# Patient Record
Sex: FEMALE | Race: WHITE | NOT HISPANIC OR LATINO | Employment: OTHER | URBAN - METROPOLITAN AREA
[De-identification: names, ages, dates, MRNs, and addresses within clinical notes are randomized per-mention and may not be internally consistent; named-entity substitution may affect disease eponyms.]

---

## 2023-03-20 LAB
6-ACETYLMORPHINE: <25 NG/ML
7-AMINOCLONAZEPAM: <25 NG/ML
ALPHA-HYDROXYALPRAZOLAM: <25 NG/ML
ALPHA-HYDROXYMIDAZOLAM: <25 NG/ML
ALPRAZOLAM: <25 NG/ML
AMPHETAMINE (PRESENCE) IN URINE BY SCREEN METHOD: ABNORMAL
AMPHETAMINES,URINE: <50 NG/ML
BARBITURATES PRESENCE IN URINE BY SCREEN METHOD: ABNORMAL
CANNABINOIDS IN URINE BY SCREEN METHOD: ABNORMAL
CHLORDIAZEPOXIDE: <25 NG/ML
CLONAZEPAM: <25 NG/ML
COCAINE (PRESENCE) IN URINE BY SCREEN METHOD: ABNORMAL
CODEINE: <50 NG/ML
CREATINE, URINE FOR DRUG: 175.3 MG/DL
DIAZEPAM: <25 NG/ML
DRUG SCREEN COMMENT URINE: ABNORMAL
EDDP: <25 NG/ML
FENTANYL CONFIRMATION, URINE: <2.5 NG/ML
HYDROCODONE: <25 NG/ML
HYDROMORPHONE: <25 NG/ML
LORAZEPAM: <25 NG/ML
MDA,URINE: <200 NG/ML
MDEA,URINE: <200 NG/ML
MDMA,UR: <200 NG/ML
METHADONE CONFIRMATION,URINE: <25 NG/ML
METHAMPHETAMINE QUANTITATIVE URINE: <200 NG/ML
MIDAZOLAM: <25 NG/ML
MORPHINE URINE: <50 NG/ML
NORDIAZEPAM: 50 NG/ML
NORFENTANYL: <2.5 NG/ML
NORHYDROCODONE: <25 NG/ML
NOROXYCODONE: <25 NG/ML
O-DESMETHYLTRAMADOL: <50 NG/ML
OXAZEPAM: 315 NG/ML
OXYCODONE: <25 NG/ML
OXYMORPHONE: <25 NG/ML
PHENCYCLIDINE (PRESENCE) IN URINE BY SCREEN METHOD: ABNORMAL
PHENTERMINE,UR: <200 NG/ML
TEMAZEPAM: 263 NG/ML
TRAMADOL: <50 NG/ML
ZOLPIDEM METABOLITE (ZCA): <25 NG/ML
ZOLPIDEM: <25 NG/ML

## 2023-05-04 LAB
ALANINE AMINOTRANSFERASE (SGPT) (U/L) IN SER/PLAS: 29 U/L (ref 7–45)
ALBUMIN (G/DL) IN SER/PLAS: 4 G/DL (ref 3.4–5)
ALBUMIN (G/DL) IN SER/PLAS: 4.1 G/DL (ref 3.4–5)
ALBUMIN (MG/L) IN URINE: <7 MG/L
ALBUMIN/CREATININE (UG/MG) IN URINE: NORMAL UG/MG CRT (ref 0–30)
ALKALINE PHOSPHATASE (U/L) IN SER/PLAS: 97 U/L (ref 33–136)
ANION GAP IN SER/PLAS: 10 MMOL/L (ref 10–20)
ANION GAP IN SER/PLAS: 10 MMOL/L (ref 10–20)
ASPARTATE AMINOTRANSFERASE (SGOT) (U/L) IN SER/PLAS: 22 U/L (ref 9–39)
BASOPHILS (10*3/UL) IN BLOOD BY AUTOMATED COUNT: 0.05 X10E9/L (ref 0–0.1)
BASOPHILS/100 LEUKOCYTES IN BLOOD BY AUTOMATED COUNT: 0.7 % (ref 0–2)
BILIRUBIN TOTAL (MG/DL) IN SER/PLAS: 0.4 MG/DL (ref 0–1.2)
CALCIUM (MG/DL) IN SER/PLAS: 9.3 MG/DL (ref 8.6–10.3)
CALCIUM (MG/DL) IN SER/PLAS: 9.4 MG/DL (ref 8.6–10.3)
CARBON DIOXIDE, TOTAL (MMOL/L) IN SER/PLAS: 29 MMOL/L (ref 21–32)
CARBON DIOXIDE, TOTAL (MMOL/L) IN SER/PLAS: 30 MMOL/L (ref 21–32)
CHLORIDE (MMOL/L) IN SER/PLAS: 106 MMOL/L (ref 98–107)
CHLORIDE (MMOL/L) IN SER/PLAS: 107 MMOL/L (ref 98–107)
CREATININE (MG/DL) IN SER/PLAS: 1.07 MG/DL (ref 0.5–1.05)
CREATININE (MG/DL) IN SER/PLAS: 1.08 MG/DL (ref 0.5–1.05)
CREATININE (MG/DL) IN URINE: 133 MG/DL (ref 20–320)
EOSINOPHILS (10*3/UL) IN BLOOD BY AUTOMATED COUNT: 0.21 X10E9/L (ref 0–0.7)
EOSINOPHILS/100 LEUKOCYTES IN BLOOD BY AUTOMATED COUNT: 2.7 % (ref 0–6)
ERYTHROCYTE DISTRIBUTION WIDTH (RATIO) BY AUTOMATED COUNT: 13.1 % (ref 11.5–14.5)
ERYTHROCYTE MEAN CORPUSCULAR HEMOGLOBIN CONCENTRATION (G/DL) BY AUTOMATED: 31.9 G/DL (ref 32–36)
ERYTHROCYTE MEAN CORPUSCULAR VOLUME (FL) BY AUTOMATED COUNT: 94 FL (ref 80–100)
ERYTHROCYTES (10*6/UL) IN BLOOD BY AUTOMATED COUNT: 4.15 X10E12/L (ref 4–5.2)
GFR FEMALE: 57 ML/MIN/1.73M2
GFR FEMALE: 57 ML/MIN/1.73M2
GLUCOSE (MG/DL) IN SER/PLAS: 87 MG/DL (ref 74–99)
GLUCOSE (MG/DL) IN SER/PLAS: 88 MG/DL (ref 74–99)
HEMATOCRIT (%) IN BLOOD BY AUTOMATED COUNT: 39.2 % (ref 36–46)
HEMOGLOBIN (G/DL) IN BLOOD: 12.5 G/DL (ref 12–16)
IMMATURE GRANULOCYTES/100 LEUKOCYTES IN BLOOD BY AUTOMATED COUNT: 0.3 % (ref 0–0.9)
LEUKOCYTES (10*3/UL) IN BLOOD BY AUTOMATED COUNT: 7.7 X10E9/L (ref 4.4–11.3)
LYMPHOCYTES (10*3/UL) IN BLOOD BY AUTOMATED COUNT: 1.66 X10E9/L (ref 1.2–4.8)
LYMPHOCYTES/100 LEUKOCYTES IN BLOOD BY AUTOMATED COUNT: 21.6 % (ref 13–44)
MONOCYTES (10*3/UL) IN BLOOD BY AUTOMATED COUNT: 0.59 X10E9/L (ref 0.1–1)
MONOCYTES/100 LEUKOCYTES IN BLOOD BY AUTOMATED COUNT: 7.7 % (ref 2–10)
NEUTROPHILS (10*3/UL) IN BLOOD BY AUTOMATED COUNT: 5.15 X10E9/L (ref 1.2–7.7)
NEUTROPHILS/100 LEUKOCYTES IN BLOOD BY AUTOMATED COUNT: 67 % (ref 40–80)
PHOSPHATE (MG/DL) IN SER/PLAS: 3.6 MG/DL (ref 2.5–4.9)
PLATELETS (10*3/UL) IN BLOOD AUTOMATED COUNT: 298 X10E9/L (ref 150–450)
POTASSIUM (MMOL/L) IN SER/PLAS: 3.9 MMOL/L (ref 3.5–5.3)
POTASSIUM (MMOL/L) IN SER/PLAS: 4 MMOL/L (ref 3.5–5.3)
PROTEIN TOTAL: 6.6 G/DL (ref 6.4–8.2)
SODIUM (MMOL/L) IN SER/PLAS: 142 MMOL/L (ref 136–145)
SODIUM (MMOL/L) IN SER/PLAS: 142 MMOL/L (ref 136–145)
UREA NITROGEN (MG/DL) IN SER/PLAS: 27 MG/DL (ref 6–23)
UREA NITROGEN (MG/DL) IN SER/PLAS: 27 MG/DL (ref 6–23)

## 2023-05-30 ENCOUNTER — OFFICE VISIT (OUTPATIENT)
Dept: PRIMARY CARE | Facility: CLINIC | Age: 68
End: 2023-05-30
Payer: COMMERCIAL

## 2023-05-30 VITALS
OXYGEN SATURATION: 94 % | DIASTOLIC BLOOD PRESSURE: 70 MMHG | TEMPERATURE: 96.5 F | BODY MASS INDEX: 36.72 KG/M2 | WEIGHT: 188 LBS | SYSTOLIC BLOOD PRESSURE: 122 MMHG | HEART RATE: 74 BPM

## 2023-05-30 DIAGNOSIS — M17.0 PRIMARY OSTEOARTHRITIS OF BOTH KNEES: Primary | ICD-10-CM

## 2023-05-30 DIAGNOSIS — M47.812 CERVICAL SPONDYLOSIS WITHOUT MYELOPATHY: ICD-10-CM

## 2023-05-30 DIAGNOSIS — M25.519 CHRONIC SHOULDER PAIN, UNSPECIFIED LATERALITY: ICD-10-CM

## 2023-05-30 DIAGNOSIS — G89.29 CHRONIC SHOULDER PAIN, UNSPECIFIED LATERALITY: ICD-10-CM

## 2023-05-30 PROCEDURE — 1159F MED LIST DOCD IN RCRD: CPT | Performed by: STUDENT IN AN ORGANIZED HEALTH CARE EDUCATION/TRAINING PROGRAM

## 2023-05-30 PROCEDURE — 1160F RVW MEDS BY RX/DR IN RCRD: CPT | Performed by: STUDENT IN AN ORGANIZED HEALTH CARE EDUCATION/TRAINING PROGRAM

## 2023-05-30 PROCEDURE — 99213 OFFICE O/P EST LOW 20 MIN: CPT | Performed by: STUDENT IN AN ORGANIZED HEALTH CARE EDUCATION/TRAINING PROGRAM

## 2023-05-30 RX ORDER — LOSARTAN POTASSIUM 100 MG/1
100 TABLET ORAL
COMMUNITY
Start: 2022-08-20 | End: 2024-03-18 | Stop reason: SDUPTHER

## 2023-05-30 RX ORDER — MODAFINIL 200 MG/1
200 TABLET ORAL 2 TIMES DAILY
Qty: 60 TABLET | Refills: 2 | COMMUNITY
Start: 2023-05-22 | End: 2023-12-21

## 2023-05-30 RX ORDER — BUPROPION HYDROCHLORIDE 300 MG/1
300 TABLET ORAL
COMMUNITY
Start: 2022-08-13

## 2023-05-30 RX ORDER — PREGABALIN 25 MG/1
25 CAPSULE ORAL 2 TIMES DAILY
COMMUNITY
Start: 2015-06-26 | End: 2023-11-30 | Stop reason: ALTCHOICE

## 2023-05-30 RX ORDER — OMEPRAZOLE 40 MG/1
40 CAPSULE, DELAYED RELEASE ORAL
COMMUNITY
Start: 2022-07-27 | End: 2024-02-29 | Stop reason: SDUPTHER

## 2023-05-30 RX ORDER — PREGABALIN 75 MG/1
75 CAPSULE ORAL DAILY
COMMUNITY
Start: 2017-07-12 | End: 2023-11-30 | Stop reason: ALTCHOICE

## 2023-05-30 RX ORDER — DULOXETIN HYDROCHLORIDE 60 MG/1
60 CAPSULE, DELAYED RELEASE ORAL
COMMUNITY
Start: 2022-08-20

## 2023-05-30 RX ORDER — DAPAGLIFLOZIN 10 MG/1
1 TABLET, FILM COATED ORAL DAILY
COMMUNITY
Start: 2020-07-21 | End: 2023-12-21 | Stop reason: SDUPTHER

## 2023-05-30 RX ORDER — TITANIUM DIOXIDE, OCTINOXATE, ZINC OXIDE 4.61; 1.6; .78 G/40ML; G/40ML; G/40ML
1 CREAM TOPICAL
COMMUNITY
End: 2023-10-19 | Stop reason: WASHOUT

## 2023-05-30 RX ORDER — PNV NO.95/FERROUS FUM/FOLIC AC 28MG-0.8MG
100 TABLET ORAL DAILY
COMMUNITY

## 2023-05-30 RX ORDER — FLUTICASONE PROPIONATE 50 MCG
2 SPRAY, SUSPENSION (ML) NASAL
COMMUNITY
Start: 2022-08-28

## 2023-05-30 RX ORDER — CHOLECALCIFEROL (VITAMIN D3) 50 MCG
2000 TABLET ORAL
COMMUNITY

## 2023-05-30 RX ORDER — DIAZEPAM 10 MG/1
10 TABLET ORAL EVERY 12 HOURS PRN
COMMUNITY
Start: 2019-08-23 | End: 2024-02-12 | Stop reason: ALTCHOICE

## 2023-05-30 RX ORDER — HYOSCYAMINE SULFATE 0.38 MG/1
1 TABLET, EXTENDED RELEASE ORAL DAILY
COMMUNITY
Start: 2022-07-14

## 2023-05-30 RX ORDER — HYDROXYCHLOROQUINE SULFATE 200 MG/1
400 TABLET, FILM COATED ORAL
COMMUNITY
Start: 2017-08-10 | End: 2024-02-19 | Stop reason: SDUPTHER

## 2023-05-30 RX ORDER — SPIRONOLACTONE 25 MG/1
1 TABLET ORAL DAILY
COMMUNITY
Start: 2018-08-14 | End: 2024-02-20 | Stop reason: SDUPTHER

## 2023-05-30 RX ORDER — CLOTRIMAZOLE AND BETAMETHASONE DIPROPIONATE 10; .64 MG/G; MG/G
1 CREAM TOPICAL 2 TIMES DAILY
COMMUNITY
Start: 2022-11-07 | End: 2023-10-19 | Stop reason: WASHOUT

## 2023-05-30 RX ORDER — TORSEMIDE 20 MG/1
20 TABLET ORAL DAILY
COMMUNITY
Start: 2022-02-11 | End: 2024-04-08 | Stop reason: SDUPTHER

## 2023-05-30 RX ORDER — ASPIRIN 81 MG/1
81 TABLET ORAL EVERY 12 HOURS
COMMUNITY
Start: 2021-10-28

## 2023-05-30 RX ORDER — LEVOTHYROXINE SODIUM 50 UG/1
50 TABLET ORAL DAILY
COMMUNITY
Start: 2021-04-05 | End: 2024-02-19

## 2023-05-30 RX ORDER — METOPROLOL SUCCINATE 200 MG/1
200 TABLET, EXTENDED RELEASE ORAL DAILY
COMMUNITY
Start: 2021-09-23

## 2023-05-30 RX ORDER — POTASSIUM CHLORIDE 20 MEQ/1
20 TABLET, EXTENDED RELEASE ORAL DAILY
COMMUNITY
Start: 2022-04-23 | End: 2023-10-19 | Stop reason: WASHOUT

## 2023-05-30 ASSESSMENT — ENCOUNTER SYMPTOMS
CONSTIPATION: 0
DYSPHORIC MOOD: 0
NERVOUS/ANXIOUS: 0
HEADACHES: 0
FEVER: 0
HEMATURIA: 0
COUGH: 0
NUMBNESS: 0
DIZZINESS: 0
FATIGUE: 0
SHORTNESS OF BREATH: 0
FREQUENCY: 0
NAUSEA: 0
PALPITATIONS: 0
ABDOMINAL PAIN: 0
CHILLS: 0
WHEEZING: 0
DYSURIA: 0
DIARRHEA: 0

## 2023-05-30 ASSESSMENT — PATIENT HEALTH QUESTIONNAIRE - PHQ9
2. FEELING DOWN, DEPRESSED OR HOPELESS: NOT AT ALL
1. LITTLE INTEREST OR PLEASURE IN DOING THINGS: NOT AT ALL
SUM OF ALL RESPONSES TO PHQ9 QUESTIONS 1 AND 2: 0

## 2023-05-30 NOTE — PROGRESS NOTES
Subjective   Patient ID: Jes Roper is a 68 y.o. female who presents for Knee Pain and Shoulder Pain (Right ).    HPI   Patient here to discuss bilateral knee pain L>R. Dr Smith previously gave her steroid injections and they lasted for a year.   Her R shoulder is also bothering her. She had reverse shoulder replacement about a year and a half ago. She is having a lot of pain and worsening pain with movement. She did great right after surgery and now it feels worse than before the surgery. She does have MS and fibromyalgia. She saw him a few months ago and he thought it was fibro related. It is lateral and superior.   She is not doing her HEP anymore.     Knee pain is medial. Has tricompartmental OA worse in medial compartments. She has done PT for her knees. Dr Smith gave her injections previously.     Review of Systems   Constitutional:  Negative for chills, fatigue and fever.   Respiratory:  Negative for cough, shortness of breath and wheezing.    Cardiovascular:  Negative for chest pain, palpitations and leg swelling.   Gastrointestinal:  Negative for abdominal pain, constipation, diarrhea and nausea.   Genitourinary:  Negative for dysuria, frequency, hematuria and urgency.   Neurological:  Negative for dizziness, numbness and headaches.   Psychiatric/Behavioral:  Negative for dysphoric mood. The patient is not nervous/anxious.        Objective   /70   Pulse 74   Temp 35.8 °C (96.5 °F)   Wt 85.3 kg (188 lb)   SpO2 94%   BMI 36.72 kg/m²     Physical Exam  Constitutional:       Appearance: Normal appearance.   HENT:      Head: Normocephalic and atraumatic.   Eyes:      Extraocular Movements: Extraocular movements intact.      Pupils: Pupils are equal, round, and reactive to light.   Cardiovascular:      Rate and Rhythm: Normal rate and regular rhythm.      Heart sounds: Normal heart sounds. No murmur heard.  Pulmonary:      Effort: Pulmonary effort is normal.      Breath sounds: Normal breath  sounds. No wheezing.   Abdominal:      General: Bowel sounds are normal.      Palpations: Abdomen is soft.      Tenderness: There is no abdominal tenderness. There is no guarding.   Musculoskeletal:         General: Normal range of motion.   Skin:     General: Skin is warm and dry.   Neurological:      General: No focal deficit present.      Mental Status: She is alert and oriented to person, place, and time.   Psychiatric:         Mood and Affect: Mood normal.         Behavior: Behavior normal.         Assessment/Plan   Problem List Items Addressed This Visit       Cervical spondylosis without myelopathy    Primary osteoarthritis of both knees - Primary    Shoulder pain       Will have her get back in with Dr Smith for further evaluation       Patient understands and agrees with treatment plan    Dori Umanzor DO   06/03/23

## 2023-10-17 ENCOUNTER — TELEPHONE (OUTPATIENT)
Dept: CARDIOLOGY | Facility: HOSPITAL | Age: 68
End: 2023-10-17
Payer: COMMERCIAL

## 2023-10-19 ENCOUNTER — HOSPITAL ENCOUNTER (OUTPATIENT)
Dept: RADIOLOGY | Facility: HOSPITAL | Age: 68
Discharge: HOME | End: 2023-10-19
Payer: COMMERCIAL

## 2023-10-19 ENCOUNTER — CONSULT (OUTPATIENT)
Dept: PHYSICAL MEDICINE AND REHAB | Facility: CLINIC | Age: 68
End: 2023-10-19
Payer: COMMERCIAL

## 2023-10-19 VITALS
HEIGHT: 59 IN | BODY MASS INDEX: 37.29 KG/M2 | HEART RATE: 67 BPM | RESPIRATION RATE: 18 BRPM | SYSTOLIC BLOOD PRESSURE: 116 MMHG | WEIGHT: 185 LBS | TEMPERATURE: 97.2 F | DIASTOLIC BLOOD PRESSURE: 74 MMHG

## 2023-10-19 DIAGNOSIS — G89.29 CHRONIC PAIN OF BOTH SHOULDERS: ICD-10-CM

## 2023-10-19 DIAGNOSIS — M25.512 CHRONIC PAIN OF BOTH SHOULDERS: ICD-10-CM

## 2023-10-19 DIAGNOSIS — M54.2 NECK PAIN, CHRONIC: ICD-10-CM

## 2023-10-19 DIAGNOSIS — M17.10 ARTHRITIS OF KNEE: ICD-10-CM

## 2023-10-19 DIAGNOSIS — M25.511 CHRONIC PAIN OF BOTH SHOULDERS: ICD-10-CM

## 2023-10-19 DIAGNOSIS — G35 MULTIPLE SCLEROSIS (MULTI): ICD-10-CM

## 2023-10-19 DIAGNOSIS — M17.10 ARTHRITIS OF KNEE: Primary | ICD-10-CM

## 2023-10-19 DIAGNOSIS — G89.29 NECK PAIN, CHRONIC: ICD-10-CM

## 2023-10-19 PROCEDURE — 73030 X-RAY EXAM OF SHOULDER: CPT | Mod: RT

## 2023-10-19 PROCEDURE — 73564 X-RAY EXAM KNEE 4 OR MORE: CPT | Mod: RT

## 2023-10-19 PROCEDURE — 73590 X-RAY EXAM OF LOWER LEG: CPT | Mod: RIGHT SIDE | Performed by: RADIOLOGY

## 2023-10-19 PROCEDURE — 72040 X-RAY EXAM NECK SPINE 2-3 VW: CPT | Mod: FY

## 2023-10-19 PROCEDURE — 99204 OFFICE O/P NEW MOD 45 MIN: CPT | Performed by: PHYSICAL MEDICINE & REHABILITATION

## 2023-10-19 PROCEDURE — 73030 X-RAY EXAM OF SHOULDER: CPT | Mod: LEFT SIDE | Performed by: RADIOLOGY

## 2023-10-19 PROCEDURE — 73564 X-RAY EXAM KNEE 4 OR MORE: CPT | Mod: RIGHT SIDE | Performed by: RADIOLOGY

## 2023-10-19 PROCEDURE — 72050 X-RAY EXAM NECK SPINE 4/5VWS: CPT | Performed by: RADIOLOGY

## 2023-10-19 PROCEDURE — 73590 X-RAY EXAM OF LOWER LEG: CPT | Mod: RT,FY

## 2023-10-19 PROCEDURE — 73030 X-RAY EXAM OF SHOULDER: CPT | Mod: LT,FY

## 2023-10-19 PROCEDURE — 73030 X-RAY EXAM OF SHOULDER: CPT | Mod: RIGHT SIDE | Performed by: RADIOLOGY

## 2023-10-19 RX ORDER — DIROXIMEL FUMARATE 231 MG/1
231 CAPSULE ORAL 2 TIMES DAILY
COMMUNITY

## 2023-10-19 ASSESSMENT — PAIN SCALES - GENERAL: PAINLEVEL: 5

## 2023-10-19 NOTE — PROGRESS NOTES
Ref By Dr Gurrola for knee pain and has MS and fibromyalgia.  Has shoulder pain.  Fell on Tuesday has injured her right knee and bruised right shoulder.  Not sure if she hit her head.

## 2023-10-19 NOTE — PROGRESS NOTES
Physical Medicine and Rehabilitation [facility] Consultation   10/19/23    Chief Complaint:  Neck Pain     HPI:  Jes Roper is a 68 y.o. old w pmh of Ms follows w neurologist, fibromyalgia ( on plaquenil w some improvement, had elevated crp), renal insufficiency,  female who presents to the clinic today at the request of Dr. Smith for evaluation of neck pain and upper shoulder pain.    She also has bl knee oa, needs replacement, but injections stopped working. Last was in July 2023 wo relief. She has had series of injections in the past that were helpful.  She uses tens, ice and heat. Tried otc braces wo relief. Was on mobic,  but now has renal insufficiency. Voltaren gel w mild relief. Since recent has been taking 1500 mg tid.  Lidocaine patches short term.    She is on lyrica for general pain,.  She has a ho of right reverse total shoulder arthroplasty and biceps tenodesis 10/2021 w improvement in ROM of shoulder and pain.      Of note she had has a history of MS. For mobility she does not use any assistive devices, states now a little off balance. Two days ago tripped over a step next to bed.  At store uses a scooter due to knees,ms and sob.    Re neck pain  Onset:   Location:over 20 years, getting worse  Radiation: upper shoulders, occasionally down to fingers  Quality: sharp, dull achy and burning  Duration: comes and goes  Aggravating factors:  looking down stress  Alleviating factors: meds lyrica cymbalta bengay, tens  Severity: 4  Numbness/Tingling: No rarely  Bowel or bladder incontinence: Yes bladder due to MS urge  Current medication regimen:as above     Treatment to date:  Physical therapy: No   Medications taken to date for this complaint include the following:   As above  Prior injections: No          Imaging  Reviewed 10/19/23    Xr knees 7/2023     FINDINGS:  Bilateral knees, four views of each     There is severe medial and moderate to severe lateral and  patellofemoral compartment joint space  narrowing osteophytosis and  sclerosis. There is genu varum deformity bilaterally. There is a  moderate-sized effusion bilaterally as well. Several intra-articular  bodies present     IMPRESSION:  Severe bilateral knee osteoarthritis worse in the medial compartment.  Intra-articular bodies present. There is bilateral genu varum.     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    Abnormal serum cholesterol    Disorder of pigmentation, unspecified 03/22/2022    Discoloration of skin of toe    Dizziness and giddiness 07/01/2022    Dizziness    Dorsalgia, unspecified 12/30/2022    Back pain    Edema, unspecified 07/17/2019    Edema    Encounter for fitting and adjustment of other specified devices 01/19/2021    Fitting and adjustment of pessary    Encounter for general adult medical examination without abnormal findings 03/22/2022    Encounter for Medicare annual wellness exam    Encounter for gynecological examination (general) (routine) without abnormal findings 12/05/2018    Visit for gynecologic examination    Encounter for gynecological examination (general) (routine) without abnormal findings 03/17/2016    Encounter for gynecological examination without abnormal finding    Encounter for other screening for malignant neoplasm of breast 11/01/2019    Encounter for screening for malignant neoplasm of breast    Encounter for other screening for malignant neoplasm of breast 03/22/2022    Breast screening    Fibromyalgia 09/29/2022    Fibromyalgia    Hypothyroidism, unspecified 03/22/2022     Hypothyroidism    Local infection of the skin and subcutaneous tissue, unspecified 2019    Toe infection    Multiple sclerosis (CMS/[facility]) 2022    Multiple sclerosis    Obesity, unspecified 2016    Mild obesity    Other age-related incipient cataract, bilateral 2022    Other age-related incipient cataract of both eyes    Other chronic pain 2022    Chronic pain    Other specified disorders of urethra 2020    Prolapse of urethra    Pain in right knee 2022    Right knee pain    Peripheral vascular disease, unspecified (CMS/[facility]) 2022    Arterial insufficiency of lower extremity    Personal history of other diseases of urinary system 2020    History of prolapse of bladder    Personal history of other endocrine, nutritional and metabolic disease 2014    History of hypothyroidism    Personal history of other specified conditions 2022    History of chronic cough    Sciatica, right side 2019    Right sciatic nerve pain    Sleep apnea, unspecified 04/10/2017    Sleep apnea    Torticollis 2022    Torticollis    Tremor, unspecified 2022    Tremor of both hands    Unspecified cataract     Cataracts, both eyes    Unspecified skin changes 2022    Skin change    Unspecified symptoms and signs involving the genitourinary system 2021    Urinary symptom or sign        Past Surgical History:   Procedure Laterality Date     SECTION, CLASSIC  10/03/2018     Section    GALLBLADDER SURGERY  2020    Gallbladder Surgery    OTHER SURGICAL HISTORY  2021    Shoulder replacement    OTHER SURGICAL HISTORY  2020    Exploratory laparotomy    OTHER SURGICAL HISTORY  2020    Urethroplasty        Patient Active Problem List    Diagnosis Date Noted    Primary osteoarthritis of both knees 2023    Shoulder pain 2023    Cervical spondylosis without myelopathy 2018        No family history on file.      Current Outpatient Medications   Medication Sig Dispense Refill    aspirin 81 mg EC tablet Take 1 tablet (81 mg) by mouth every 12 hours.      buPROPion XL (Wellbutrin XL) 300 mg 24 hr tablet Take 1 tablet (300 mg) by mouth once daily.      cholecalciferol (Vitamin D-3) 50 MCG (2000 UT) tablet Take 1 tablet (2,000 Units) by mouth once daily.      clotrimazole-betamethasone (Lotrisone) cream Apply 1 Application topically 2 times a day.      cranberry 400 mg capsule Take 1 capsule by mouth once daily.      cyanocobalamin (Vitamin B-12) 100 mcg tablet Take 1 tablet (100 mcg) by mouth once daily.      dapagliflozin (Farxiga) 10 mg Take 1 tablet (10 mg) by mouth once daily.      diazePAM (Valium) 10 mg tablet Take 1 tablet (10 mg) by mouth twice a day.      DIROXIMEL FUMARATE ORAL Take 231 mg by mouth twice a day.      DULoxetine (Cymbalta) 60 mg DR capsule Take 1 capsule (60 mg) by mouth once daily.      fluticasone (Flonase) 50 mcg/actuation nasal spray Administer 2 sprays into each nostril once daily.      hydroxychloroquine (Plaquenil) 200 mg tablet Take 2 tablets (400 mg) by mouth once daily.      hyoscyamine ER (Levbid) 0.375 mg 12 hr tablet Take 1 tablet (0.375 mg) by mouth once daily.      levothyroxine (Synthroid, Levoxyl) 50 mcg tablet Take 1 tablet (50 mcg) by mouth once daily.      losartan (Cozaar) 100 mg tablet Take 1 tablet (100 mg) by mouth once daily.      metoprolol succinate XL (Toprol-XL) 200 mg 24 hr tablet Take 1 tablet (200 mg) by mouth once daily.      modafinil (Provigil) 200 mg tablet Take 1 tablet (200 mg) by mouth twice a day. 60 tablet 2    multivit-mineral-iron-lutein tablet Take 1 tablet by mouth once daily.      omeprazole (PriLOSEC) 40 mg DR capsule Take 1 capsule (40 mg) by mouth once daily in the morning. Take before meals.      potassium chloride CR (Klor-Con M20) 20 mEq ER tablet Take 1 tablet (20 mEq) by mouth once daily.      pregabalin (Lyrica) 25 mg capsule Take 1 capsule (25  "mg) by mouth twice a day.      pregabalin (Lyrica) 75 mg capsule Take 1 capsule (75 mg) by mouth once daily.      spironolactone (Aldactone) 25 mg tablet Take 1 tablet (25 mg) by mouth once daily.      torsemide (Demadex) 20 mg tablet Take 1 tablet (20 mg) by mouth once daily.       No current facility-administered medications for this visit.        Allergies   Allergen Reactions    Narcan [Naloxone] Unknown    Penicillin Unknown        Social History     Socioeconomic History    Marital status:      Spouse name: None    Number of children: None    Years of education: None    Highest education level: None   Occupational History    None   Tobacco Use    Smoking status: Never    Smokeless tobacco: Never   Substance and Sexual Activity    Alcohol use: Never    Drug use: Never    Sexual activity: None   Other Topics Concern    None   Social History Narrative    None     Social Determinants of Health     Financial Resource Strain: Not on file   Food Insecurity: Not on file   Transportation Needs: Not on file   Physical Activity: Not on file   Stress: Not on file   Social Connections: Not on file   Intimate Partner Violence: Not on file   Housing Stability: Not on file        Review of Systems  Constitutional:  Denies fever or chills   Eyes:  Denies change in visual acuity   HENT:  Denies nasal congestion or sore throat   Respiratory:  Denies cough or shortness of breath   Cardiovascular:  Denies chest pain or edema   GI:  Denies abdominal pain, nausea, vomiting, bloody stools or diarrhea   :  Denies dysuria   Integument:  Denies rash   Neurologic: as per HPI  MSK: Per above HPI  Endocrine:  Denies polyuria or polydipsia   Lymphatic:  Denies swollen glands   Psychiatric:  Denies depression or anxiety         Physical Exam:  /74 (BP Location: Right arm, Patient Position: Sitting)   Pulse 67   Temp 36.2 °C (97.2 °F)   Resp 18   Ht 1.499 m (4' 11\")   Wt 83.9 kg (185 lb)   BMI 37.37 kg/m²     General:  " NAD, F    Psychiatric: appropriate mood & affect.   Cardiovascular:  Normal radial pulses; no UE  edema.  Respiratory:  Normal rate; unlabored breathing.  Skin:  Intact; no erythema; no ecchymosis or rash.  Lymphatic:  No lymphadenopathy or lymphedema.  NEURO:  Alert and appropriate.  Speech fluent, conversing appropriately.   Motor:    Rt: SA 5/5, SER 5/5, EE 5/5, EF 5/5, WE 5/5, FDIM 5/5, ADM 5/5    Lt: SA 5/5, SER 5/5, EE 5/5, EF 5/5, WE 5/5, FDIM 5/5, ADM 5/5    Rt: HF 4/5    Lt: HF 4/5  Sensation:     Light touch intact in the b/l C5-T2 dermatomes.     PP: intact in the b/l C5-T2 dermatomes.  Reflexes:     Right: Biceps 2+, brachioradialis 2+, triceps 2+, patellar 2+, achilles 2+    Left: Biceps 2+, brachioradialis 2+, triceps 2+, patellar 2+, achilles 2+     Babinski's downgoing; no clonus     Gait: not able to walk due to R leg pain, can't bear weight   MSK:  Inspection of the neck reveals no soft tissue swelling or ecchymoses.   Pain w cervical rom  Shoulder rom passive intact but pain  Bruising left chest  There is tenderness over the C spine paraspinals  R knee patella, throughout R lower leg, no redness/swelling RLE          Impression:  Jes Roper is a 68 y.o. f w pmh of MS, renal insufficiency, fibromyalgia, HTN, CHF presenting with severe knee oa, last injections w no relief as well as neck pain likely due to spondylosis. She had a recent fall and also has worsening R knee pain, lower leg pain as well as b/l shoulder pain, left more than right.       Plan:  Orders Placed This Encounter   Procedures    XR knee right 4+ views    XR tibia fibula right 2 views    XR cervical spine 2-3 views    XR shoulder right 2+ views    XR shoulder left 2+ views    Referral to Pain Medicine    Referral to Physical Therapy     She had a recent fall and did not go to Ed. Fell on her R knee and shoulders.   XR bl shoulders, R knee., R tib/fib. Ho of R shoulder replacement. Could not bear weight RLE. Toe touch.  Xr  C spine  PT for rom, gait, strengthening, water therapy  Referral to pain medicine to eval for bl genicular nerve blocks if steroid injections are only short term relief again and if not surgical candidate  Already on meds as per rheum; lyrica, Cymbalta. Has renal insufficieny should not take nsaids.  No more than 3-4,000 mg of tylenol/day. Currently taking 4500  7. Bl don nancy knee reaction braces     Fu 4-6 weeks  Thank you for the consultation.     Lisa Marie MD      Physical Medicine and Rehabilitation

## 2023-11-06 PROBLEM — G47.33 OBSTRUCTIVE SLEEP APNEA SYNDROME: Status: ACTIVE | Noted: 2019-12-12

## 2023-11-06 PROBLEM — M50.20 DISPLACEMENT OF CERVICAL INTERVERTEBRAL DISC WITHOUT MYELOPATHY: Status: ACTIVE | Noted: 2018-01-30

## 2023-11-06 PROBLEM — E78.2 COMBINED HYPERLIPIDEMIA ASSOCIATED WITH TYPE 2 DIABETES MELLITUS (MULTI): Status: ACTIVE | Noted: 2023-11-06

## 2023-11-06 PROBLEM — R60.9 EDEMA: Status: ACTIVE | Noted: 2023-11-06

## 2023-11-06 PROBLEM — R39.9 ABNORMAL FINDING OF KIDNEY: Status: ACTIVE | Noted: 2023-11-06

## 2023-11-06 PROBLEM — N28.9 ABNORMAL RENAL FUNCTION: Status: ACTIVE | Noted: 2023-11-06

## 2023-11-06 PROBLEM — M15.9 GENERALIZED OSTEOARTHRITIS OF MULTIPLE SITES: Status: ACTIVE | Noted: 2023-11-06

## 2023-11-06 PROBLEM — N18.31 STAGE 3A CHRONIC KIDNEY DISEASE (MULTI): Status: ACTIVE | Noted: 2023-11-06

## 2023-11-06 PROBLEM — R31.21 ASYMPTOMATIC MICROSCOPIC HEMATURIA: Status: ACTIVE | Noted: 2023-11-06

## 2023-11-06 PROBLEM — F43.20 ADULT SITUATIONAL STRESS DISORDER: Status: ACTIVE | Noted: 2023-11-06

## 2023-11-06 PROBLEM — R91.1 PULMONARY NODULE: Status: ACTIVE | Noted: 2023-11-06

## 2023-11-06 PROBLEM — G89.29 CHRONIC PAIN: Status: ACTIVE | Noted: 2018-02-26

## 2023-11-06 PROBLEM — I15.2 HYPERTENSION ASSOCIATED WITH DIABETES (MULTI): Status: ACTIVE | Noted: 2023-11-06

## 2023-11-06 PROBLEM — G47.19 EXCESSIVE DAYTIME SLEEPINESS: Status: ACTIVE | Noted: 2023-11-06

## 2023-11-06 PROBLEM — M99.01 CERVICAL SEGMENT DYSFUNCTION: Status: ACTIVE | Noted: 2018-02-27

## 2023-11-06 PROBLEM — E11.69 COMBINED HYPERLIPIDEMIA ASSOCIATED WITH TYPE 2 DIABETES MELLITUS (MULTI): Status: ACTIVE | Noted: 2023-11-06

## 2023-11-06 PROBLEM — N39.3 FEMALE STRESS INCONTINENCE: Status: ACTIVE | Noted: 2023-11-06

## 2023-11-06 PROBLEM — R31.0 GROSS HEMATURIA: Status: ACTIVE | Noted: 2023-11-06

## 2023-11-06 PROBLEM — M43.16 SPONDYLOLISTHESIS OF LUMBAR REGION: Status: ACTIVE | Noted: 2018-02-26

## 2023-11-06 PROBLEM — D64.9 ANEMIA: Status: ACTIVE | Noted: 2023-11-06

## 2023-11-06 PROBLEM — E78.9 ABNORMAL SERUM CHOLESTEROL: Status: ACTIVE | Noted: 2023-11-06

## 2023-11-06 PROBLEM — M75.100 ROTATOR CUFF TEAR: Status: ACTIVE | Noted: 2023-11-06

## 2023-11-06 PROBLEM — M65.4 DE QUERVAIN'S TENOSYNOVITIS: Status: ACTIVE | Noted: 2023-11-06

## 2023-11-06 PROBLEM — F33.41 RECURRENT MAJOR DEPRESSION IN PARTIAL REMISSION (CMS-HCC): Status: ACTIVE | Noted: 2019-03-06

## 2023-11-06 PROBLEM — R26.9 GAIT ABNORMALITY: Status: ACTIVE | Noted: 2023-11-06

## 2023-11-06 PROBLEM — I50.22 CHRONIC SYSTOLIC HEART FAILURE (MULTI): Status: ACTIVE | Noted: 2023-11-06

## 2023-11-06 PROBLEM — E03.9 HYPOTHYROIDISM: Status: ACTIVE | Noted: 2023-11-06

## 2023-11-06 PROBLEM — F98.8 ATTENTION DEFICIT DISORDER WITHOUT HYPERACTIVITY: Status: ACTIVE | Noted: 2023-11-06

## 2023-11-06 PROBLEM — M99.02 THORACIC SEGMENT DYSFUNCTION: Status: ACTIVE | Noted: 2018-02-27

## 2023-11-06 PROBLEM — H52.209 ASTIGMATISM: Status: ACTIVE | Noted: 2023-11-06

## 2023-11-06 PROBLEM — R27.8 ABNORMAL COORDINATION: Status: ACTIVE | Noted: 2023-11-06

## 2023-11-06 PROBLEM — R53.1 DECREASED STRENGTH: Status: ACTIVE | Noted: 2023-11-06

## 2023-11-06 PROBLEM — K21.9 GERD (GASTROESOPHAGEAL REFLUX DISEASE): Status: ACTIVE | Noted: 2023-11-06

## 2023-11-06 PROBLEM — H25.812 COMBINED FORM OF AGE-RELATED CATARACT, LEFT EYE: Status: ACTIVE | Noted: 2023-11-06

## 2023-11-06 PROBLEM — R93.89 ABNORMAL CHEST CT: Status: ACTIVE | Noted: 2023-11-06

## 2023-11-06 PROBLEM — E11.59 HYPERTENSION ASSOCIATED WITH DIABETES (MULTI): Status: ACTIVE | Noted: 2023-11-06

## 2023-11-06 PROBLEM — I73.9 ARTERIAL INSUFFICIENCY OF LOWER EXTREMITY (CMS-HCC): Status: ACTIVE | Noted: 2023-11-06

## 2023-11-06 PROBLEM — R06.09 DYSPNEA ON EXERTION: Status: ACTIVE | Noted: 2023-11-06

## 2023-11-06 RX ORDER — FUROSEMIDE 20 MG/1
20 TABLET ORAL
COMMUNITY
End: 2023-11-30 | Stop reason: WASHOUT

## 2023-11-06 RX ORDER — THYROID 30 MG/1
30 TABLET ORAL
COMMUNITY
End: 2023-11-30 | Stop reason: WASHOUT

## 2023-11-06 RX ORDER — TIZANIDINE 2 MG/1
2 TABLET ORAL
COMMUNITY
Start: 2023-09-03 | End: 2024-01-15

## 2023-11-06 RX ORDER — MULTIVIT,IRON,MINERALS/LUTEIN
1 TABLET ORAL DAILY
COMMUNITY
Start: 2017-09-08

## 2023-11-06 RX ORDER — POTASSIUM CHLORIDE 1.5 G/1.58G
20 POWDER, FOR SOLUTION ORAL
COMMUNITY
End: 2024-06-04 | Stop reason: SDUPTHER

## 2023-11-07 ENCOUNTER — EVALUATION (OUTPATIENT)
Dept: PHYSICAL THERAPY | Facility: HOSPITAL | Age: 68
End: 2023-11-07
Payer: COMMERCIAL

## 2023-11-07 DIAGNOSIS — G89.29 CHRONIC PAIN OF BOTH SHOULDERS: ICD-10-CM

## 2023-11-07 DIAGNOSIS — M25.511 CHRONIC PAIN OF BOTH SHOULDERS: ICD-10-CM

## 2023-11-07 DIAGNOSIS — M17.10 ARTHRITIS OF KNEE: ICD-10-CM

## 2023-11-07 DIAGNOSIS — M25.512 CHRONIC PAIN OF BOTH SHOULDERS: ICD-10-CM

## 2023-11-07 DIAGNOSIS — G35 MULTIPLE SCLEROSIS (MULTI): ICD-10-CM

## 2023-11-07 DIAGNOSIS — M17.0 PRIMARY OSTEOARTHRITIS OF BOTH KNEES: Primary | ICD-10-CM

## 2023-11-07 PROCEDURE — 97161 PT EVAL LOW COMPLEX 20 MIN: CPT | Mod: GP | Performed by: PHYSICAL THERAPIST

## 2023-11-07 PROCEDURE — 97110 THERAPEUTIC EXERCISES: CPT | Mod: GP | Performed by: PHYSICAL THERAPIST

## 2023-11-07 ASSESSMENT — PATIENT HEALTH QUESTIONNAIRE - PHQ9
SUM OF ALL RESPONSES TO PHQ9 QUESTIONS 1 AND 2: 0
2. FEELING DOWN, DEPRESSED OR HOPELESS: NOT AT ALL
1. LITTLE INTEREST OR PLEASURE IN DOING THINGS: NOT AT ALL

## 2023-11-07 ASSESSMENT — ENCOUNTER SYMPTOMS
OCCASIONAL FEELINGS OF UNSTEADINESS: 1
DEPRESSION: 0
LOSS OF SENSATION IN FEET: 0

## 2023-11-07 ASSESSMENT — PAIN - FUNCTIONAL ASSESSMENT: PAIN_FUNCTIONAL_ASSESSMENT: 0-10

## 2023-11-07 ASSESSMENT — PAIN SCALES - GENERAL: PAINLEVEL_OUTOF10: 6

## 2023-11-07 NOTE — PROGRESS NOTES
Physical Therapy    Physical Therapy Evaluation    Patient Name: Jes Roper  MRN: [id number]  Today's Date: 11/10/2023  Time Calculation  Start Time: 1530  Stop Time: 1630  Time Calculation (min): 60 min  Visit # 1  Assessment Pt presents with pain, decreased strength, decreased endurance and would benefit from skilled PT services.        Plan Request 2x per week x 4-6 weeks  Treatment/Interventions: Cryotherapy, Education/ Instruction, Electrical stimulation, Gait training, Hot pack, Manual therapy, Therapeutic activities, Therapeutic exercises, Aquatic therapy    Current Problem  1. Primary osteoarthritis of both knees        2. Arthritis of knee  Referral to Physical Therapy    Follow Up In Physical Therapy      3. Chronic pain of both shoulders  Referral to Physical Therapy      4. Multiple sclerosis (CMS/[facility])  Referral to Physical Therapy          Subjective  Pt was diagnosed with MS in February of 2002.  She has recently changed her MS meds, and feels like her shaking and other MS symptoms are worse. She wonders if new meds caused her fall, or did fall exacerbate her symptoms. October 17th pt fell , struck left shoulder on dresser and fell onto right side/hip.   was able to help her up. But soon realized weight bearing on the right side was painful. She tried ice, heat and TENS. She did see pain management 2 days later ( a previously scheduled) She then had a c spine x ray (she had been seeing pain mangament for spinal stenosis anyway) she had x ray of left shoulder, right leg, knees. These were all negative. She started using 4 wheel walker after her fall. Prior to fall was not using any assistive device, but was using furniture to walk with.  She has history of right total shoulder replacement in October of 2021. She has been given knee braces, and she feels they help but she does not where unless she has a far distance to walk. She has admitted that she has a hoarding sitution, and she would  like to get well enough to deal with cleaning out her house. She does need help occasionally with lower body dressing, and hard to step in and out of shower,  a shower  tub bench but doesn't use.   Precautions:  Precautions  STEADI Fall Risk Score (The score of 4 or more indicates an increased risk of falling): 10  Pain: Feels like sciatic pain on right buttock, 3/10, but can go up to 10/10. Knee pain in sitting is 1/10. When walking, pain increases and down into shins, pain on right side lower leg.   Pain Assessment: 0-10  Pain Score: 6  Pain Location: Knee  Home Living: Lives with . First floor level set up, does have a basement. 2 steps to enter home.  She is a .       Prior Function Per Pt/Caregiver Report: She is on disability       Objective      Range of Motion: LE WFL     Strength: Quads and Hip abd 4/5, hams 4+/5, DF 4+/5      Palpation: tender medial and lateral joint line bilateral knees      Gait: Front wheeled walker.       Stairs: one at a time,      Bed Mobility: she has a bed rail that she uses for mobility.      Transfers: sit to stand is indepenedent      Outcome Measures:  Other Measures  Lower Extremity Funtional Score (LEFS): 11     OP EDUCATION: Advised pt to be up moving/walking/ standing 10 mins of every waking hour. Reviewed LAQ, HR/TR, benjamin alberto     Goals:  Active       PT Problem       PT Goals       Start:  11/07/23    Expected End:  12/26/23       In 4-6 weeks pt will demonstrate:    1) Decreased pain in bilateral knees to 1-2/10 max to allow greater ease with ADL, household chores  2) increased standing and walking tolerance to 45 mins to allow increased mobility in the community and greater ease with household chores.  3) Increased strength to 5/5 bilateral LE to allow greater ease with household tasks, ambulation, and ADL's

## 2023-11-07 NOTE — Clinical Note
November 9, 2023     Patient: Jes Roper   YOB: 1955   Date of Visit: 11/7/2023       To Whom it May Concern:    Jes Roper was seen in my clinic on 11/7/2023. She {Return to school/sport:88842}.    If you have any questions or concerns, please don't hesitate to call.         Sincerely,          Osiris Quick, PT        CC: No Recipients

## 2023-11-14 ENCOUNTER — TREATMENT (OUTPATIENT)
Dept: PHYSICAL THERAPY | Facility: HOSPITAL | Age: 68
End: 2023-11-14
Payer: COMMERCIAL

## 2023-11-14 DIAGNOSIS — M17.10 ARTHRITIS OF KNEE: ICD-10-CM

## 2023-11-14 PROCEDURE — 97110 THERAPEUTIC EXERCISES: CPT | Mod: GP | Performed by: PHYSICAL THERAPIST

## 2023-11-14 ASSESSMENT — PAIN - FUNCTIONAL ASSESSMENT: PAIN_FUNCTIONAL_ASSESSMENT: 0-10

## 2023-11-14 ASSESSMENT — PAIN SCALES - GENERAL: PAINLEVEL_OUTOF10: 7

## 2023-11-14 NOTE — PROGRESS NOTES
Physical Therapy    Physical Therapy Treatment    Patient Name: Jes Roper  MRN: [id number]  Today's Date: 11/14/2023     Visit #2    Assessment: pt needed occasional seated rest breaks, reported decreased pain in knees after therapy but increased fatigue       Plan: continue with gait, balance and LE strength, Try 6 min walk test next session       Current Problem  1. Arthritis of knee  Follow Up In Physical Therapy          Subjective  reports pain levels in both knees at 7/10, anterior knees, and posterior right knee.        Precautions  Precautions  STEADI Fall Risk Score (The score of 4 or more indicates an increased risk of falling): 10       Pain  Pain Assessment: 0-10  Pain Score: 7  Pain Location: Knee    Objective   Needs contact guard  in parallel bars during balance exericises          Treatments:  EXERCISES Date 11/14/2023   Date  Date Date   VISIT# # 2 # # #    REPS REPS REPS REPS   Nu step 10 L 1      Parallel Bars      Rockerboard gastroc stretch                            Foam balance, marches, weight shift A/P and med/lat,                            Standing Side kicks                            Slow High knee marches   20 Sec H x 5     3 mins    Next   Next       Seated Marches              LAQ              Adduction Ball squeeze 20x  20x leah  5 sec hold x 15                    6 min walk test NEXT                                                                                                                                                                              HEP          OP EDUCATION: Reviewed HEP     Goals:  Active       PT Problem       PT Goals       Start:  11/07/23    Expected End:  12/26/23       In 4-6 weeks pt will demonstrate:    1) Decreased pain in bilateral knees to 1-2/10 max to allow greater ease with ADL, household chores  2) increased standing and walking tolerance to 45 mins to allow increased mobility in the community and greater ease with household chores.  3)  Increased strength to 5/5 bilateral LE to allow greater ease with household tasks, ambulation, and ADL's

## 2023-11-16 ENCOUNTER — TREATMENT (OUTPATIENT)
Dept: PHYSICAL THERAPY | Facility: HOSPITAL | Age: 68
End: 2023-11-16
Payer: COMMERCIAL

## 2023-11-16 DIAGNOSIS — G35 MULTIPLE SCLEROSIS (MULTI): Primary | ICD-10-CM

## 2023-11-16 DIAGNOSIS — M17.0 PRIMARY OSTEOARTHRITIS OF BOTH KNEES: ICD-10-CM

## 2023-11-16 DIAGNOSIS — M25.512 CHRONIC PAIN OF BOTH SHOULDERS: ICD-10-CM

## 2023-11-16 DIAGNOSIS — G89.29 CHRONIC PAIN OF BOTH SHOULDERS: ICD-10-CM

## 2023-11-16 DIAGNOSIS — M25.511 CHRONIC PAIN OF BOTH SHOULDERS: ICD-10-CM

## 2023-11-16 DIAGNOSIS — M17.10 ARTHRITIS OF KNEE: ICD-10-CM

## 2023-11-16 PROCEDURE — 97110 THERAPEUTIC EXERCISES: CPT | Mod: GP,CQ

## 2023-11-16 ASSESSMENT — PAIN SCALES - GENERAL: PAINLEVEL_OUTOF10: 7

## 2023-11-16 ASSESSMENT — PAIN - FUNCTIONAL ASSESSMENT: PAIN_FUNCTIONAL_ASSESSMENT: 0-10

## 2023-11-16 NOTE — PROGRESS NOTES
"Physical Therapy    Physical Therapy Treatment    Patient Name: Jes Roper  MRN: [id number]  Today's Date: 11/16/2023  Time Calculation  Start Time: 0200  Stop Time: 0245  Time Calculation (min): 45 min  Visit #2    Assessment: Reduced time on NuStep to complete 6 min walk. Patient reported leah. knee pain reduced to 4/10 at end of visit.       Plan: continue with gait, balance and LE strength       Current Problem  1. Multiple sclerosis (CMS/HCC)        2. Arthritis of knee  Follow Up In Physical Therapy      3. Primary osteoarthritis of both knees        4. Chronic pain of both shoulders            Subjective  reports pain levels in both knees at 7/10, anterior knees, and posterior right knee.        Precautions  Precautions  STEADI Fall Risk Score (The score of 4 or more indicates an increased risk of falling): 10       Pain  Pain Assessment: 0-10  Pain Score: 7  Pain Location: Knee    Objective  SBA with // bar balance  655 ft 6 min wal          Treatments:  EXERCISES Date 11/14/23   Date 11/16/23 Date Date   VISIT# # 2 #3 # #    REPS REPS REPS REPS   Nu step 10 L 1 4' L1     Parallel Bars      Rockerboard gastroc stretch                            Foam balance, marches, weight shift A/P and med/lat,                            Standing Side kicks                            Slow High knee marches   20 Sec H x 5     3 mins    Next   Next  20\" H x5   Foam NBOS 30\"x2     10x ea  10x ea     Seated Marches              LAQ              Adduction Ball squeeze 20x  20x leah  5 sec hold x 15 20x                     6 min walk test NEXT  655 ft.                                                                                                                                                                             HEP            Goals:  Active       PT Problem       PT Goals       Start:  11/07/23    Expected End:  12/26/23       In 4-6 weeks pt will demonstrate:    1) Decreased pain in bilateral knees to 1-2/10 max " to allow greater ease with ADL, household chores  2) increased standing and walking tolerance to 45 mins to allow increased mobility in the community and greater ease with household chores.  3) Increased strength to 5/5 bilateral LE to allow greater ease with household tasks, ambulation, and ADL's

## 2023-11-21 ENCOUNTER — TREATMENT (OUTPATIENT)
Dept: PHYSICAL THERAPY | Facility: HOSPITAL | Age: 68
End: 2023-11-21
Payer: COMMERCIAL

## 2023-11-21 DIAGNOSIS — G35 MULTIPLE SCLEROSIS (MULTI): ICD-10-CM

## 2023-11-21 DIAGNOSIS — G89.29 CHRONIC PAIN OF BOTH SHOULDERS: ICD-10-CM

## 2023-11-21 DIAGNOSIS — M17.0 PRIMARY OSTEOARTHRITIS OF BOTH KNEES: Primary | ICD-10-CM

## 2023-11-21 DIAGNOSIS — M25.512 CHRONIC PAIN OF BOTH SHOULDERS: ICD-10-CM

## 2023-11-21 DIAGNOSIS — M25.511 CHRONIC PAIN OF BOTH SHOULDERS: ICD-10-CM

## 2023-11-21 DIAGNOSIS — M17.10 ARTHRITIS OF KNEE: ICD-10-CM

## 2023-11-21 PROCEDURE — 97113 AQUATIC THERAPY/EXERCISES: CPT | Mod: GP,CQ

## 2023-11-21 ASSESSMENT — PAIN - FUNCTIONAL ASSESSMENT: PAIN_FUNCTIONAL_ASSESSMENT: 0-10

## 2023-11-21 ASSESSMENT — PAIN SCALES - GENERAL: PAINLEVEL_OUTOF10: 4

## 2023-11-21 NOTE — PROGRESS NOTES
"Physical Therapy    Physical Therapy Treatment    Patient Name: Jes Roper  MRN: [id number]  Today's Date: 11/21/2023  Time Calculation  Start Time: 0150  Stop Time: 0242  Time Calculation (min): 52 min      Assessment:   Patient needed moderate vc's for posture with ambulation exercises. Patient able to complete exercises without increasing sx.     Plan:   Add step ups next visit.    Current Problem  1. Primary osteoarthritis of both knees        2. Arthritis of knee        3. Multiple sclerosis (CMS/HCC)        4. Chronic pain of both shoulders          Subjective    Patient noted she felt some soreness after last land visit but felt better the next day.     Precautions  Precautions  STEADI Fall Risk Score (The score of 4 or more indicates an increased risk of falling): 10  Vital Signs     Pain  Pain Assessment  Pain Assessment: 0-10  Pain Score: 4  Pain Location: Knee  Pain Orientation: Left, Right    Objective   Initiated aqua therapy     Treatments:  Aquatic Exercise  Aquatic Exercise Performed: Yes  Aquatic Exercise Activity 1: ambulation forward, retro, lateral x2 laps  Aquatic Exercise Activity 2: Marches x1 lap  Aquatic Exercise Activity 3: HR AND TR X15  Aquatic Exercise Activity 4: squats x10  Aquatic Exercise Activity 5: SLR retro, lateral, forward X 10 EA  Aquatic Exercise Activity 6: distraction flexion x5 min  Aquatic Exercise Activity 7: hs and gastroc stretches 3x15\" each      Goals:  Active       PT Problem       PT Goals       Start:  11/07/23    Expected End:  12/26/23       In 4-6 weeks pt will demonstrate:    1) Decreased pain in bilateral knees to 1-2/10 max to allow greater ease with ADL, household chores  2) increased standing and walking tolerance to 45 mins to allow increased mobility in the community and greater ease with household chores.  3) Increased strength to 5/5 bilateral LE to allow greater ease with household tasks, ambulation, and ADL's           "

## 2023-11-27 ENCOUNTER — DOCUMENTATION (OUTPATIENT)
Dept: PHYSICAL THERAPY | Facility: HOSPITAL | Age: 68
End: 2023-11-27
Payer: COMMERCIAL

## 2023-11-27 ENCOUNTER — APPOINTMENT (OUTPATIENT)
Dept: PHYSICAL THERAPY | Facility: HOSPITAL | Age: 68
End: 2023-11-27
Payer: COMMERCIAL

## 2023-11-27 NOTE — PROGRESS NOTES
Physical Therapy                 Therapy Communication Note    Patient Name: Jes Roper  MRN: [id number]  Today's Date: 11/27/2023     Discipline: Physical Therapy    Missed Visit Reason:      Missed Time: Cancel    Comment:

## 2023-11-29 ENCOUNTER — APPOINTMENT (OUTPATIENT)
Dept: PHYSICAL THERAPY | Facility: HOSPITAL | Age: 68
End: 2023-11-29
Payer: COMMERCIAL

## 2023-11-29 ENCOUNTER — APPOINTMENT (OUTPATIENT)
Dept: PRIMARY CARE | Facility: CLINIC | Age: 68
End: 2023-11-29
Payer: COMMERCIAL

## 2023-11-30 ENCOUNTER — OFFICE VISIT (OUTPATIENT)
Dept: PHYSICAL MEDICINE AND REHAB | Facility: CLINIC | Age: 68
End: 2023-11-30
Payer: COMMERCIAL

## 2023-11-30 ENCOUNTER — OFFICE VISIT (OUTPATIENT)
Dept: PRIMARY CARE | Facility: CLINIC | Age: 68
End: 2023-11-30
Payer: COMMERCIAL

## 2023-11-30 ENCOUNTER — LAB (OUTPATIENT)
Dept: LAB | Facility: LAB | Age: 68
End: 2023-11-30
Payer: COMMERCIAL

## 2023-11-30 VITALS
TEMPERATURE: 98.8 F | WEIGHT: 175 LBS | BODY MASS INDEX: 35.35 KG/M2 | DIASTOLIC BLOOD PRESSURE: 68 MMHG | SYSTOLIC BLOOD PRESSURE: 122 MMHG

## 2023-11-30 VITALS — RESPIRATION RATE: 24 BRPM | HEART RATE: 85 BPM | SYSTOLIC BLOOD PRESSURE: 123 MMHG | DIASTOLIC BLOOD PRESSURE: 59 MMHG

## 2023-11-30 DIAGNOSIS — I42.9 CARDIOMYOPATHY, UNSPECIFIED TYPE (MULTI): ICD-10-CM

## 2023-11-30 DIAGNOSIS — M79.7 FIBROMYALGIA: Primary | ICD-10-CM

## 2023-11-30 DIAGNOSIS — M06.9 RHEUMATOID ARTHRITIS, INVOLVING UNSPECIFIED SITE, UNSPECIFIED WHETHER RHEUMATOID FACTOR PRESENT (MULTI): ICD-10-CM

## 2023-11-30 DIAGNOSIS — M17.10 ARTHRITIS OF KNEE: Primary | ICD-10-CM

## 2023-11-30 DIAGNOSIS — J42 CHRONIC BRONCHITIS, UNSPECIFIED CHRONIC BRONCHITIS TYPE (MULTI): ICD-10-CM

## 2023-11-30 DIAGNOSIS — E11.69 COMBINED HYPERLIPIDEMIA ASSOCIATED WITH TYPE 2 DIABETES MELLITUS (MULTI): ICD-10-CM

## 2023-11-30 DIAGNOSIS — R26.9 GAIT ABNORMALITY: ICD-10-CM

## 2023-11-30 DIAGNOSIS — G35 MULTIPLE SCLEROSIS (MULTI): ICD-10-CM

## 2023-11-30 DIAGNOSIS — E78.2 COMBINED HYPERLIPIDEMIA ASSOCIATED WITH TYPE 2 DIABETES MELLITUS (MULTI): ICD-10-CM

## 2023-11-30 DIAGNOSIS — R39.9 URINARY SYMPTOM OR SIGN: ICD-10-CM

## 2023-11-30 DIAGNOSIS — N18.31 STAGE 3A CHRONIC KIDNEY DISEASE (MULTI): ICD-10-CM

## 2023-11-30 DIAGNOSIS — R39.9 URINARY SYMPTOM OR SIGN: Primary | ICD-10-CM

## 2023-11-30 DIAGNOSIS — E66.01 OBESITY, MORBID (MULTI): ICD-10-CM

## 2023-11-30 DIAGNOSIS — I73.9 ARTERIAL INSUFFICIENCY OF LOWER EXTREMITY (CMS-HCC): ICD-10-CM

## 2023-11-30 LAB
APPEARANCE UR: CLEAR
BILIRUB UR STRIP.AUTO-MCNC: NEGATIVE MG/DL
COLOR UR: YELLOW
GLUCOSE UR STRIP.AUTO-MCNC: ABNORMAL MG/DL
HYALINE CASTS #/AREA URNS AUTO: ABNORMAL /LPF
KETONES UR STRIP.AUTO-MCNC: NEGATIVE MG/DL
LEUKOCYTE ESTERASE UR QL STRIP.AUTO: NEGATIVE
MUCOUS THREADS #/AREA URNS AUTO: ABNORMAL /LPF
NITRITE UR QL STRIP.AUTO: NEGATIVE
PH UR STRIP.AUTO: 5 [PH]
PROT UR STRIP.AUTO-MCNC: ABNORMAL MG/DL
RBC # UR STRIP.AUTO: NEGATIVE /UL
RBC #/AREA URNS AUTO: ABNORMAL /HPF
SP GR UR STRIP.AUTO: 1.03
SQUAMOUS #/AREA URNS AUTO: ABNORMAL /HPF
UROBILINOGEN UR STRIP.AUTO-MCNC: <2 MG/DL
WBC #/AREA URNS AUTO: ABNORMAL /HPF

## 2023-11-30 PROCEDURE — 1036F TOBACCO NON-USER: CPT | Performed by: PHYSICAL MEDICINE & REHABILITATION

## 2023-11-30 PROCEDURE — 4010F ACE/ARB THERAPY RXD/TAKEN: CPT | Performed by: PHYSICAL MEDICINE & REHABILITATION

## 2023-11-30 PROCEDURE — 3078F DIAST BP <80 MM HG: CPT | Performed by: INTERNAL MEDICINE

## 2023-11-30 PROCEDURE — 99214 OFFICE O/P EST MOD 30 MIN: CPT | Performed by: INTERNAL MEDICINE

## 2023-11-30 PROCEDURE — 1160F RVW MEDS BY RX/DR IN RCRD: CPT | Performed by: PHYSICAL MEDICINE & REHABILITATION

## 2023-11-30 PROCEDURE — 3074F SYST BP LT 130 MM HG: CPT | Performed by: PHYSICAL MEDICINE & REHABILITATION

## 2023-11-30 PROCEDURE — 1036F TOBACCO NON-USER: CPT | Performed by: INTERNAL MEDICINE

## 2023-11-30 PROCEDURE — 4010F ACE/ARB THERAPY RXD/TAKEN: CPT | Performed by: INTERNAL MEDICINE

## 2023-11-30 PROCEDURE — 99214 OFFICE O/P EST MOD 30 MIN: CPT | Performed by: PHYSICAL MEDICINE & REHABILITATION

## 2023-11-30 PROCEDURE — 1125F AMNT PAIN NOTED PAIN PRSNT: CPT | Performed by: PHYSICAL MEDICINE & REHABILITATION

## 2023-11-30 PROCEDURE — 1160F RVW MEDS BY RX/DR IN RCRD: CPT | Performed by: INTERNAL MEDICINE

## 2023-11-30 PROCEDURE — 87086 URINE CULTURE/COLONY COUNT: CPT

## 2023-11-30 PROCEDURE — 1125F AMNT PAIN NOTED PAIN PRSNT: CPT | Performed by: INTERNAL MEDICINE

## 2023-11-30 PROCEDURE — 81001 URINALYSIS AUTO W/SCOPE: CPT

## 2023-11-30 PROCEDURE — 1159F MED LIST DOCD IN RCRD: CPT | Performed by: INTERNAL MEDICINE

## 2023-11-30 PROCEDURE — 1159F MED LIST DOCD IN RCRD: CPT | Performed by: PHYSICAL MEDICINE & REHABILITATION

## 2023-11-30 PROCEDURE — 3074F SYST BP LT 130 MM HG: CPT | Performed by: INTERNAL MEDICINE

## 2023-11-30 PROCEDURE — 3078F DIAST BP <80 MM HG: CPT | Performed by: PHYSICAL MEDICINE & REHABILITATION

## 2023-11-30 RX ORDER — PREGABALIN 200 MG/1
200 CAPSULE ORAL DAILY
COMMUNITY
End: 2023-11-30 | Stop reason: SDUPTHER

## 2023-11-30 RX ORDER — PREGABALIN 100 MG/1
200 CAPSULE ORAL DAILY
Qty: 60 CAPSULE | Refills: 2 | Status: SHIPPED | OUTPATIENT
Start: 2023-11-30 | End: 2024-02-12

## 2023-11-30 ASSESSMENT — PATIENT HEALTH QUESTIONNAIRE - PHQ9
2. FEELING DOWN, DEPRESSED OR HOPELESS: SEVERAL DAYS
1. LITTLE INTEREST OR PLEASURE IN DOING THINGS: SEVERAL DAYS
SUM OF ALL RESPONSES TO PHQ9 QUESTIONS 1 AND 2: 2

## 2023-11-30 ASSESSMENT — ENCOUNTER SYMPTOMS
DEPRESSION: 1
LOSS OF SENSATION IN FEET: 0
OCCASIONAL FEELINGS OF UNSTEADINESS: 1

## 2023-11-30 ASSESSMENT — COLUMBIA-SUICIDE SEVERITY RATING SCALE - C-SSRS
6. HAVE YOU EVER DONE ANYTHING, STARTED TO DO ANYTHING, OR PREPARED TO DO ANYTHING TO END YOUR LIFE?: NO
2. HAVE YOU ACTUALLY HAD ANY THOUGHTS OF KILLING YOURSELF?: NO
1. IN THE PAST MONTH, HAVE YOU WISHED YOU WERE DEAD OR WISHED YOU COULD GO TO SLEEP AND NOT WAKE UP?: NO

## 2023-11-30 ASSESSMENT — PAIN SCALES - GENERAL: PAINLEVEL: 4

## 2023-11-30 NOTE — PROGRESS NOTES
Physical Medicine and Rehabilitation [facility]    11/30/23    Chief Complaint:  Neck Pain     HPI:  Jes Roper is a 68 y.o. old w pmh of Ms follows w neurologist, fibromyalgia ( on plaquenil w some improvement, had elevated crp), renal insufficiency,  female who presents to the clinic today at the request of Dr. Smith for evaluation of neck pain and upper shoulder pain.    She also has bl knee oa, needs replacement, but injections stopped working. Last was in July 2023 wo relief. She has had series of injections in the past that were helpful.  She uses tens, ice and heat. Tried otc braces wo relief. Was on mobic,  but now has renal insufficiency. Voltaren gel w mild relief. Since recent has been taking 1500 mg tid.  Lidocaine patches short term.    She is on lyrica for general pain,.  She has a ho of right reverse total shoulder arthroplasty and biceps tenodesis 10/2021 w improvement in ROM of shoulder and pain.      Of note she had has a history of MS. For mobility she does not use any assistive devices, states now a little off balance. Two days ago tripped over a step next to bed.  At store uses a scooter due to knees,ms and sob.    Re neck pain  Onset:   Location:over 20 years, getting worse  Radiation: upper shoulders, occasionally down to fingers  Quality: sharp, dull achy and burning  Duration: comes and goes  Aggravating factors:  looking down stress  Alleviating factors: meds lyrica cymbalta bengay, tens  Severity: 4  Numbness/Tingling: No rarely  Bowel or bladder incontinence: Yes bladder due to MS urge  Current medication regimen:as above     Treatment to date:  Physical therapy: No   Medications taken to date for this complaint include the following:   As above  Prior injections: No        She was last seen in October 2023. At that time we discussed:  XR bl shoulders, R knee., R tib/fib. Ho of R shoulder replacement. Could not bear weight RLE. Toe touch.  Xr C spine  PT for rom, gait, strengthening,  water therapy  Referral to pain medicine to eval for bl genicular nerve blocks if steroid injections are only short term relief again and if not surgical candidate  Already on meds as per rheum; lyrica, Cymbalta. Has renal insufficieny should not take nsaids.  No more than 3-4,000 mg of tylenol/day. Currently taking 4500  7. Bl don nancy knee reaction braces    Braces help but slide down. Tried different ways. Xrays C spine, knees and shoulders shows degenerative changes.  Will have mri of L spine and C spine; ordered for MS.  MRI scheduled at [facility].  Seeing pain next week for genicular nerve blocks.  She is taking in less tylenol.  Some pain in R shoulder. Doing therapy, going ok, tires her out. In therapy she is focused on water and land,.  Stretching muscles.     Imaging  Reviewed 11/30/23  Xr tibia 10/2023  FINDINGS:  No acute fracture or malalignment of the right tibia or fibula.  Moderate medial and patellofemoral and mild lateral compartment  degenerative changes are noted of the right knee with joint space  loss and osteophytosis.      IMPRESSION:  No acute fracture or malalignment of the right tibia or fibula.  Moderate medial and patellofemoral compartment degenerative changes  of the right knee.  Xr c spine 10/2023  Narrative & Impression   Interpreted By:  Deya Guzmán,   STUDY:  Cervical spine, four views      INDICATION:  Signs/Symptoms:eval djd.      COMPARISON:  08/23/2019.      ACCESSION NUMBER(S):  [id number]      ORDERING CLINICIAN:  MARISA VALENZUELA      FINDINGS:  Grade 1 anterolisthesis at C2-3 and to a lesser degree at C3-4 and  C5-6. Moderate mid and lower cervical facet joint degenerative  changes noted. Disc heights are maintained. Mild diffuse spondylosis  of the cervical spine with anterior osteophytes evident. Vertebral  body heights are preserved. Posterior elements are intact.  Prevertebral soft tissues are unremarkable.      IMPRESSION:  1. Moderate mid and lower  cervical facet arthropathy.  2. Grade 1 anterolisthesis at C2-3 and C3-4 as well as C5-6.     Xr R knee   FINDINGS:  No acute fracture or malalignment.  Moderate medial and patellofemoral compartment degenerative changes  with joint space loss and osteophytes. Small knee joint effusion.  Soft tissues are unremarkable.      IMPRESSION:  1. Moderate medial and patellofemoral compartment osteoarthrosis.  Small knee joint effusion.  2. No acute fracture or malalignment of the right knee.  Xr L shoulder 10/2023  FINDINGS:  No acute fracture or malalignment.  Moderate acromioclavicular and glenohumeral joint degenerative  changes with joint space loss and osteophytes. Soft tissues are  unremarkable.      IMPRESSION:  1. Moderate acromioclavicular and glenohumeral joint osteoarthrosis.      Xr R shoulder 10/2023  FINDINGS:  Patient is status post right reverse total shoulder arthroplasty.  Hardware is intact without perihardware fractures or lucencies.  Moderate acromioclavicular joint degenerative changes with joint  space loss and osteophytes evident. No malalignment.      IMPRESSION:  1. Right reverse total shoulder arthroplasty without hardware  complication.  2. Moderate acromioclavicular joint osteoarthrosis.  Xr knees 7/2023     FINDINGS:  Bilateral knees, four views of each     There is severe medial and moderate to severe lateral and  patellofemoral compartment joint space narrowing osteophytosis and  sclerosis. There is genu varum deformity bilaterally. There is a  moderate-sized effusion bilaterally as well. Several intra-articular  bodies present     IMPRESSION:  Severe bilateral knee osteoarthritis worse in the medial compartment.  Intra-articular bodies present. There is bilateral genu varum.     Past Medical History:   Diagnosis Date    Abnormal findings on diagnostic imaging of other specified body structures 12/30/2022    Abnormal chest CT    Anemia, unspecified 12/17/2021    Anemia    Anxiety disorder,  unspecified 09/26/2013    Anxiety    Cervicalgia 12/06/2019    Neck pain    Chronic cough 09/22/2022    Persistent cough    Disorder of kidney and ureter, unspecified 12/30/2022    Abnormal renal function    Disorder of lipoprotein metabolism, 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    Abnormal serum cholesterol    Disorder of pigmentation, unspecified 03/22/2022    Discoloration of skin of toe    Dizziness and giddiness 07/01/2022    Dizziness    Dorsalgia, unspecified 12/30/2022    Back pain    Edema, unspecified 07/17/2019    Edema    Encounter for fitting and adjustment of other specified devices 01/19/2021    Fitting and adjustment of pessary    Encounter for general adult medical examination without abnormal findings 03/22/2022    Encounter for Medicare annual wellness exam    Encounter for gynecological examination (general) (routine) without abnormal findings 12/05/2018    Visit for gynecologic examination    Encounter for gynecological examination (general) (routine) without abnormal findings 03/17/2016    Encounter for gynecological examination without abnormal finding    Encounter for other screening for malignant neoplasm of breast 11/01/2019    Encounter for screening for malignant neoplasm of breast    Encounter for other screening for malignant neoplasm of breast 03/22/2022    Breast screening    Fibromyalgia 09/29/2022    Fibromyalgia    Hypothyroidism, unspecified 03/22/2022    Hypothyroidism    Local infection of the skin and subcutaneous tissue, unspecified 02/22/2019    Toe infection    Multiple sclerosis (CMS/HCC) 12/06/2022    Multiple sclerosis    Obesity, unspecified 11/21/2016    Mild obesity    Other age-related incipient cataract, bilateral 09/22/2022    Other age-related incipient cataract of both eyes    Other chronic pain 07/01/2022    Chronic pain    Other specified disorders of urethra 09/08/2020    Prolapse of urethra    Pain in right knee 06/22/2022    Right knee pain    Peripheral vascular disease,  unspecified (CMS/[facility]) 2022    Arterial insufficiency of lower extremity    Personal history of other diseases of urinary system 2020    History of prolapse of bladder    Personal history of other endocrine, nutritional and metabolic disease 2014    History of hypothyroidism    Personal history of other specified conditions 2022    History of chronic cough    Sciatica, right side 2019    Right sciatic nerve pain    Sleep apnea, unspecified 04/10/2017    Sleep apnea    Torticollis 2022    Torticollis    Tremor, unspecified 2022    Tremor of both hands    Unspecified cataract     Cataracts, both eyes    Unspecified skin changes 2022    Skin change    Unspecified symptoms and signs involving the genitourinary system 2021    Urinary symptom or sign        Past Surgical History:   Procedure Laterality Date     SECTION, CLASSIC  10/03/2018     Section    GALLBLADDER SURGERY  2020    Gallbladder Surgery    OTHER SURGICAL HISTORY  2021    Shoulder replacement    OTHER SURGICAL HISTORY  2020    Exploratory laparotomy    OTHER SURGICAL HISTORY  2020    Urethroplasty        Patient Active Problem List    Diagnosis Date Noted    Arthritis of knee 2023    Abnormal chest CT 2023    Abnormal coordination 2023    Abnormal finding of kidney 2023    Abnormal renal function 2023    Abnormal serum cholesterol 2023    Anemia 2023    Arterial insufficiency of lower extremity (CMS/[facility]) 2023    Astigmatism 2023    Asymptomatic microscopic hematuria 2023    Attention deficit disorder without hyperactivity 2023    Chronic systolic heart failure (CMS/[facility]) 2023    Combined form of age-related cataract, left eye 2023    Combined hyperlipidemia associated with type 2 diabetes mellitus (CMS/[facility]) 2023    De Quervain's tenosynovitis 2023    Decreased strength  11/06/2023    Edema 11/06/2023    Excessive daytime sleepiness 11/06/2023    Female stress incontinence 11/06/2023    Gait abnormality 11/06/2023    Generalized osteoarthritis of multiple sites 11/06/2023    GERD (gastroesophageal reflux disease) 11/06/2023    Gross hematuria 11/06/2023    Hypertension associated with diabetes (CMS/[facility]) 11/06/2023    Hypothyroidism 11/06/2023    Pulmonary nodule 11/06/2023    Adult situational stress disorder 11/06/2023    Dyspnea on exertion 11/06/2023    Rotator cuff tear 11/06/2023    Stage 3a chronic kidney disease (CMS/HCC) 11/06/2023    Primary osteoarthritis of both knees 05/30/2023    Shoulder pain 05/30/2023    Obstructive sleep apnea syndrome 12/12/2019    Recurrent major depression in partial remission (CMS/[facility]) 03/06/2019    Cervical segment dysfunction 02/27/2018    Thoracic segment dysfunction 02/27/2018    Chronic pain 02/26/2018    Spondylolisthesis of lumbar region 02/26/2018    Cervical spondylosis without myelopathy 01/30/2018    Displacement of cervical intervertebral disc without myelopathy 01/30/2018    Cardiomyopathy (CMS/HCC) 08/06/2002    Essential hypertension, benign 08/06/2002    Multiple sclerosis (CMS/[facility]) 08/06/2002    Peptic ulcer 08/06/2002        Family History   Problem Relation Name Age of Onset    Diabetes Mother      Hypertension Mother      Skin cancer Mother      Other (HEPATIC CIRRHOSIS [Other]) Father      No Known Problems Sister      Other (AMD) Brother      Retinal detachment Daughter      Retinal detachment Son      Cervical cancer Mother's Sister      Cervical cancer Maternal Grandmother          Current Outpatient Medications   Medication Sig Dispense Refill    aspirin 81 mg EC tablet Take 1 tablet (81 mg) by mouth every 12 hours.      buPROPion XL (Wellbutrin XL) 300 mg 24 hr tablet Take 1 tablet (300 mg) by mouth once daily.      cholecalciferol (Vitamin D-3) 50 MCG (2000 UT) tablet Take 1 tablet (2,000 Units) by mouth once daily.       cyanocobalamin (Vitamin B-12) 100 mcg tablet Take 1 tablet (100 mcg) by mouth once daily.      dapagliflozin (Farxiga) 10 mg Take 1 tablet (10 mg) by mouth once daily.      diazePAM (Valium) 10 mg tablet Take 1 tablet (10 mg) by mouth twice a day.      diroximel fumarate (Vumerity) 231 mg capsule,delayed release(DR/EC) Take 231 mg by mouth 2 times a day. Take 2 tabs BID      DULoxetine (Cymbalta) 60 mg DR capsule Take 1 capsule (60 mg) by mouth once daily.      fluticasone (Flonase) 50 mcg/actuation nasal spray Administer 2 sprays into each nostril once daily.      hydroxychloroquine (Plaquenil) 200 mg tablet Take 2 tablets (400 mg) by mouth once daily.      hyoscyamine ER (Levbid) 0.375 mg 12 hr tablet Take 1 tablet (0.375 mg) by mouth once daily.      levothyroxine (Synthroid, Levoxyl) 50 mcg tablet Take 1 tablet (50 mcg) by mouth once daily.      losartan (Cozaar) 100 mg tablet Take 1 tablet (100 mg) by mouth once daily.      metoprolol succinate XL (Toprol-XL) 200 mg 24 hr tablet Take 1 tablet (200 mg) by mouth once daily.      multivit-min-iron-FA-vit K-lut (Centrum Silver Women) 8 mg iron-400 mcg-50 mcg tablet Take 1 tablet by mouth once daily.      multivit-mineral-iron-lutein tablet Take 1 tablet by mouth once daily.      omeprazole (PriLOSEC) 40 mg DR capsule Take 1 capsule (40 mg) by mouth once daily in the morning. Take before meals.      potassium chloride (Klor-Con) 20 mEq packet Take 20 mEq by mouth.      pregabalin (Lyrica) 200 mg capsule Take 1 capsule (200 mg) by mouth once daily.      spironolactone (Aldactone) 25 mg tablet Take 1 tablet (25 mg) by mouth once daily.      tiZANidine (Zanaflex) 2 mg tablet Take 1 tablet (2 mg) by mouth.      torsemide (Demadex) 20 mg tablet Take 1 tablet (20 mg) by mouth once daily.      furosemide (Lasix) 20 mg tablet Take 1 tablet (20 mg) by mouth once daily.      LYCOPENE ORAL Take 1 tablet by mouth once daily.      modafinil (Provigil) 200 mg tablet Take 1  tablet (200 mg) by mouth twice a day. 60 tablet 2    pregabalin (Lyrica) 25 mg capsule Take 1 capsule (25 mg) by mouth twice a day.      pregabalin (Lyrica) 75 mg capsule Take 1 capsule (75 mg) by mouth once daily.      thyroid, pork, ([place] Thyroid) 30 mg tablet Take 1 tablet (30 mg) by mouth once daily.       No current facility-administered medications for this visit.        Allergies   Allergen Reactions    Naltrexone Unknown    Narcan [Naloxone] Unknown    Penicillin Unknown        Social History     Socioeconomic History    Marital status:      Spouse name: Not on file    Number of children: Not on file    Years of education: Not on file    Highest education level: Not on file   Occupational History    Not on file   Tobacco Use    Smoking status: Never    Smokeless tobacco: Never   Substance and Sexual Activity    Alcohol use: Never    Drug use: Never    Sexual activity: Not on file   Other Topics Concern    Not on file   Social History Narrative    Not on file     Social Determinants of Health     Financial Resource Strain: Not on file   Food Insecurity: Not on file   Transportation Needs: Not on file   Physical Activity: Not on file   Stress: Not on file   Social Connections: Not on file   Intimate Partner Violence: Not on file   Housing Stability: Not on file        Review of Systems  Constitutional:  Denies fever or chills   Eyes:  Denies change in visual acuity   HENT:  Denies nasal congestion or sore throat   Respiratory:  Denies cough or shortness of breath   Cardiovascular:  Denies chest pain or edema   GI:  Denies abdominal pain, nausea, vomiting, bloody stools or diarrhea   :  Denies dysuria   Integument:  Denies rash   Neurologic: as per HPI  MSK: Per above HPI  Endocrine:  Denies polyuria or polydipsia   Lymphatic:  Denies swollen glands   Psychiatric:  Denies depression or anxiety         Physical Exam:  /59 (BP Location: Left arm, Patient Position: Sitting)   Pulse 85   Resp 24      General:  NAD, F    Psychiatric: appropriate mood & affect.   Cardiovascular:  Normal radial pulses; no UE  edema.  Respiratory:  Normal rate; unlabored breathing.  Skin:  Intact; no erythema; no ecchymosis or rash.  Lymphatic:  No lymphadenopathy or lymphedema.  NEURO:  Alert and appropriate.  Speech fluent, conversing appropriately.   Motor:    Rt: SA 5/5, SER 5/5, EE 5/5, EF 5/5, WE 5/5, FDIM 5/5, ADM 5/5    Lt: SA 5/5, SER 5/5, EE 5/5, EF 5/5, WE 5/5, FDIM 5/5, ADM 5/5    Rt: HF 4/5    Lt: HF 4/5  Sensation:     Light touch intact in the b/l C5-T2 dermatomes.     PP: intact in the b/l C5-T2 dermatomes.  Reflexes:     Right: Biceps 2+, brachioradialis 2+, triceps 2+, patellar 2+, achilles 2+    Left: Biceps 2+, brachioradialis 2+, triceps 2+, patellar 2+, achilles 2+     Babinski's downgoing; no clonus     Gait: antalgic gait w braces wo cane, slow arnie  MSK:  Inspection of the neck reveals no soft tissue swelling or ecchymoses.   Pain w cervical rom  Shoulder rom passive intact but pain end range but less than prior bl    There is tenderness over the C spine paraspinals  R knee patella, throughout R lower leg, no redness/swelling RLE          Impression:  Jes Roper is a 68 y.o. f w pmh of MS, renal insufficiency, fibromyalgia, HTN, CHF presenting with severe knee oa, last injections w no relief as well as neck pain likely due to spondylosis. She had a recent fall and also has worsening R knee pain, lower leg pain as well as b/l shoulder pain, left more than right.       Plan:  She had a recent fall and did not go to Ed. Fell on her R knee and shoulders.   XR bl shoulders, R knee., R tib/fib. Ho of R shoulder replacement. Could not bear weight RLE. Toe touch. Negative for fx demonstrates arthritic changes.  Xr C spine degenerative changes, pending mri  PT for rom, gait, strengthening, water therapy  Referral to pain medicine to eval for bl genicular nerve blocks if steroid injections are only  short term relief again and if not surgical candidate; pending  She will also have mris of her C and L spine as per her neurologist as well  Already on meds as per rheum; lyrica, Cymbalta. Has renal insufficieny should not take nsaids.  Has cut down on acetaminophen, no more than 3000 mg/day  7. Bl don nancy knee reaction braces, discussed to trial wo the sleeve or try tubi , will send to her house. Might slip less. Fit looks appropriate and they do help but do not stay on well.     Fu 8 weeks       Lsia Marie MD      Physical Medicine and Rehabilitation

## 2023-11-30 NOTE — PROGRESS NOTES
Subjective   Patient ID: Jes Roper is a 68 y.o. female who presents for FUV, Chills (Hot and cold sensation), and Urinary Incontinence.    HPI  Patient in for a visit  Not feeling well , urinary changes urgency and frequency, hot and cold   10/17 she fell not sure if it was due to the Ms or the fall made the MS worse   Started PT but called off this week not feeling well   Seeing Dr Farias for her MS and he has ordered imaging  MRI on  to see if MS is progressing   Her mother  in [place] she got home from our visit and her mother was not breathing and hospice confirmed   Easy brusing  Review of Systems  ROS  :    General: Denies fever, chills, night sweats,  Gastrointestinal: Negative for nausea/vomiting, abdominal pain, changes in bowel habits  Genitourinary:see hpi  negative for URINARY INCONTINENCE prior to this UTI   Neurological: Negative for headaches, dizziness    Previous history  Past Medical History:   Diagnosis Date    Abnormal findings on diagnostic imaging of other specified body structures 2022    Abnormal chest CT    Anemia, unspecified 2021    Anemia    Anxiety disorder, unspecified 2013    Anxiety    Cervicalgia 2019    Neck pain    Chronic cough 2022    Persistent cough    Disorder of kidney and ureter, unspecified 2022    Abnormal renal function    Disorder of lipoprotein metabolism, unspecified 2015    Abnormal serum cholesterol    Disorder of pigmentation, unspecified 2022    Discoloration of skin of toe    Dizziness and giddiness 2022    Dizziness    Dorsalgia, unspecified 2022    Back pain    Edema, unspecified 2019    Edema    Encounter for fitting and adjustment of other specified devices 2021    Fitting and adjustment of pessary    Encounter for general adult medical examination without abnormal findings 2022    Encounter for Medicare annual wellness exam    Encounter for gynecological  examination (general) (routine) without abnormal findings 12/05/2018    Visit for gynecologic examination    Encounter for gynecological examination (general) (routine) without abnormal findings 03/17/2016    Encounter for gynecological examination without abnormal finding    Encounter for other screening for malignant neoplasm of breast 11/01/2019    Encounter for screening for malignant neoplasm of breast    Encounter for other screening for malignant neoplasm of breast 03/22/2022    Breast screening    Fibromyalgia 09/29/2022    Fibromyalgia    Hypothyroidism, unspecified 03/22/2022    Hypothyroidism    Local infection of the skin and subcutaneous tissue, unspecified 02/22/2019    Toe infection    Multiple sclerosis (CMS/[facility]) 12/06/2022    Multiple sclerosis    Obesity, unspecified 11/21/2016    Mild obesity    Other age-related incipient cataract, bilateral 09/22/2022    Other age-related incipient cataract of both eyes    Other chronic pain 07/01/2022    Chronic pain    Other specified disorders of urethra 09/08/2020    Prolapse of urethra    Pain in right knee 06/22/2022    Right knee pain    Peripheral vascular disease, unspecified (CMS/[facility]) 04/07/2022    Arterial insufficiency of lower extremity    Personal history of other diseases of urinary system 05/06/2020    History of prolapse of bladder    Personal history of other endocrine, nutritional and metabolic disease 06/11/2014    History of hypothyroidism    Personal history of other specified conditions 12/30/2022    History of chronic cough    Sciatica, right side 08/06/2019    Right sciatic nerve pain    Sleep apnea, unspecified 04/10/2017    Sleep apnea    Torticollis 03/22/2022    Torticollis    Tremor, unspecified 09/22/2022    Tremor of both hands    Unspecified cataract     Cataracts, both eyes    Unspecified skin changes 12/30/2022    Skin change    Unspecified symptoms and signs involving the genitourinary system 11/11/2021    Urinary symptom or  sign     Past Surgical History:   Procedure Laterality Date     SECTION, CLASSIC  10/03/2018     Section    GALLBLADDER SURGERY  2020    Gallbladder Surgery    OTHER SURGICAL HISTORY  2021    Shoulder replacement    OTHER SURGICAL HISTORY  2020    Exploratory laparotomy    OTHER SURGICAL HISTORY  2020    Urethroplasty     Social History     Tobacco Use    Smoking status: Never    Smokeless tobacco: Never   Substance Use Topics    Alcohol use: Never    Drug use: Never     Family History   Problem Relation Name Age of Onset    Diabetes Mother      Hypertension Mother      Skin cancer Mother      Other (HEPATIC CIRRHOSIS [Other]) Father      No Known Problems Sister      Other (AMD) Brother      Retinal detachment Daughter      Retinal detachment Son      Cervical cancer Mother's Sister      Cervical cancer Maternal Grandmother       Allergies   Allergen Reactions    Naltrexone Unknown    Narcan [Naloxone] Unknown    Penicillin Unknown     Current Outpatient Medications   Medication Instructions    aspirin 81 mg, oral, Every 12 hours    buPROPion XL (WELLBUTRIN XL) 300 mg, oral, Daily RT    cholecalciferol (VITAMIN D-3) 2,000 Units, oral, Daily RT    cyanocobalamin (VITAMIN B-12) 100 mcg, oral, Daily    dapagliflozin (Farxiga) 10 mg 1 tablet, oral, Daily    diazePAM (VALIUM) 10 mg, oral, 2 times daily    DULoxetine (CYMBALTA) 60 mg, oral, Daily RT    fluticasone (Flonase) 50 mcg/actuation nasal spray 2 sprays, Each Nostril, Daily RT    furosemide (LASIX) 20 mg, oral, Daily RT    hydroxychloroquine (PLAQUENIL) 400 mg, oral, Daily RT    hyoscyamine ER (Levbid) 0.375 mg 12 hr tablet 1 tablet, oral, Daily    levothyroxine (SYNTHROID, LEVOXYL) 50 mcg, oral, Daily    losartan (COZAAR) 100 mg, oral, Daily RT    LYCOPENE ORAL 1 tablet, oral, Daily    metoprolol succinate XL (TOPROL-XL) 200 mg, oral, Daily    modafinil (PROVIGIL) 200 mg, oral, 2 times daily    multivit-min-iron-FA-vit  K-lut (Centrum Silver Women) 8 mg iron-400 mcg-50 mcg tablet 1 tablet, oral, Daily    multivit-mineral-iron-lutein tablet 1 tablet, oral, Daily    omeprazole (PRILOSEC) 40 mg, oral, Daily before breakfast    potassium chloride (Klor-Con) 20 mEq packet 20 mEq, oral    pregabalin (LYRICA) 200 mg, oral, Daily    spironolactone (Aldactone) 25 mg tablet 1 tablet, oral, Daily    thyroid (pork) (ARMOUR THYROID) 30 mg, oral, Daily RT    tiZANidine (ZANAFLEX) 2 mg, oral    torsemide (DEMADEX) 20 mg, oral, Daily    Vumerity 231 mg, oral, 2 times daily, Take 2 tabs BID       Objective       Physical Exam  Vital Signs: as recorded above  General: Well groomed, well nourished   Orientation:  Alert , oriented to time, place , and person   Mood and Affect:  Cooperative , no apparent distress normal affect  Skin: Good color, good turgor  Eyes: Extra ocular muscle movements intact, anicteric sclerae  Neck: Supple, full range of movement  Chest: Normal breath sounds, normal chest wall exam, symmetric, good air entry, clear to auscultation  Heart: Regular rate and rhythm, without murmur, gallop, or rubs  Abdomen soft nontender no masses felt no hepatosplenomegaly, no rebound or guarding  BACK:  no CTLS spine tenderness, no flank tenderness  Extremities: full range of movement  bilateral UE and bilateral LE,  no lower extremity edema  Neurological: Alert, oriented, cranial nerves II-XII intact except for visual acuity  Sensation:  Intact   Gait: normal steady      Assessment/Plan   Jes Roper is a 68 y.o. female who presents for the concerns below:    Problem List Items Addressed This Visit    None  S/p fall need to restart PT once we figure out if she has a uti or not , lungs are clear     URINARY INCONTINENCE hot and cold ,will check u/a and cx  and treat accordingly     MS MRI is pending for early Dec and seeing DR Farias in ffup ,   CARDIOMYOPATHY  PLAN: stable doing well on current regimen Follow low salt and low  cholesterol diet , encouraged high omega 3 fatty acid intake in diet, exercise as discussed, weight control. Continue current medications or adjust accordingly. Obtain BMP, urine microalbumin, ophthalmology exam. goal < 140/80  , if not done within the past 3-6 months . Obtain AST/ALT, fasting lipid profile, creatine kinase if on statin. Coenzyme Q10 200 mg a day if able to help increase HDL.    Cmp and DM she is on farxiga, stable , ffup with cardiology as scheduled, once in a better condition and feeling better restart PT and exercise     The fall is not a results of PERIPHERAL VASCULAR DSE symptoms , she is on a baby aspirin     Her dtr Adilia can send refills   Discussed with:   Return in :    Portions of this note were generated using digital voice recognition software, and may contain grammatical errors       Ambrosio Gallegos MD  11/30/23  2:19 PM

## 2023-12-01 LAB — BACTERIA UR CULT: NORMAL

## 2023-12-04 ENCOUNTER — TREATMENT (OUTPATIENT)
Dept: PHYSICAL THERAPY | Facility: HOSPITAL | Age: 68
End: 2023-12-04
Payer: COMMERCIAL

## 2023-12-04 DIAGNOSIS — M17.0 PRIMARY OSTEOARTHRITIS OF BOTH KNEES: Primary | ICD-10-CM

## 2023-12-04 DIAGNOSIS — G35 MULTIPLE SCLEROSIS (MULTI): ICD-10-CM

## 2023-12-04 DIAGNOSIS — M17.10 ARTHRITIS OF KNEE: ICD-10-CM

## 2023-12-04 PROCEDURE — 97112 NEUROMUSCULAR REEDUCATION: CPT | Mod: GP,CQ

## 2023-12-04 PROCEDURE — 97110 THERAPEUTIC EXERCISES: CPT | Mod: GP,CQ

## 2023-12-04 ASSESSMENT — PAIN SCALES - GENERAL: PAINLEVEL_OUTOF10: 3

## 2023-12-04 ASSESSMENT — PAIN - FUNCTIONAL ASSESSMENT: PAIN_FUNCTIONAL_ASSESSMENT: 0-10

## 2023-12-04 NOTE — PROGRESS NOTES
"Physical Therapy    Physical Therapy Treatment    Patient Name: Jes Roper  MRN: [id number]  Today's Date: 12/4/2023  Time Calculation  Start Time: 0103  Stop Time: 0145  Time Calculation (min): 42 min        Current Problem  1. Primary osteoarthritis of both knees        2. Arthritis of knee  Follow Up In Physical Therapy      3. Multiple sclerosis (CMS/HCC)              Subjective   Pt reports having shooting and sharp pains on the inside of both knees. Pt states having loss of balance and she thinks it could be from her MS medication. Pt reports not being compliance with HEP but still trying to do some form of exercise. Pt reports furniture walking at home and when in the community, she does use a walker.      Precautions  Precautions  STEADI Fall Risk Score (The score of 4 or more indicates an increased risk of falling): 10       Pain  Pain Assessment: 0-10  Pain Score: 3  Pain Location: Knee    Objective       Treatments:  EXERCISES Date 11/14/23   Date 11/16/23 Date 12/4/23   VISIT# # 2 #3 #    REPS REPS REPS   Nu step 10 L 1 4' L1 L1 8 min    Parallel Bars      Rockerboard gastroc stretch     20 Sec H x 5     20\" H x5    30sec x3   Foam balance 3 min NBOS 30sec x2 NBOS 30sec x2 with no UE support    Foam marches   1 x 10    RB A/P, M/L   1 x 20 each (A/P, M/L)         Seated Marches 20x 20x      LAQ 20x Pierce   1 x 20 pierce    ADD ball squeeze 5sec x15  5 sec x 15    Standing hip ABD NEXT  10x ea 1 x 10 each    Slow high knee marches  NEXT  10x ea  1 lap                6 min walk test NEXT  655 ft.                                                                                                                                                    HEP        Assessment:   Pt tolerated all exercises with minimal difficulty with slight increase in pain. Pt required minimal cueing to have proper exercise form and was better after corrected. The supervising therapist was present for the entire session and made all " clinical decisions.     Plan:   Continue to progress strengthening to help improve posture and decrease pain.       Goals:  Active       PT Problem       PT Goals       Start:  11/07/23    Expected End:  12/26/23       In 4-6 weeks pt will demonstrate:    1) Decreased pain in bilateral knees to 1-2/10 max to allow greater ease with ADL, household chores  2) increased standing and walking tolerance to 45 mins to allow increased mobility in the community and greater ease with household chores.  3) Increased strength to 5/5 bilateral LE to allow greater ease with household tasks, ambulation, and ADL's

## 2023-12-07 ENCOUNTER — OFFICE VISIT (OUTPATIENT)
Dept: PAIN MEDICINE | Facility: HOSPITAL | Age: 68
End: 2023-12-07
Payer: COMMERCIAL

## 2023-12-07 ENCOUNTER — TREATMENT (OUTPATIENT)
Dept: PHYSICAL THERAPY | Facility: HOSPITAL | Age: 68
End: 2023-12-07
Payer: COMMERCIAL

## 2023-12-07 VITALS
HEART RATE: 81 BPM | SYSTOLIC BLOOD PRESSURE: 110 MMHG | WEIGHT: 180.5 LBS | HEIGHT: 60 IN | BODY MASS INDEX: 35.44 KG/M2 | RESPIRATION RATE: 16 BRPM | DIASTOLIC BLOOD PRESSURE: 54 MMHG

## 2023-12-07 DIAGNOSIS — M47.812 CERVICAL SPONDYLOSIS WITHOUT MYELOPATHY: ICD-10-CM

## 2023-12-07 DIAGNOSIS — M17.0 PRIMARY OSTEOARTHRITIS OF BOTH KNEES: Primary | ICD-10-CM

## 2023-12-07 DIAGNOSIS — M17.10 ARTHRITIS OF KNEE: ICD-10-CM

## 2023-12-07 DIAGNOSIS — M17.10 ARTHRITIS OF KNEE: Primary | ICD-10-CM

## 2023-12-07 DIAGNOSIS — M54.12 CERVICAL NEURITIS: ICD-10-CM

## 2023-12-07 PROCEDURE — 1160F RVW MEDS BY RX/DR IN RCRD: CPT | Performed by: PHYSICAL MEDICINE & REHABILITATION

## 2023-12-07 PROCEDURE — 1159F MED LIST DOCD IN RCRD: CPT | Performed by: PHYSICAL MEDICINE & REHABILITATION

## 2023-12-07 PROCEDURE — 99214 OFFICE O/P EST MOD 30 MIN: CPT | Performed by: PHYSICAL MEDICINE & REHABILITATION

## 2023-12-07 PROCEDURE — 1036F TOBACCO NON-USER: CPT | Performed by: PHYSICAL MEDICINE & REHABILITATION

## 2023-12-07 PROCEDURE — 99204 OFFICE O/P NEW MOD 45 MIN: CPT | Performed by: PHYSICAL MEDICINE & REHABILITATION

## 2023-12-07 PROCEDURE — 3078F DIAST BP <80 MM HG: CPT | Performed by: PHYSICAL MEDICINE & REHABILITATION

## 2023-12-07 PROCEDURE — 1125F AMNT PAIN NOTED PAIN PRSNT: CPT | Performed by: PHYSICAL MEDICINE & REHABILITATION

## 2023-12-07 PROCEDURE — 97113 AQUATIC THERAPY/EXERCISES: CPT | Mod: GP,CQ

## 2023-12-07 PROCEDURE — 3074F SYST BP LT 130 MM HG: CPT | Performed by: PHYSICAL MEDICINE & REHABILITATION

## 2023-12-07 PROCEDURE — 4010F ACE/ARB THERAPY RXD/TAKEN: CPT | Performed by: PHYSICAL MEDICINE & REHABILITATION

## 2023-12-07 SDOH — SOCIAL STABILITY: SOCIAL NETWORK: SOCIAL ACTIVITY:: 9

## 2023-12-07 ASSESSMENT — PAIN SCALES - GENERAL: PAINLEVEL_OUTOF10: 5 - MODERATE PAIN

## 2023-12-07 ASSESSMENT — ENCOUNTER SYMPTOMS
OCCASIONAL FEELINGS OF UNSTEADINESS: 1
DEPRESSION: 0
LOSS OF SENSATION IN FEET: 0

## 2023-12-07 ASSESSMENT — PAIN - FUNCTIONAL ASSESSMENT: PAIN_FUNCTIONAL_ASSESSMENT: 0-10

## 2023-12-07 ASSESSMENT — COLUMBIA-SUICIDE SEVERITY RATING SCALE - C-SSRS
2. HAVE YOU ACTUALLY HAD ANY THOUGHTS OF KILLING YOURSELF?: NO
6. HAVE YOU EVER DONE ANYTHING, STARTED TO DO ANYTHING, OR PREPARED TO DO ANYTHING TO END YOUR LIFE?: NO
1. IN THE PAST MONTH, HAVE YOU WISHED YOU WERE DEAD OR WISHED YOU COULD GO TO SLEEP AND NOT WAKE UP?: NO

## 2023-12-07 ASSESSMENT — PAIN DESCRIPTION - DESCRIPTORS: DESCRIPTORS: ACHING;SHARP

## 2023-12-07 NOTE — PROGRESS NOTES
"Physical Therapy    Physical Therapy Treatment    Patient Name: Jes Roper  MRN: [id number]  Today's Date: 12/7/2023  Time Calculation  Start Time: 1300  Stop Time: 1345  Time Calculation (min): 45 min  VISIT 6      Assessment:  PT Assessment  Assessment Comment: pt C/O increased Pierce LE fatigue after treatment pain slight decrease 4/10 in Pierce Knees    Plan:  OP PT Plan  PT Plan:  (progress as able for increased LE strength)    Current Problem  1. Primary osteoarthritis of both knees        2. Arthritis of knee  Follow Up In Physical Therapy          Subjective  pt reports was sick and had to cancel visit today she's feeling a little more sore. Pain L > R Knee       Precautions  Precautions  STEADI Fall Risk Score (The score of 4 or more indicates an increased risk of falling): 10     Pain  Pain Assessment: 0-10  Pain Score: 5 - Moderate pain  Pain Type: Chronic pain  Pain Location: Knee  Pain Orientation: Left, Right (L> R)  Pain Descriptors: Aching, Sharp  Pain Frequency: Constant/continuous    Objective added step ups        Treatments:     Aquatic Exercise  Aquatic Exercise Performed: Yes  Aquatic Exercise Activity 1: ambulation forward, retro, lateral x2 laps  Aquatic Exercise Activity 2: m,arching 2 laps  Aquatic Exercise Activity 3: HR AND TR X15  Aquatic Exercise Activity 4: squats x15  Aquatic Exercise Activity 5: SLR retro, lateral, forward X 15 EA  Aquatic Exercise Activity 6: distraction flexion x5 min  Aquatic Exercise Activity 7: hs and gastroc stretches 3x15\" each  Aquatic Exercise Activity 8: step ups X 10 EA      Goals:  Active       PT Problem       PT Goals       Start:  11/07/23    Expected End:  12/26/23       In 4-6 weeks pt will demonstrate:    1) Decreased pain in bilateral knees to 1-2/10 max to allow greater ease with ADL, household chores  2) increased standing and walking tolerance to 45 mins to allow increased mobility in the community and greater ease with household chores.  3) " Increased strength to 5/5 bilateral LE to allow greater ease with household tasks, ambulation, and ADL's

## 2023-12-07 NOTE — PROGRESS NOTES
Subjective   Patient ID: Jes Roper is a 68 y.o. female who presents for Arthritis and Fibromyalgia.  HPI    Pt is here for initial eval. Pt states she has all over generalized body pain. States she has MS, fibro, and arthritis.     6/10    OA 12/7/23          Pain Disability Index  Family/Home Responsibilities:: 10  Recreation:: 8  Social Activity:: 9  Occupation:: 0  Sexual Behavior:: 0  Self Care:: 8  Life-Support Activities:: 6     Patient says worst of her pain today is her bilateral knees.  She said it is worse when she gets up and stand, and she can feel them catching and sometimes going out on her.  She also says she will periodically feel weakness and numbness tingling in her hands, however she said that is not as bothersome as of late.  She said that she does dry and wet therapy, and is going there later today.  She takes duloxetine with alleviation of some of her symptoms.  Also taking pregabalin.    Monitoring and compliance:    ORT:    PDUQ:    Office Agreement:      Review of Systems   A 13 point comprehensive review of system was negative except for specific complaints as listed in the HPI.  Current Outpatient Medications   Medication Instructions    aspirin 81 mg, oral, Every 12 hours    buPROPion XL (WELLBUTRIN XL) 300 mg, oral, Daily RT    cholecalciferol (VITAMIN D-3) 2,000 Units, oral, Daily RT    cyanocobalamin (VITAMIN B-12) 100 mcg, oral, Daily    dapagliflozin (Farxiga) 10 mg 1 tablet, oral, Daily    diazePAM (VALIUM) 10 mg, oral, Every 12 hours PRN    DULoxetine (CYMBALTA) 60 mg, oral, Daily RT    fluticasone (Flonase) 50 mcg/actuation nasal spray 2 sprays, Each Nostril, Daily RT    hydroxychloroquine (PLAQUENIL) 400 mg, oral, Daily RT    hyoscyamine ER (Levbid) 0.375 mg 12 hr tablet 1 tablet, oral, Daily    levothyroxine (SYNTHROID, LEVOXYL) 50 mcg, oral, Daily    losartan (COZAAR) 100 mg, oral, Daily RT    metoprolol succinate XL (TOPROL-XL) 200 mg, oral, Daily    modafinil  (PROVIGIL) 200 mg, oral, 2 times daily    multivit-min-iron-FA-vit K-lut (Centrum Silver Women) 8 mg iron-400 mcg-50 mcg tablet 1 tablet, oral, Daily    omeprazole (PRILOSEC) 40 mg, oral, Daily before breakfast    potassium chloride (Klor-Con) 20 mEq packet 20 mEq, oral    pregabalin (LYRICA) 200 mg, oral, Daily    spironolactone (Aldactone) 25 mg tablet 1 tablet, oral, Daily    tiZANidine (ZANAFLEX) 2 mg, oral    torsemide (DEMADEX) 20 mg, oral, Daily    Vumerity 231 mg, oral, 2 times daily, Take 2 tabs BID        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    Abnormal serum cholesterol    Disorder of pigmentation, unspecified 03/22/2022    Discoloration of skin of toe    Dizziness and giddiness 07/01/2022    Dizziness    Dorsalgia, unspecified 12/30/2022    Back pain    Edema, unspecified 07/17/2019    Edema    Encounter for fitting and adjustment of other specified devices 01/19/2021    Fitting and adjustment of pessary    Encounter for general adult medical examination without abnormal findings 03/22/2022    Encounter for Medicare annual wellness exam    Encounter for gynecological examination (general) (routine) without abnormal findings 12/05/2018    Visit for gynecologic examination    Encounter for gynecological examination (general) (routine) without abnormal findings 03/17/2016    Encounter for gynecological examination without abnormal finding    Encounter for other screening for malignant neoplasm of breast 11/01/2019    Encounter for screening for malignant neoplasm of  breast    Encounter for other screening for malignant neoplasm of breast 2022    Breast screening    Fibromyalgia 2022    Fibromyalgia    Hypothyroidism, unspecified 2022    Hypothyroidism    Local infection of the skin and subcutaneous tissue, unspecified 2019    Toe infection    Multiple sclerosis (CMS/[facility]) 2022    Multiple sclerosis    Obesity, unspecified 2016    Mild obesity    Other age-related incipient cataract, bilateral 2022    Other age-related incipient cataract of both eyes    Other chronic pain 2022    Chronic pain    Other specified disorders of urethra 2020    Prolapse of urethra    Pain in right knee 2022    Right knee pain    Peripheral vascular disease, unspecified (CMS/[facility]) 2022    Arterial insufficiency of lower extremity    Personal history of other diseases of urinary system 2020    History of prolapse of bladder    Personal history of other endocrine, nutritional and metabolic disease 2014    History of hypothyroidism    Personal history of other specified conditions 2022    History of chronic cough    Sciatica, right side 2019    Right sciatic nerve pain    Sleep apnea, unspecified 04/10/2017    Sleep apnea    Torticollis 2022    Torticollis    Tremor, unspecified 2022    Tremor of both hands    Unspecified cataract     Cataracts, both eyes    Unspecified skin changes 2022    Skin change    Unspecified symptoms and signs involving the genitourinary system 2021    Urinary symptom or sign        Past Surgical History:   Procedure Laterality Date     SECTION, CLASSIC  10/03/2018     Section    GALLBLADDER SURGERY  2020    Gallbladder Surgery    OTHER SURGICAL HISTORY  2021    Shoulder replacement    OTHER SURGICAL HISTORY  2020    Exploratory laparotomy    OTHER SURGICAL HISTORY  2020    Urethroplasty        Family History   Problem Relation  Name Age of Onset    Diabetes Mother      Hypertension Mother      Skin cancer Mother      Other (HEPATIC CIRRHOSIS [Other]) Father      No Known Problems Sister      Other (AMD) Brother      Retinal detachment Daughter      Retinal detachment Son      Cervical cancer Mother's Sister      Cervical cancer Maternal Grandmother          Allergies   Allergen Reactions    Naltrexone Unknown    Narcan [Naloxone] Unknown    Penicillin Unknown        Objective     Vitals:    12/07/23 1118   BP: 110/54   Pulse: 81   Resp: 16        Physical Exam  PHYSICAL EXAM  Vitals signs reviewed  Constitutional:    General: Ambulates with walker, morbidly obese, not in acute distress   Appearance: Normal appearance. Not ill-appearing.  HENT:   Head: Normocephalic and atraumatic  Eyes:   Conjunctiva/sclera normal  Cardiovascular:  No jugular venous distention bilaterally  No gross edema in lower extremities  Pulmonary:   Effort: No respiratory distress  Abdominal:  Abdomen appears nondistended  Musculoskeletal:   Patellar grind test was negative bilaterally  Left and right knees were nonedematous, nonerythematous, nontender to palpation.  Mild crepitus on left  Skin:   General: Skin is warm and dry  Neurological:  Arauz was negative bilaterally  Spurling sign was negative bilaterally  Psychiatric:    Mood and Affect: Mood normal    Behavior: Behavior normal      Assessment/Plan   Problem List Items Addressed This Visit             ICD-10-CM    Cervical spondylosis without myelopathy M47.812    Arthritis of knee - Primary M17.10    Relevant Orders    Nerve Block    Cervical neuritis M54.12            Patient is a 68-year-old female past medical history significant for myalgia, multiple sclerosis, presents as a new patient pain clinic today for multiple complaints.  Worst of pain today she says her bilateral knees.  Plain films from 7/3/2023 reviewed, notable for severe osteoarthritis bilaterally.  GFR 68.  Will plan for bilateral  genicular nerve diagnostic blocks.  Patient is also getting cervical spine MRI this week, asked her to please bring disc/images to clinic next visit so we may review.  Likely has a cervical neuritis or facet mediated neck pain as well.    Plan:  - Schedule for bilateral genicular nerve diagnostic blocks.  We will follow this with radiofrequency ablation of 1 knee then subsequently the other knee 2 to 3 weeks later if she gets relief from the diagnostic blocks.  She has failed physical therapy and medications as well as previous corticosteroid injections.  - Follow-up with OSH C/T/L-spine MRI imaging  - Continue with current pain medication regimen of duloxetine and pregabalin.    I saw and evaluated the patient. I personally obtained the key and critical portions of the history and physical exam or was physically present for key and critical portions performed by the resident/fellow. I reviewed the resident/fellow's documentation and discussed the patient with the resident/fellow. I agree with the resident/fellow's medical decision making as documented in the note.    Alonso Kerr MD

## 2023-12-11 ENCOUNTER — TREATMENT (OUTPATIENT)
Dept: PHYSICAL THERAPY | Facility: HOSPITAL | Age: 68
End: 2023-12-11
Payer: COMMERCIAL

## 2023-12-11 DIAGNOSIS — M17.0 PRIMARY OSTEOARTHRITIS OF BOTH KNEES: Primary | ICD-10-CM

## 2023-12-11 DIAGNOSIS — M17.10 ARTHRITIS OF KNEE: ICD-10-CM

## 2023-12-11 DIAGNOSIS — G35 MULTIPLE SCLEROSIS (MULTI): ICD-10-CM

## 2023-12-11 PROCEDURE — 97110 THERAPEUTIC EXERCISES: CPT | Mod: GP,CQ

## 2023-12-11 ASSESSMENT — PAIN - FUNCTIONAL ASSESSMENT: PAIN_FUNCTIONAL_ASSESSMENT: 0-10

## 2023-12-11 ASSESSMENT — PAIN SCALES - GENERAL: PAINLEVEL_OUTOF10: 6

## 2023-12-11 NOTE — PROGRESS NOTES
"Physical Therapy    Physical Therapy Treatment    Patient Name: Jes Roper  MRN: [id number]  Today's Date: 12/11/2023  Time Calculation  Start Time: 0101  Stop Time: 0145  Time Calculation (min): 44 min        Current Problem  1. Primary osteoarthritis of both knees        2. Arthritis of knee  Follow Up In Physical Therapy      3. Multiple sclerosis (CMS/HCC)              Subjective   Pt reports pain is still the same but states she is walking better at home. Pt reports no recent falls.    Pain  Pain Assessment: 0-10  Pain Score: 6  Pain Type: Chronic pain    Objective     Treatments:  EXERCISES Date 11/16/23 Date 12/4/23 Date: 12/10/2023   VISIT# #3 #4 #5    REPS REPS REPS    Nu step 4' L1 L1 8 min  L2 10 min    Parallel Bars      Rockerboard gastroc stretch   20\" H x5    30sec x3 3 x 30 sec    Foam balance NBOS 30sec x2 NBOS 30sec x2 with no UE support  NBOS 30 sec x 3 with no UE support    Foam marches  1 x 10  2 x 10 with No UE support    RB A/P, M/L  1 x 20 each (A/P, M/L) 1 x 20 each (A/P, M/L)         Seated Marches 20x       LAQ  1 x 20 leah  1.5# 1 x 10 leah    ADD ball squeeze  5 sec x 15  5 sec x 15    Standing hip ABD 10x ea 1 x 10 each     Slow high knee marches  10x ea  1 lap     3-way Kicks    1 x 10 each          6 min walk test  655 ft.                                                                                                                                                     HEP          Assessment:   The supervising therapist was present for the entire session and made all clinical decisions.   Pt tolerated all exercises with minimal difficulty with decrease in pain. Pt tolerated increased resistance on LAQ. Pt demonstrates better balance while performing balance exercises.     Plan:   Continue to progress LE strengthening to increase strength and decrease pain.         Goals:  Active       PT Problem       PT Goals       Start:  11/07/23    Expected End:  12/26/23       In 4-6 weeks pt " will demonstrate:    1) Decreased pain in bilateral knees to 1-2/10 max to allow greater ease with ADL, household chores  2) increased standing and walking tolerance to 45 mins to allow increased mobility in the community and greater ease with household chores.  3) Increased strength to 5/5 bilateral LE to allow greater ease with household tasks, ambulation, and ADL's

## 2023-12-14 ENCOUNTER — TELEPHONE (OUTPATIENT)
Dept: UROLOGY | Facility: CLINIC | Age: 68
End: 2023-12-14
Payer: COMMERCIAL

## 2023-12-14 ENCOUNTER — TREATMENT (OUTPATIENT)
Dept: PHYSICAL THERAPY | Facility: HOSPITAL | Age: 68
End: 2023-12-14
Payer: COMMERCIAL

## 2023-12-14 DIAGNOSIS — M17.10 ARTHRITIS OF KNEE: ICD-10-CM

## 2023-12-14 PROCEDURE — 97113 AQUATIC THERAPY/EXERCISES: CPT | Mod: GP,CQ

## 2023-12-14 ASSESSMENT — PAIN SCALES - GENERAL: PAINLEVEL_OUTOF10: 3

## 2023-12-14 ASSESSMENT — PAIN DESCRIPTION - DESCRIPTORS: DESCRIPTORS: ACHING;SORE

## 2023-12-14 ASSESSMENT — PAIN - FUNCTIONAL ASSESSMENT: PAIN_FUNCTIONAL_ASSESSMENT: 0-10

## 2023-12-14 NOTE — PROGRESS NOTES
"Physical Therapy    Physical Therapy Treatment    Patient Name: Jes Roper  MRN: [id number]  Today's Date: 12/14/2023  Time Calculation  Start Time: 1300  Stop Time: 1345  Time Calculation (min): 45 min  VISIT 8      Assessment:  PT Assessment  Assessment Comment: good shay of aquatic exs pain decreased in [place] Knees 1-2/10    Plan:  OP PT Plan  PT Plan:  (reassess)    Current Problem  1. Arthritis of knee  Follow Up In Physical Therapy    Follow Up In Physical Therapy          Subjective  pt reports feeling a little better today was able amb in community without walker safely       Precautions  Precautions  Precautions Comment: no change     Pain  Pain Assessment: 0-10  Pain Score: 3  Pain Type: Chronic pain  Pain Location: Knee  Pain Orientation:  (L>R Knee)  Pain Descriptors: Aching, Sore  Pain Frequency: Constant/continuous    Objective         Treatments:     Aquatic Exercise  Aquatic Exercise Performed: Yes  Aquatic Exercise Activity 1: ambulation forward, retro, lateral x2 laps  Aquatic Exercise Activity 2: m,arching 2 laps  Aquatic Exercise Activity 3: HR AND TR X15  Aquatic Exercise Activity 4: squats x15  Aquatic Exercise Activity 5: SLR retro, lateral, forward X 15 EA  Aquatic Exercise Activity 6: distraction flexion and Deep water Biking  x5 min EA  Aquatic Exercise Activity 7: hs and gastroc stretches 3x15\" each  Aquatic Exercise Activity 8: step ups X 10 EA      Goals:  Active       PT Problem       PT Goals       Start:  11/07/23    Expected End:  12/26/23       In 4-6 weeks pt will demonstrate:    1) Decreased pain in bilateral knees to 1-2/10 max to allow greater ease with ADL, household chores  2) increased standing and walking tolerance to 45 mins to allow increased mobility in the community and greater ease with household chores.  3) Increased strength to 5/5 bilateral LE to allow greater ease with household tasks, ambulation, and ADL's            "

## 2023-12-14 NOTE — PROGRESS NOTES
"Subjective   Patient ID: Jes Roper is a 68 y.o. female who presents for  pessary maintenance  HPI  68-year-old with #4 incontinence ring pessary with history of genuine stress urinary incontinence having undergone 10/19/2020 excision of urethral polyp and cystoscopy for history of urinary urgency, stress incontinence, vaginal atrophy with urethral polyp, multiple sclerosis, pelvic floor weakness, and constipation with urinary urgency and frequency.       From Previous note  68-year-old with #4 incontinence ring pessary with history of genuine stress urinary incontinence having undergone 10/19/2020 excision of urethral polyp and cystoscopy for history of urinary urgency, stress incontinence, vaginal atrophy with urethral polyp, multiple sclerosis, pelvic floor weakness, and constipation with urinary urgency and frequency.     She is overall very satisfied with her pessary. She denies any abnormal vaginal bleeding or discharge. She notes improvement in her stress urinary incontinence complaints.     She is no longer taking Myrbetriq. \"I have a full plate\". She is presently managing her mother who is 98 and on hospice. She also has had recent increase in her diuretic requirements. She notes daytime frequency associated with her diuretic use. She notes 1-2 episodes of nocturia.     She has no other complaints.     From previous note     67-year-old with #4 incontinence ring pessary with history of genuine stress urinary incontinence having undergone 10/19/2020 excision of urethral polyp and cystoscopy for history of urinary urgency, stress incontinence, vaginal atrophy with urethral polyp, multiple sclerosis, pelvic floor weakness, and constipation with urinary urgency and frequency.     The patient was last seen in December 2022. The patient has not started taking the Myrbetriq as it was cost prohibitive for her. She is switching insurance and will contact again after she is established. She continues to note " "urinary urgency and incontinence complaints. She continues to note rare stress urinary incontinence and states that she \" some days when its under control and some days she cannot move without leaking\". She denies any UTI like symptoms. She is satisfied from the pessary standpoint.      She denies any bowel related complaints, no fecal or flatal incontinence.     She denies any vaginal complaints, no abnormal vaginal bleeding or discharge. She is utilizing the vaginal estrogen cream.      She has no other complaints.   From previous note  67-year-old with #4 incontinence ring pessary with history of genuine stress urinary incontinence having undergone 10/19/2020 excision of urethral polyp and cystoscopy for history of urinary urgency, stress incontinence, vaginal atrophy with urethral polyp, multiple sclerosis, pelvic floor weakness, and constipation with urinary urgency and frequency presenting today for pessary maintenance.     The patient state she continues to note rare stress urinary incontinence. She is overall satisfied with her incontinence ring. She denies any vaginal complaints. She denies any UTI-like symptoms. He does however note episodic urinary urgency, frequency, and incontinence. \"It comes in waves\". She wishes to proceed with Myrbetriq in 2023.     She denies any bowel related complaints, no fecal or flatal incontinence.     She denies any vaginal complaints, no abnormal vaginal bleeding or discharge.     She has no other complaints.      From previous note   67-year-old with history of MS with mixed urinary incontinence and #4 incontinence ring pessary having undergone 10/19/2020 excision of urethral polyp and cystoscopy for history of urethral polyp presenting for pessary follow-up.     She is overall very satisfied with her pessary ring. She denies any UTI-like symptoms. She feels that she is emptying her bladder appropriately. However she has noted an episode of vaginal spotting over the last " "week. She otherwise denies any abnormal vaginal discharge.     At her last appointment, we discussed her concerns for several episodes of urinary incontinence. However it was unclear whether this was urine or vaginal discharge. She was instructed to proceed with a phenazopyridine challenge. She has been unable to perform this. She does not have any particular lower urinary tract complaints at this time. .      She has a \"very full plate\" as her 97-year-old mother recently moved in. She has not had the opportunity to \"really think about\" her lower urinary tract symptoms.     She has no other complaints.     From previous note  66-year-old presenting for incontinence ring pessary follow-up after 10/19/2020 excision of urethral polyp and cystoscopy for history of urethral polyp, urinary urgency, female stress incontinence, and vaginal atrophy with history of MS.     The patient was refitted with a #4 incontinence ring at her last appointment. She is overall very satisfied with this. She feels that this has significantly improved her urinary leakage as well as her urgency complaints. She wishes to continue this therapy moving forward.     She denies any UTI-like symptoms.     She has picked up her vaginal estrogen therapy but has not begun this.     She has no other complaints.     From previous note  65-year-old with urethral polyp, urinary urgency, and vaginal atrophy.     The patient continues to utilize her vaginal estrogen therapy. She has not noted any improvement in her urethral polyp complaints. She denies any gross hematuria. She denies any significant changes in her urinary urgency.     She wishes to proceed with definitive surgical management.     She has no other complaints.     From previous note  65-year-old presenting as a referral from Dr. Carvalho with complaints of a urethral mass, gross hematuria, and urinary urgency.     The patient was originally evaluated in March with complaints of vaginal bleeding. " She was noted to have urethral prolapse and vaginal atrophy and started on vaginal estrogen cream. Upon reevaluation her prolapse had improved but upon evaluation by Dr. Carvalho was found to have a urethral polyp. Cystoscopy at that time was normal and upper tract imaging demonstrated nonobstructive bilateral nephrolithiasis.     The patient continues to note episodic spotting when wiping. She does note urinary urgency and frequency but denies any urge related incontinence. She does note rare stress urinary incontinence. She notes nocturia up to 3 times. She denies enuresis. She denies a history of chronic urinary tract infections.     She is not sexually active. She does suffer from MS. She denies any vaginal bulge complaints. She denies any abnormal vaginal discharge.     She has episodic diarrhea and constipation. She denies any fecal or flatal incontinence.     She has no other complaints.   Review of Systems  Constitutional: No fever, No chills and No fatigue.   Eyes: No vision problems and No dryness of the eyes.   ENT: No dry mouth, No hearing loss and No nosebleeds.   Cardiovascular: No chest pain, No palpitations and No orthopnea.   Respiratory: No shortness of breath, No cough and No wheezing.   Gastrointestinal: No abdominal pain, No constipation, No nausea, No diarrhea, No vomiting and No melena.   Genitourinary: As noted in HPI.   Musculoskeletal: No back pain, No myalgias, No muscle weakness, No joint swelling and No leg edema.   Integumentary: No rashes, No skin lesion and No itching.   Neurological: No headache, No numbness and No dizziness.   Psychiatric: No sleep disturbances, No anxiety and No depression.   Endocrine: No hot flashes, No loss of hair and No hirsutism.   Hematologic/Lymphatic: No swollen glands, No tendency for easy bleeding and No tendency for easy bruising.   All other systems have been reviewed and are negative for complaint.        Objective   Physical Exam      PHYSICAL  EXAMINATION:  No LMP recorded.  There is no height or weight on file to calculate BMI.  There were no vitals taken for this visit.  General Appearance: well appearing  Neuro: Alert and oriented   HEENT: mucous membranes moist, neck supple  Resp: No respiratory distress, normal work of breathing  MSK: normal range of motion, gait appropriate    Patient ID: Jes Roper is a 68 y.o. female.    Procedures      FPMRS Procedure:  Pessary check.   Findings include: atrophy,~excoriations~and~Small area of excoriation was treated with silver nitrate at the vaginal apex., but~No cystocele,~No rectocele~and~No tenderness.   Pessary Placement: JES was fitted with 4. Incontinence ring pessary.~The pessary was removed without complication.~The pessary was replaced without complication.   Assessment/Plan   68-year-old with #4 incontinence ring pessary with history of genuine stress urinary incontinence having undergone 10/19/2020 excision of urethral polyp and cystoscopy for history of urinary urgency, stress incontinence, vaginal atrophy with urethral polyp, multiple sclerosis, pelvic floor weakness, and constipation with urinary urgency and frequency.     1. Patient appears to be having good results with her #4 incontinence ring. We have previously discussed the patient's urodynamics. We specifically discussed therapy for her stress urinary incontinence. We have previously discussed pelvic floor physical therapy, pessary management, and definitive management with a mid urethral sling or Bulkamid therapy. She is overall very satisfied with her incontinence ring.we again discussed urodynamics findings noting terminal contraction noted at 300 cc. We again discussed the importance of timed voiding and fluid management strategies. She had no benefits with oxybutynin therapy in the past. While she has previously had moderate improvements with her 50 mg of Myrbetriq, this has become cost prohibitive to her and she has since  stopped this medication. She was given the name of this medication as well as Gemtesa and she will contact her insurance company to see how and if this is covered. We also discussed the possibility of tier exemption and prior authorization for these medications. We did again discuss at length today PTNS, Botox, and InterStim including the various risks of these procedures. The patient was previously provided information on all 3 of these procedures. She does not desire any changes in her therapy at this time.     2. We discussed the patient's constipation. We discussed titrating daily MiraLAX therapy. We discussed the importance of daily fiber therapy. She will continue this moving forward.     #3 she will continue her vaginal estrogen therapy 3 times weekly.     4. The patient will follow up in December 2023 for pessary maintenance. She will contact the clinic should she wish to proceed with beta 3 agonist therapy if this is covered by her insurance.     ANTWON Poep MD      Scribe Attestation  By signing my name below, IChristy Scribe attest that this documentation has been prepared under the direction and in the presence of Ajay Pope MD. All medical record entries made by the Scribe were at my direction or personally dictated by me. I have reviewed the chart and agree that the record accurately reflects my personal performance of the history, physical exam, discussion and plan.

## 2023-12-19 ENCOUNTER — APPOINTMENT (OUTPATIENT)
Dept: UROLOGY | Facility: CLINIC | Age: 68
End: 2023-12-19
Payer: COMMERCIAL

## 2023-12-20 ENCOUNTER — TREATMENT (OUTPATIENT)
Dept: PHYSICAL THERAPY | Facility: HOSPITAL | Age: 68
End: 2023-12-20
Payer: COMMERCIAL

## 2023-12-20 DIAGNOSIS — M17.10 ARTHRITIS OF KNEE: ICD-10-CM

## 2023-12-20 PROCEDURE — 97113 AQUATIC THERAPY/EXERCISES: CPT | Mod: GP,CQ | Performed by: PHYSICAL THERAPY ASSISTANT

## 2023-12-20 ASSESSMENT — PAIN - FUNCTIONAL ASSESSMENT: PAIN_FUNCTIONAL_ASSESSMENT: 0-10

## 2023-12-20 ASSESSMENT — PAIN SCALES - GENERAL: PAINLEVEL_OUTOF10: 4

## 2023-12-20 NOTE — PROGRESS NOTES
"Physical Therapy    Physical Therapy    Physical Therapy Treatment    Patient Name: Jes Roper  MRN: [id number]  Today's Date: 12/20/2023  Time Calculation  Start Time: 1050  Stop Time: 1135  Time Calculation (min): 45 min  Visit #9    Assessment:  PT Assessment  Assessment Comment: demonstrated good tolerance of aquatic therapy without c/o. She is motivated to perform therapy and will benefit from further skilled PT services.  Plan:  OP PT Plan  PT Plan: Skilled PT (Continue with POC modify and advance program as needed.)    Current Problem  1. Arthritis of knee  Follow Up In Physical Therapy                  Subjective  Pt stated she knows therapy is helping her. She feels better.   Precautions  Precautions  STEADI Fall Risk Score (The score of 4 or more indicates an increased risk of falling): 10  Precautions Comment: no change (pt had fall since last visist on 12/14/23)       Pain  Pain Assessment  Pain Assessment: 0-10  Pain Score: 4 (L knee is 4 R knee is 2)  Pain Type: Chronic pain  Pain Location: Knee  Pain Orientation: Left, Right    Objective Education on technique. Good tolerance.          Treatments:  Aquatic Exercise  Aquatic Exercise Performed: Yes  Aquatic Exercise Activity 1: ambulation forward, retro, lateral x2 laps  Aquatic Exercise Activity 2: m,arching 2 laps  Aquatic Exercise Activity 3: HR AND TR X15  Aquatic Exercise Activity 4: squats x15  Aquatic Exercise Activity 5: SLR retro, lateral, forward X 15 EA  Aquatic Exercise Activity 6: distraction flexion and Deep water Biking  x5 min EA  Aquatic Exercise Activity 7: hs and gastroc stretches 3x15\" each  Aquatic Exercise Activity 8: step ups X 10 EA           Goals:  Active       PT Problem       PT Goals       Start:  11/07/23    Expected End:  12/26/23       In 4-6 weeks pt will demonstrate:    1) Decreased pain in bilateral knees to 1-2/10 max to allow greater ease with ADL, household chores  2) increased standing and walking tolerance " to 45 mins to allow increased mobility in the community and greater ease with household chores.  3) Increased strength to 5/5 bilateral LE to allow greater ease with household tasks, ambulation, and ADL's

## 2023-12-21 ENCOUNTER — OFFICE VISIT (OUTPATIENT)
Dept: CARDIOLOGY | Facility: CLINIC | Age: 68
End: 2023-12-21
Payer: COMMERCIAL

## 2023-12-21 VITALS
OXYGEN SATURATION: 97 % | DIASTOLIC BLOOD PRESSURE: 60 MMHG | HEART RATE: 66 BPM | WEIGHT: 182.7 LBS | SYSTOLIC BLOOD PRESSURE: 94 MMHG | HEIGHT: 60 IN | BODY MASS INDEX: 35.87 KG/M2

## 2023-12-21 DIAGNOSIS — R06.09 DYSPNEA ON EXERTION: ICD-10-CM

## 2023-12-21 DIAGNOSIS — I50.22 CHRONIC SYSTOLIC HEART FAILURE (MULTI): Primary | ICD-10-CM

## 2023-12-21 DIAGNOSIS — I10 ESSENTIAL HYPERTENSION, BENIGN: ICD-10-CM

## 2023-12-21 PROCEDURE — 1160F RVW MEDS BY RX/DR IN RCRD: CPT | Performed by: INTERNAL MEDICINE

## 2023-12-21 PROCEDURE — 1125F AMNT PAIN NOTED PAIN PRSNT: CPT | Performed by: INTERNAL MEDICINE

## 2023-12-21 PROCEDURE — 93010 ELECTROCARDIOGRAM REPORT: CPT | Performed by: INTERNAL MEDICINE

## 2023-12-21 PROCEDURE — 4010F ACE/ARB THERAPY RXD/TAKEN: CPT | Performed by: INTERNAL MEDICINE

## 2023-12-21 PROCEDURE — 1159F MED LIST DOCD IN RCRD: CPT | Performed by: INTERNAL MEDICINE

## 2023-12-21 PROCEDURE — 3078F DIAST BP <80 MM HG: CPT | Performed by: INTERNAL MEDICINE

## 2023-12-21 PROCEDURE — 1036F TOBACCO NON-USER: CPT | Performed by: INTERNAL MEDICINE

## 2023-12-21 PROCEDURE — 99214 OFFICE O/P EST MOD 30 MIN: CPT | Performed by: INTERNAL MEDICINE

## 2023-12-21 PROCEDURE — 99214 OFFICE O/P EST MOD 30 MIN: CPT | Mod: 25 | Performed by: INTERNAL MEDICINE

## 2023-12-21 PROCEDURE — 93005 ELECTROCARDIOGRAM TRACING: CPT | Performed by: INTERNAL MEDICINE

## 2023-12-21 PROCEDURE — 3074F SYST BP LT 130 MM HG: CPT | Performed by: INTERNAL MEDICINE

## 2023-12-21 RX ORDER — METHYLPREDNISOLONE 4 MG/1
TABLET ORAL EVERY 24 HOURS
COMMUNITY
Start: 2023-11-20 | End: 2023-12-21 | Stop reason: ALTCHOICE

## 2023-12-21 RX ORDER — DAPAGLIFLOZIN 10 MG/1
10 TABLET, FILM COATED ORAL DAILY
Qty: 90 TABLET | Refills: 3 | Status: SHIPPED | OUTPATIENT
Start: 2023-12-21

## 2023-12-21 RX ORDER — BACLOFEN 10 MG/1
5 TABLET ORAL NIGHTLY
COMMUNITY
Start: 2023-10-30

## 2023-12-21 ASSESSMENT — ENCOUNTER SYMPTOMS
LOSS OF SENSATION IN FEET: 0
DEPRESSION: 0
OCCASIONAL FEELINGS OF UNSTEADINESS: 1

## 2023-12-21 ASSESSMENT — PATIENT HEALTH QUESTIONNAIRE - PHQ9
1. LITTLE INTEREST OR PLEASURE IN DOING THINGS: NOT AT ALL
SUM OF ALL RESPONSES TO PHQ9 QUESTIONS 1 AND 2: 0
2. FEELING DOWN, DEPRESSED OR HOPELESS: NOT AT ALL

## 2023-12-21 ASSESSMENT — PAIN SCALES - GENERAL: PAINLEVEL: 4

## 2023-12-21 NOTE — PROGRESS NOTES
"Subjective   Jes Roper is a 68 y.o. female who presents to the [facility]  for follow up of chronic systolic heart failure and chronic exertional dyspnea. Last seen in June 2023.     Since our last visit, Ms. Roper has stable NYHA class II symptoms with exertional dyspnea with climbing 1 flight of stairs, walking 3-5 minutes at a time (up from walking 1-2 aisle at the grocery store prior to starting Farxiga 10 mg a day in July 2020).     Her leg edema is improved compared to February 2022 when we changed diuretic to torsemide 20 mg a day. Now trace KAT.     Had mechanical fall on 10/17/2023 \"my legs gave out\" and fell on her left shoulder (no fractures). Took steroids to evaluate if MS related. Now in PT. Decreased mobility since that episode. No syncope. Has fibromyalgia and MS with chronic pains in the legs and shoulders most prominently R sciatica pain. These are variable day-to-day but on average worse than 4 months ago.     No chest pain, PND, orthopnea, KAT, palpitations, syncope, or claudication. Has episodes of fingers/toes turning blue (no formal diagnosis of Raynaud's phenomenon).      Repeat TTE in August 2019 showed stable LV EF 40%.     Dyspnea work up:  1. 9/4/2018 TTE: LV EF ~40%, no LVH (LVMI 80 gm//m2), impaired relaxation diastology (E/e' 8), normal LA size, normal RV/RA, trivial AI, trace MR, trace TR, unable to estimate RVSP.  2. 4/6/2017 PFTs: FEV1 2.11 L (93% pred); FVC 2.63 L (89% pred), FEV1/FVC ratio 80% (103% pred), DLCO 13.46 (66% pred), DLCO/VA 3.87 (85%) diffusion capacity mildly reduced; TLC 4.69 L (106% pred), RV/TLC ratio 45% (normal), RV 2.13 L (121% pred).     Past Medical History:  1. Chronic systolic heart failure: 9/2018 LV EF 45%  2. Multiple sclerosis  3. Fibromyalgia  4. Hypertension  5. GHULAM on CPAP     Social History:  Non-smoker     Family History:  No premature CAD in 1st degree relatives ( 55 years of age for male relatives, 65 years " "of age for female relatives)     Review of Systems    A 14 point review of systems was asked. All questions were negative except for pertinent positives listed in the HPI.      Objective   Physical Exam  BP Readings from Last 3 Encounters:   23 110/54   23 122/68   23 123/59      Wt Readings from Last 3 Encounters:   23 82.9 kg (182 lb 11.2 oz)   23 81.9 kg (180 lb 8 oz)   23 79.4 kg (175 lb)      BMI: Estimated body mass index is 35.68 kg/m² as calculated from the following:    Height as of this encounter: 1.524 m (5').    Weight as of this encounter: 82.9 kg (182 lb 11.2 oz).  BSA: Estimated body surface area is 1.87 meters squared as calculated from the following:    Height as of this encounter: 1.524 m (5').    Weight as of this encounter: 82.9 kg (182 lb 11.2 oz).    General: no acute distress  HEENT: EOMI, no scleral icterus.  Lungs: Clear to auscultation bilaterally without wheezing, rales, or rhonchi.  Cardiovascular: Regular rhythm and rate. Normal S1 and S2. No murmurs, rubs, or gallops are appreciated. JVP normal.  Abdomen: Soft, nontender, nondistended. Bowel sounds present.  Extremities: Warm and well perfused with equal 2+ pulses bilaterally.  No edema present.  Neurologic: Alert and oriented x3.    I have personally reviewed the following images and laboratory findings:  Last echocardiogram:   Most recent echo, 2019: LV EF ~40%, no LVH (LVMI 70 gm/m2), impaired relaxation diastology (E/e' 11.8), normal LA size (LYNN 27 ml/m2), normal RV/RA, trivial AI, trace MR/TR, unable to estimate RVSP.     Last cath / stress test:    Most recent EC2023 EC2022 ECG: Sinus rhythm, 81 bpm, Normal ECG. Personally reviewed in office.     Lab Results   Component Value Date    CHOL 235 (H) 2022     Lab Results   Component Value Date    HDL 53.6 2022     No results found for: \"LDLCALC\"  Lab Results   Component Value Date    TRIG 143 2022 " "    No components found for: \"CHOLHDL\"   4/22/2023      Assessment/Plan   1. Chronic dyspnea:  Multi-factorial dyspnea due to the following causes: 1) Cardiomyopathy / chronic systolic heart failure 2) hypertension 3) Fibromyalgia / MS 4) Skeletal muscle deconditioning     - repeat August 2019 echocardiogram shows stable LV EF of 40%. She has stable NYHA class II symptoms.  - continue PT program exercises. Walking for a few minutes at a time for skeletal muscle conditioning as tolerated by MS fatigue symptomatology. Can also use aquatherapy for physical conditioning program that can reduce impact on joints during exercise. LV EF is > 35% so patient does not qualify for cardiac rehab exercise program under a heart failure indication.      2. Chronic systolic heart failure:  Stable NYHA class II symptoms. LV EF 40% on 8/2019 echocardiogram She is on optimal medical therapy for neurohormonal remodeling with metoprolol  mg a day, losartan 100 mg a day, Farxiga 10 mg a day, and spironolactone 25 mg a day for neurohormonal remodeling. Continue torsemide 10-20 mg a day.     Continue Farxiga 10 mg a day. This medication has provided improvement in severe dyspnea and renal function on recent blood work shows improved estimated GFR. She meets the criteria of enrolled patients for the DAPA-HF trial of Farxiga (dapagliflozin) 10 mg a day for prevention of worsening heart failure or cardiovascular death. I renewed her paper work for the [employer] patient assistance program today for another 12 months. Initial application was approved in February 2023.      3. Hypertension:  Blood pressure at goal on current medications.     Follow up with Dr. Mead in 6 months.            SIGNATURE: Aguila Mead MD PATIENT NAME: Jes Roper   DATE/TIME: December 21, 2023 1:47 PM MRN: [id number]     "

## 2023-12-21 NOTE — PATIENT INSTRUCTIONS
You were seen in the [facility] for your chronic systolic heart failure and shortness of breath (the medical term is called dyspnea).     Your shortness of breath stable compared to 6 months ago and is likely due to a combination of your heart failure (chronic systolic heart failure), and skeletal muscle deconditioning from your MS and fibromyalgia.      Your repeat August 2019 echocardiogram showed that your heart muscle squeezing strength (LV ejection fraction) was stable at 40%.      You are on good heart failure medical therapy with metoprolol  mg, losartan 100 mg a day, Farxiga 10 mg a day, and spironolactone 25 mg a day.     Continue to take torsemide 10-20 mg a day. You can take a 1/2 tablet (10 mg) now in the summer to see if this helps your blood pressure stay in normal range. Weight yourself every day, if you gain 2 pounds overnight, take a full 20 mg tablet that morning.    I completed your AZ&Me packet to submit with a faxed prescription today while you were in the office with me.     Follow up with Dr. Mead in 6 months.

## 2023-12-22 ENCOUNTER — OFFICE VISIT (OUTPATIENT)
Dept: UROLOGY | Facility: CLINIC | Age: 68
End: 2023-12-22
Payer: COMMERCIAL

## 2023-12-22 VITALS
WEIGHT: 184.2 LBS | SYSTOLIC BLOOD PRESSURE: 99 MMHG | DIASTOLIC BLOOD PRESSURE: 65 MMHG | BODY MASS INDEX: 35.97 KG/M2 | HEART RATE: 76 BPM

## 2023-12-22 DIAGNOSIS — Z96.0 PRESENCE OF PESSARY: ICD-10-CM

## 2023-12-22 DIAGNOSIS — G35 MULTIPLE SCLEROSIS (MULTI): ICD-10-CM

## 2023-12-22 DIAGNOSIS — N95.2 VAGINAL ATROPHY: ICD-10-CM

## 2023-12-22 DIAGNOSIS — Z46.89 PESSARY MAINTENANCE: ICD-10-CM

## 2023-12-22 DIAGNOSIS — N39.3 FEMALE STRESS INCONTINENCE: Primary | ICD-10-CM

## 2023-12-22 DIAGNOSIS — R31.21 ASYMPTOMATIC MICROSCOPIC HEMATURIA: ICD-10-CM

## 2023-12-22 LAB
POC APPEARANCE, URINE: ABNORMAL
POC BILIRUBIN, URINE: NEGATIVE
POC BLOOD, URINE: ABNORMAL
POC COLOR, URINE: YELLOW
POC GLUCOSE, URINE: NEGATIVE MG/DL
POC KETONES, URINE: ABNORMAL MG/DL
POC LEUKOCYTES, URINE: ABNORMAL
POC NITRITE,URINE: NEGATIVE
POC PH, URINE: 5.5 PH
POC PROTEIN, URINE: ABNORMAL MG/DL
POC SPECIFIC GRAVITY, URINE: 1.02
POC UROBILINOGEN, URINE: 0.2 EU/DL

## 2023-12-22 PROCEDURE — 99214 OFFICE O/P EST MOD 30 MIN: CPT | Performed by: OBSTETRICS & GYNECOLOGY

## 2023-12-22 PROCEDURE — 3078F DIAST BP <80 MM HG: CPT | Performed by: OBSTETRICS & GYNECOLOGY

## 2023-12-22 PROCEDURE — 81003 URINALYSIS AUTO W/O SCOPE: CPT | Performed by: OBSTETRICS & GYNECOLOGY

## 2023-12-22 PROCEDURE — 4010F ACE/ARB THERAPY RXD/TAKEN: CPT | Performed by: OBSTETRICS & GYNECOLOGY

## 2023-12-22 PROCEDURE — 1159F MED LIST DOCD IN RCRD: CPT | Performed by: OBSTETRICS & GYNECOLOGY

## 2023-12-22 PROCEDURE — 1036F TOBACCO NON-USER: CPT | Performed by: OBSTETRICS & GYNECOLOGY

## 2023-12-22 PROCEDURE — 1125F AMNT PAIN NOTED PAIN PRSNT: CPT | Performed by: OBSTETRICS & GYNECOLOGY

## 2023-12-22 PROCEDURE — 1160F RVW MEDS BY RX/DR IN RCRD: CPT | Performed by: OBSTETRICS & GYNECOLOGY

## 2023-12-22 PROCEDURE — 3074F SYST BP LT 130 MM HG: CPT | Performed by: OBSTETRICS & GYNECOLOGY

## 2023-12-22 RX ORDER — ESTRADIOL 0.1 MG/G
CREAM VAGINAL
Qty: 42.5 G | Refills: 3 | Status: SHIPPED | OUTPATIENT
Start: 2023-12-22

## 2023-12-22 NOTE — PROGRESS NOTES
"Subjective   Patient ID: Jes Roper is a 68 y.o. female who presents for  pessary maintenance  HPI  68-year-old with #4 incontinence ring pessary with history of genuine stress urinary incontinence having undergone 10/19/2020 excision of urethral polyp and cystoscopy for history of urinary urgency, stress incontinence, vaginal atrophy with urethral polyp, multiple sclerosis, pelvic floor weakness, and constipation with urinary urgency and frequency.     The patient required to have an MRI and was unable to proceed due to the pessary. She presents to get the pessary removed to be able to have the MRI.     She has a fall 10/17 and is noting worsening MS symptoms since then.     She notes worsening urinary urgency and frequency when she takes her diuretics. She notes daytime frequency associated with her diuretic use. She notes 1-2 episodes of nocturia.    She denies any vaginal complaints, no abnormal bleeding or discharge.     She denies any bowel related complaints, no fecal or flatal incontinence.    She has no other complaints.    From Previous note  68-year-old with #4 incontinence ring pessary with history of genuine stress urinary incontinence having undergone 10/19/2020 excision of urethral polyp and cystoscopy for history of urinary urgency, stress incontinence, vaginal atrophy with urethral polyp, multiple sclerosis, pelvic floor weakness, and constipation with urinary urgency and frequency.     She is overall very satisfied with her pessary. She denies any abnormal vaginal bleeding or discharge. She notes improvement in her stress urinary incontinence complaints.     She is no longer taking Myrbetriq. \"I have a full plate\". She is presently managing her mother who is 98 and on hospice. She also has had recent increase in her diuretic requirements. She notes daytime frequency associated with her diuretic use. She notes 1-2 episodes of nocturia.     She has no other complaints.     From previous " "note     67-year-old with #4 incontinence ring pessary with history of genuine stress urinary incontinence having undergone 10/19/2020 excision of urethral polyp and cystoscopy for history of urinary urgency, stress incontinence, vaginal atrophy with urethral polyp, multiple sclerosis, pelvic floor weakness, and constipation with urinary urgency and frequency.     The patient was last seen in December 2022. The patient has not started taking the Myrbetriq as it was cost prohibitive for her. She is switching insurance and will contact again after she is established. She continues to note urinary urgency and incontinence complaints. She continues to note rare stress urinary incontinence and states that she \" some days when its under control and some days she cannot move without leaking\". She denies any UTI like symptoms. She is satisfied from the pessary standpoint.      She denies any bowel related complaints, no fecal or flatal incontinence.     She denies any vaginal complaints, no abnormal vaginal bleeding or discharge. She is utilizing the vaginal estrogen cream.      She has no other complaints.   From previous note  67-year-old with #4 incontinence ring pessary with history of genuine stress urinary incontinence having undergone 10/19/2020 excision of urethral polyp and cystoscopy for history of urinary urgency, stress incontinence, vaginal atrophy with urethral polyp, multiple sclerosis, pelvic floor weakness, and constipation with urinary urgency and frequency presenting today for pessary maintenance.     The patient state she continues to note rare stress urinary incontinence. She is overall satisfied with her incontinence ring. She denies any vaginal complaints. She denies any UTI-like symptoms. He does however note episodic urinary urgency, frequency, and incontinence. \"It comes in waves\". She wishes to proceed with Myrbetriq in 2023.     She denies any bowel related complaints, no fecal or flatal " "incontinence.     She denies any vaginal complaints, no abnormal vaginal bleeding or discharge.     She has no other complaints.      From previous note   67-year-old with history of MS with mixed urinary incontinence and #4 incontinence ring pessary having undergone 10/19/2020 excision of urethral polyp and cystoscopy for history of urethral polyp presenting for pessary follow-up.     She is overall very satisfied with her pessary ring. She denies any UTI-like symptoms. She feels that she is emptying her bladder appropriately. However she has noted an episode of vaginal spotting over the last week. She otherwise denies any abnormal vaginal discharge.     At her last appointment, we discussed her concerns for several episodes of urinary incontinence. However it was unclear whether this was urine or vaginal discharge. She was instructed to proceed with a phenazopyridine challenge. She has been unable to perform this. She does not have any particular lower urinary tract complaints at this time. .      She has a \"very full plate\" as her 97-year-old mother recently moved in. She has not had the opportunity to \"really think about\" her lower urinary tract symptoms.     She has no other complaints.     From previous note  66-year-old presenting for incontinence ring pessary follow-up after 10/19/2020 excision of urethral polyp and cystoscopy for history of urethral polyp, urinary urgency, female stress incontinence, and vaginal atrophy with history of MS.     The patient was refitted with a #4 incontinence ring at her last appointment. She is overall very satisfied with this. She feels that this has significantly improved her urinary leakage as well as her urgency complaints. She wishes to continue this therapy moving forward.     She denies any UTI-like symptoms.     She has picked up her vaginal estrogen therapy but has not begun this.     She has no other complaints.     From previous note  65-year-old with urethral " polyp, urinary urgency, and vaginal atrophy.     The patient continues to utilize her vaginal estrogen therapy. She has not noted any improvement in her urethral polyp complaints. She denies any gross hematuria. She denies any significant changes in her urinary urgency.     She wishes to proceed with definitive surgical management.     She has no other complaints.     From previous note  65-year-old presenting as a referral from Dr. Carvalho with complaints of a urethral mass, gross hematuria, and urinary urgency.     The patient was originally evaluated in March with complaints of vaginal bleeding. She was noted to have urethral prolapse and vaginal atrophy and started on vaginal estrogen cream. Upon reevaluation her prolapse had improved but upon evaluation by Dr. Carvalho was found to have a urethral polyp. Cystoscopy at that time was normal and upper tract imaging demonstrated nonobstructive bilateral nephrolithiasis.     The patient continues to note episodic spotting when wiping. She does note urinary urgency and frequency but denies any urge related incontinence. She does note rare stress urinary incontinence. She notes nocturia up to 3 times. She denies enuresis. She denies a history of chronic urinary tract infections.     She is not sexually active. She does suffer from MS. She denies any vaginal bulge complaints. She denies any abnormal vaginal discharge.     She has episodic diarrhea and constipation. She denies any fecal or flatal incontinence.     She has no other complaints.   Review of Systems  Constitutional: No fever, No chills and No fatigue.   Eyes: No vision problems and No dryness of the eyes.   ENT: No dry mouth, No hearing loss and No nosebleeds.   Cardiovascular: No chest pain, No palpitations and No orthopnea.   Respiratory: No shortness of breath, No cough and No wheezing.   Gastrointestinal: No abdominal pain, No constipation, No nausea, No diarrhea, No vomiting and No melena.    Genitourinary: As noted in HPI.   Musculoskeletal: No back pain, No myalgias, No muscle weakness, No joint swelling and No leg edema.   Integumentary: No rashes, No skin lesion and No itching.   Neurological: No headache, No numbness and No dizziness.   Psychiatric: No sleep disturbances, No anxiety and No depression.   Endocrine: No hot flashes, No loss of hair and No hirsutism.   Hematologic/Lymphatic: No swollen glands, No tendency for easy bleeding and No tendency for easy bruising.   All other systems have been reviewed and are negative for complaint.        Objective   Physical Exam      PHYSICAL EXAMINATION:  No LMP recorded.  There is no height or weight on file to calculate BMI.  There were no vitals taken for this visit.  General Appearance: well appearing  Neuro: Alert and oriented   HEENT: mucous membranes moist, neck supple  Resp: No respiratory distress, normal work of breathing  MSK: normal range of motion, gait appropriate    Patient ID: Jes Roper is a 68 y.o. female.    Procedures      [facility]:  [facility].   The patient's #4 incontinence ring pessary was removed without difficulty today    Assessment/Plan   68-year-old with #4 incontinence ring pessary with history of genuine stress urinary incontinence having undergone 10/19/2020 excision of urethral polyp and cystoscopy for history of urinary urgency, stress incontinence, vaginal atrophy with urethral polyp, multiple sclerosis, pelvic floor weakness, and constipation with urinary urgency and frequency.     1.  The patient's incontinence ring was removed today 12/22/2023.  Patient appears to be having good results with her #4 incontinence ring. We have previously discussed the patient's urodynamics. We specifically discussed therapy for her stress urinary incontinence. We have previously discussed pelvic floor physical therapy, pessary management, and definitive management with a mid urethral sling or Bulkamid therapy. She  is overall very satisfied with her incontinence ring.we again discussed urodynamics findings noting terminal contraction noted at 300 cc. We again discussed the importance of timed voiding and fluid management strategies. She had no benefits with oxybutynin therapy in the past. While she has previously had moderate improvements with her 50 mg of Myrbetriq, this has become cost prohibitive to her and she has since stopped this medication. She was given the name of this medication as well as Gemtesa and she will contact her insurance company to see how and if this is covered.  Her daughter is a pharmacist and had asked about Vesicare and we discussed that this was a medication similar to the oxybutynin she had previously used with minimal benefits. We did again discuss at length today PTNS, Botox, and InterStim including the various risks of these procedures. The patient was previously provided information on all 3 of these procedures.  The patient will return to clinic 1/8/2024 for pessary refitting and to discuss medication versus third line therapeutic options for her incontinence complaints.     2. We discussed the patient's constipation. We discussed titrating daily MiraLAX therapy. We discussed the importance of daily fiber therapy. She will continue this moving forward.     #3 she will continue her vaginal estrogen therapy 3 times weekly.     4. The patient will proceed with her MRI as scheduled 1/5/2024.  She will follow-up 1/8/2024 for pessary refitting and to discuss her lower urinary tract symptoms.     ANTWON Pope MD      Scribe Attestation  By signing my name below, IChristy Scribe attest that this documentation has been prepared under the direction and in the presence of Ajay Pope MD. All medical record entries made by the Scribe were at my direction or personally dictated by me. I have reviewed the chart and agree that the record accurately reflects my personal performance of  the history, physical exam, discussion and plan.

## 2023-12-22 NOTE — PATIENT INSTRUCTIONS
Please follow-up at the [facility] office on 1/8/2024 for pessary placement.    Please follow-up with your insurance company regarding the cost of Myrbetriq or Gemtesa therapy.    Please contact the clinic with any questions or concerns at [phone]

## 2023-12-26 ENCOUNTER — TREATMENT (OUTPATIENT)
Dept: PHYSICAL THERAPY | Facility: HOSPITAL | Age: 68
End: 2023-12-26
Payer: COMMERCIAL

## 2023-12-26 DIAGNOSIS — M17.10 ARTHRITIS OF KNEE: ICD-10-CM

## 2023-12-26 PROCEDURE — 97113 AQUATIC THERAPY/EXERCISES: CPT | Mod: GP,CQ

## 2023-12-26 ASSESSMENT — PAIN DESCRIPTION - DESCRIPTORS: DESCRIPTORS: ACHING

## 2023-12-26 ASSESSMENT — PAIN SCALES - GENERAL: PAINLEVEL_OUTOF10: 4

## 2023-12-26 ASSESSMENT — PAIN - FUNCTIONAL ASSESSMENT: PAIN_FUNCTIONAL_ASSESSMENT: 0-10

## 2023-12-26 NOTE — PROGRESS NOTES
"Physical Therapy    Physical Therapy Treatment    Patient Name: Jes Roper  MRN: [id number]  Today's Date: 12/26/2023  Time Calculation  Start Time: 1030  Stop Time: 1115  Time Calculation (min): 45 min  VISIT 10      Assessment:  PT Assessment  Assessment Comment: pt reports increased fatigue in [place] LEs after increases in pool, no increased pain    Plan:  OP PT Plan  PT Plan:  (progress for increased strength add step downs if able next visit)    Current Problem  1. Arthritis of knee  Follow Up In Physical Therapy          Subjective  pt reports not feeling too bad today 4/10       Precautions  Precautions  Precautions Comment: no change     Pain  Pain Assessment: 0-10  Pain Score: 4  Pain Type: Chronic pain  Pain Location: Knee  Pain Orientation: Left, Right  Pain Descriptors: Aching  Pain Frequency: Constant/continuous    Objective increased reps        Treatments:     Aquatic Exercise  Aquatic Exercise Performed: Yes  Aquatic Exercise Activity 1: ambulation forward, retro, lateral x2 laps  Aquatic Exercise Activity 2: marching 2 laps  Aquatic Exercise Activity 3: HR AND TR X20  Aquatic Exercise Activity 4: squats x20  Aquatic Exercise Activity 5: SLR retro, lateral, forward X 20 EA  Aquatic Exercise Activity 6: distraction flexion and Deep water Biking  x5 min EA  Aquatic Exercise Activity 7: hs and gastroc stretches 3x15\" each  Aquatic Exercise Activity 8: step ups X 10 EA      Goals:  Active       PT Problem       PT Goals       Start:  11/07/23    Expected End:  12/26/23       In 4-6 weeks pt will demonstrate:    1) Decreased pain in bilateral knees to 1-2/10 max to allow greater ease with ADL, household chores  2) increased standing and walking tolerance to 45 mins to allow increased mobility in the community and greater ease with household chores.  3) Increased strength to 5/5 bilateral LE to allow greater ease with household tasks, ambulation, and ADL's            "

## 2024-01-03 ENCOUNTER — APPOINTMENT (OUTPATIENT)
Dept: PHYSICAL THERAPY | Facility: HOSPITAL | Age: 69
End: 2024-01-03
Payer: COMMERCIAL

## 2024-01-06 LAB
ATRIAL RATE: 66 BPM
P AXIS: 52 DEGREES
P OFFSET: 176 MS
P ONSET: 127 MS
PR INTERVAL: 186 MS
Q ONSET: 220 MS
QRS COUNT: 11 BEATS
QRS DURATION: 86 MS
QT INTERVAL: 382 MS
QTC CALCULATION(BAZETT): 400 MS
QTC FREDERICIA: 394 MS
R AXIS: 73 DEGREES
T AXIS: 35 DEGREES
T OFFSET: 411 MS
VENTRICULAR RATE: 66 BPM

## 2024-01-08 ENCOUNTER — OFFICE VISIT (OUTPATIENT)
Dept: UROLOGY | Facility: CLINIC | Age: 69
End: 2024-01-08
Payer: COMMERCIAL

## 2024-01-08 VITALS
BODY MASS INDEX: 36.12 KG/M2 | WEIGHT: 184 LBS | DIASTOLIC BLOOD PRESSURE: 70 MMHG | TEMPERATURE: 97.5 F | HEART RATE: 81 BPM | HEIGHT: 60 IN | SYSTOLIC BLOOD PRESSURE: 129 MMHG

## 2024-01-08 DIAGNOSIS — G35 MULTIPLE SCLEROSIS (MULTI): ICD-10-CM

## 2024-01-08 DIAGNOSIS — Z46.89 PESSARY MAINTENANCE: ICD-10-CM

## 2024-01-08 DIAGNOSIS — N39.41 URGE URINARY INCONTINENCE: ICD-10-CM

## 2024-01-08 DIAGNOSIS — N95.2 VAGINAL ATROPHY: ICD-10-CM

## 2024-01-08 DIAGNOSIS — N39.3 FEMALE STRESS INCONTINENCE: ICD-10-CM

## 2024-01-08 DIAGNOSIS — R31.21 ASYMPTOMATIC MICROSCOPIC HEMATURIA: Primary | ICD-10-CM

## 2024-01-08 PROCEDURE — 3078F DIAST BP <80 MM HG: CPT | Performed by: OBSTETRICS & GYNECOLOGY

## 2024-01-08 PROCEDURE — 3074F SYST BP LT 130 MM HG: CPT | Performed by: OBSTETRICS & GYNECOLOGY

## 2024-01-08 PROCEDURE — 1126F AMNT PAIN NOTED NONE PRSNT: CPT | Performed by: OBSTETRICS & GYNECOLOGY

## 2024-01-08 PROCEDURE — 99213 OFFICE O/P EST LOW 20 MIN: CPT | Performed by: OBSTETRICS & GYNECOLOGY

## 2024-01-08 PROCEDURE — 1159F MED LIST DOCD IN RCRD: CPT | Performed by: OBSTETRICS & GYNECOLOGY

## 2024-01-08 PROCEDURE — 1036F TOBACCO NON-USER: CPT | Performed by: OBSTETRICS & GYNECOLOGY

## 2024-01-08 PROCEDURE — 4010F ACE/ARB THERAPY RXD/TAKEN: CPT | Performed by: OBSTETRICS & GYNECOLOGY

## 2024-01-08 ASSESSMENT — PAIN SCALES - GENERAL: PAINLEVEL: 0-NO PAIN

## 2024-01-08 NOTE — PROGRESS NOTES
Subjective   Patient ID: Jes Roper is a 68 y.o. female who presents for pessary refitting.   HPI  68-year-old with #4 incontinence ring pessary with history of genuine stress urinary incontinence having undergone 10/19/2020 excision of urethral polyp and cystoscopy for history of urinary urgency, stress incontinence, vaginal atrophy with urethral polyp, multiple sclerosis, pelvic floor weakness, and constipation with urinary urgency and frequency.     The patient did not note any changes in her LUTS with the pessary out. She notes worsening urinary urgency and frequency when she takes her diuretics. She notes daytime frequency associated with her diuretic use. She notes 1-2 episodes of nocturia. Gemtesa samples were provided to her. She denies any UTI like symptoms.     She denies any vaginal complaints, no abnormal bleeding or discharge.     She denies any bowel related complaints, no fecal or flatal incontinence.    She has no other complaints.      From Previous note  68-year-old with #4 incontinence ring pessary with history of genuine stress urinary incontinence having undergone 10/19/2020 excision of urethral polyp and cystoscopy for history of urinary urgency, stress incontinence, vaginal atrophy with urethral polyp, multiple sclerosis, pelvic floor weakness, and constipation with urinary urgency and frequency.     The patient required to have an MRI and was unable to proceed due to the pessary. She presents to get the pessary removed to be able to have the MRI.     She has a fall 10/17 and is noting worsening MS symptoms since then.     She notes worsening urinary urgency and frequency when she takes her diuretics. She notes daytime frequency associated with her diuretic use. She notes 1-2 episodes of nocturia.    She denies any vaginal complaints, no abnormal bleeding or discharge.     She denies any bowel related complaints, no fecal or flatal incontinence.    She has no other complaints.    From  "Previous note  68-year-old with #4 incontinence ring pessary with history of genuine stress urinary incontinence having undergone 10/19/2020 excision of urethral polyp and cystoscopy for history of urinary urgency, stress incontinence, vaginal atrophy with urethral polyp, multiple sclerosis, pelvic floor weakness, and constipation with urinary urgency and frequency.     She is overall very satisfied with her pessary. She denies any abnormal vaginal bleeding or discharge. She notes improvement in her stress urinary incontinence complaints.     She is no longer taking Myrbetriq. \"I have a full plate\". She is presently managing her mother who is 98 and on hospice. She also has had recent increase in her diuretic requirements. She notes daytime frequency associated with her diuretic use. She notes 1-2 episodes of nocturia.     She has no other complaints.     From previous note     67-year-old with #4 incontinence ring pessary with history of genuine stress urinary incontinence having undergone 10/19/2020 excision of urethral polyp and cystoscopy for history of urinary urgency, stress incontinence, vaginal atrophy with urethral polyp, multiple sclerosis, pelvic floor weakness, and constipation with urinary urgency and frequency.     The patient was last seen in December 2022. The patient has not started taking the Myrbetriq as it was cost prohibitive for her. She is switching insurance and will contact again after she is established. She continues to note urinary urgency and incontinence complaints. She continues to note rare stress urinary incontinence and states that she \" some days when its under control and some days she cannot move without leaking\". She denies any UTI like symptoms. She is satisfied from the pessary standpoint.      She denies any bowel related complaints, no fecal or flatal incontinence.     She denies any vaginal complaints, no abnormal vaginal bleeding or discharge. She is utilizing the vaginal " "estrogen cream.      She has no other complaints.   From previous note  67-year-old with #4 incontinence ring pessary with history of genuine stress urinary incontinence having undergone 10/19/2020 excision of urethral polyp and cystoscopy for history of urinary urgency, stress incontinence, vaginal atrophy with urethral polyp, multiple sclerosis, pelvic floor weakness, and constipation with urinary urgency and frequency presenting today for pessary maintenance.     The patient state she continues to note rare stress urinary incontinence. She is overall satisfied with her incontinence ring. She denies any vaginal complaints. She denies any UTI-like symptoms. He does however note episodic urinary urgency, frequency, and incontinence. \"It comes in waves\". She wishes to proceed with Myrbetriq in 2023.     She denies any bowel related complaints, no fecal or flatal incontinence.     She denies any vaginal complaints, no abnormal vaginal bleeding or discharge.     She has no other complaints.      From previous note   67-year-old with history of MS with mixed urinary incontinence and #4 incontinence ring pessary having undergone 10/19/2020 excision of urethral polyp and cystoscopy for history of urethral polyp presenting for pessary follow-up.     She is overall very satisfied with her pessary ring. She denies any UTI-like symptoms. She feels that she is emptying her bladder appropriately. However she has noted an episode of vaginal spotting over the last week. She otherwise denies any abnormal vaginal discharge.     At her last appointment, we discussed her concerns for several episodes of urinary incontinence. However it was unclear whether this was urine or vaginal discharge. She was instructed to proceed with a phenazopyridine challenge. She has been unable to perform this. She does not have any particular lower urinary tract complaints at this time. .      She has a \"very full plate\" as her 97-year-old mother " "recently moved in. She has not had the opportunity to \"really think about\" her lower urinary tract symptoms.     She has no other complaints.     From previous note  66-year-old presenting for incontinence ring pessary follow-up after 10/19/2020 excision of urethral polyp and cystoscopy for history of urethral polyp, urinary urgency, female stress incontinence, and vaginal atrophy with history of MS.     The patient was refitted with a #4 incontinence ring at her last appointment. She is overall very satisfied with this. She feels that this has significantly improved her urinary leakage as well as her urgency complaints. She wishes to continue this therapy moving forward.     She denies any UTI-like symptoms.     She has picked up her vaginal estrogen therapy but has not begun this.     She has no other complaints.     From previous note  65-year-old with urethral polyp, urinary urgency, and vaginal atrophy.     The patient continues to utilize her vaginal estrogen therapy. She has not noted any improvement in her urethral polyp complaints. She denies any gross hematuria. She denies any significant changes in her urinary urgency.     She wishes to proceed with definitive surgical management.     She has no other complaints.     From previous note  65-year-old presenting as a referral from Dr. Carvalho with complaints of a urethral mass, gross hematuria, and urinary urgency.     The patient was originally evaluated in March with complaints of vaginal bleeding. She was noted to have urethral prolapse and vaginal atrophy and started on vaginal estrogen cream. Upon reevaluation her prolapse had improved but upon evaluation by Dr. Carvalho was found to have a urethral polyp. Cystoscopy at that time was normal and upper tract imaging demonstrated nonobstructive bilateral nephrolithiasis.     The patient continues to note episodic spotting when wiping. She does note urinary urgency and frequency but denies any urge related " incontinence. She does note rare stress urinary incontinence. She notes nocturia up to 3 times. She denies enuresis. She denies a history of chronic urinary tract infections.     She is not sexually active. She does suffer from MS. She denies any vaginal bulge complaints. She denies any abnormal vaginal discharge.     She has episodic diarrhea and constipation. She denies any fecal or flatal incontinence.     She has no other complaints.   Review of Systems  Constitutional: No fever, No chills and No fatigue.   Eyes: No vision problems and No dryness of the eyes.   ENT: No dry mouth, No hearing loss and No nosebleeds.   Cardiovascular: No chest pain, No palpitations and No orthopnea.   Respiratory: No shortness of breath, No cough and No wheezing.   Gastrointestinal: No abdominal pain, No constipation, No nausea, No diarrhea, No vomiting and No melena.   Genitourinary: As noted in HPI.   Musculoskeletal: No back pain, No myalgias, No muscle weakness, No joint swelling and No leg edema.   Integumentary: No rashes, No skin lesion and No itching.   Neurological: No headache, No numbness and No dizziness.   Psychiatric: No sleep disturbances, No anxiety and No depression.   Endocrine: No hot flashes, No loss of hair and No hirsutism.   Hematologic/Lymphatic: No swollen glands, No tendency for easy bleeding and No tendency for easy bruising.   All other systems have been reviewed and are negative for complaint.        Objective   Physical Exam      PHYSICAL EXAMINATION:  No LMP recorded.  There is no height or weight on file to calculate BMI.  There were no vitals taken for this visit.  General Appearance: well appearing  Neuro: Alert and oriented   HEENT: mucous membranes moist, neck supple  Resp: No respiratory distress, normal work of breathing  MSK: normal range of motion, gait appropriate    Patient ID: Jes Roper is a 68 y.o. female.    Procedures      An attempt was made to replace the patient's #4  incontinence ring but this was not possible as this appeared to be significantly too large.  The patient was refitted with a #3 incontinence dish as this was the only incontinence pessary available today.    Assessment/Plan   68-year-old with a history of a #4 incontinence ring pessary with history of genuine stress urinary incontinence having undergone 10/19/2020 excision of urethral polyp and cystoscopy for history of urinary urgency, stress incontinence, vaginal atrophy with urethral polyp, multiple sclerosis, pelvic floor weakness, and constipation with urinary urgency and frequency.     1.  An attempt was made to replace the patient's incontinence ring today 1/8/2024 but this ring was noted to be too large.  She was replaced with a #3 incontinence dish.  We discussed the importance of continuing vaginal estrogen therapy.  She may benefit from a #2 or #3 incontinence ring in the future.  She was having excellent results with her #4 incontinence ring previously. We have previously discussed the patient's urodynamics. We specifically discussed therapy for her stress urinary incontinence. We have previously discussed pelvic floor physical therapy, pessary management, and definitive management with a mid urethral sling or Bulkamid therapy. She is overall very satisfied with her incontinence ring.we again discussed urodynamics findings noting terminal contraction noted at 300 cc. We again discussed the importance of timed voiding and fluid management strategies. She had no benefits with oxybutynin therapy in the past.  Her daughter is a pharmacist and had asked about Vesicare and we discussed that this was a medication similar to the oxybutynin she had previously used with minimal benefits.  She was provided 6 weeks worth of Gemtesa samples to begin now.  We did again discuss at length today PTNS, Botox, and InterStim including the various risks of these procedures. The patient was previously provided information on  all 3 of these procedures.       2. We discussed the patient's constipation. We discussed titrating daily MiraLAX therapy. We discussed the importance of daily fiber therapy. She will continue this moving forward.     #3 she will continue her vaginal estrogen therapy 3 times weekly.     4. The patient will follow-up in 1 week for possible pessary refitting with a #2 or #3 incontinence ring.  She will follow-up in 4 to 6 weeks to discuss her Gemtesa therapy.  She will require pessary maintenance in April/May 2024.     ANTWON Pope MD      Scribe Attestation  By signing my name below, I, Shae Bojorquez attest that this documentation has been prepared under the direction and in the presence of Ajay Pope MD. All medical record entries made by the Scribe were at my direction or personally dictated by me. I have reviewed the chart and agree that the record accurately reflects my personal performance of the history, physical exam, discussion and plan.

## 2024-01-09 ENCOUNTER — TREATMENT (OUTPATIENT)
Dept: PHYSICAL THERAPY | Facility: HOSPITAL | Age: 69
End: 2024-01-09
Payer: COMMERCIAL

## 2024-01-09 DIAGNOSIS — M17.0 PRIMARY OSTEOARTHRITIS OF BOTH KNEES: Primary | ICD-10-CM

## 2024-01-09 DIAGNOSIS — M17.10 ARTHRITIS OF KNEE: ICD-10-CM

## 2024-01-09 PROCEDURE — 97110 THERAPEUTIC EXERCISES: CPT | Mod: GP | Performed by: PHYSICAL THERAPIST

## 2024-01-09 PROCEDURE — 97113 AQUATIC THERAPY/EXERCISES: CPT | Mod: GP,CQ

## 2024-01-09 ASSESSMENT — PAIN DESCRIPTION - DESCRIPTORS: DESCRIPTORS: ACHING;SHARP

## 2024-01-09 ASSESSMENT — PAIN - FUNCTIONAL ASSESSMENT
PAIN_FUNCTIONAL_ASSESSMENT: 0-10
PAIN_FUNCTIONAL_ASSESSMENT: 0-10

## 2024-01-09 ASSESSMENT — PAIN SCALES - GENERAL
PAINLEVEL_OUTOF10: 6
PAINLEVEL_OUTOF10: 4

## 2024-01-09 NOTE — PROGRESS NOTES
Physical Therapy    Physical Therapy Treatment    Patient Name: Jes Roper  MRN: [id number]  Today's Date: 01/09/24     Visit #11  Assessment: some endurance and strength gains, but pain levels unchanged, may benefit from continued skilled PT after nerve block procedure.     Plan: Pt will be on hold until after she has nerve block procedure by Dr Kerr on 1/26/24 in order to conserve PT visits.       Current Problem  1. Primary osteoarthritis of both knees                Subjective  At rest knee pain is 4/10 but with weight bearing or walking it was 8/10 today coming in from outside. Her neck is also bothering her as well as shoulders. Left knee has been hurting worse., on Jan 26th.,  she is having a nerve block by Dr Kerr. Her standing and walking times have improved, she reports up to 10 min tolerance. She is going to have MRI of neck and lumbar spine soon.   Precautions     Pain       Objective  Right LE Hip flex, hip abd are 4+/5, all others are 5/5, LEFT LE all 5/5 except hip abd 4+/5.  Outcome Measures:  Other Measures  Lower Extremity Funtional Score (LEFS): 12  Treatments:    OP EDUCATION: Reviewed HEP with patient. Discussed being on hold until nerve block, in order to conserve PT visits, as pt often requires PT services throughout the year.     Goals:  Active       PT Problem       PT Goals       Start:  11/07/23    Expected End:  03/10/24       In 4-6 weeks pt will demonstrate:    1) Decreased pain in bilateral knees to 1-2/10 max to allow greater ease with ADL, household chores      GOAL NOT MET  2) increased standing and walking tolerance to 45 mins to allow increased mobility in the community and greater ease with household chores.  GOAL PROGRESSING  3) Increased strength to 5/5 bilateral LE to allow greater ease with household tasks, ambulation, and ADL's   GOAL PROGRESSING            DC instructions

## 2024-01-09 NOTE — PROGRESS NOTES
"Physical Therapy    Physical Therapy Treatment    Patient Name: Jes Roper  MRN: [id number]  Today's Date: 1/9/2024  Time Calculation  Start Time: 1400  Stop Time: 1445  Time Calculation (min): 45 min  VISIT 6  2-2024    Assessment:  PT Assessment  Assessment Comment: pt reports fatigue in [place] LEs no change in pain with all exs    Plan:  OP PT Plan  PT Plan:  (progress as able)    Current Problem  1. Arthritis of knee  Follow Up In Physical Therapy          Subjective  pt reports Knees sore after land therapy       Precautions  Precautions  Precautions Comment: no change     Pain  Pain Assessment: 0-10  Pain Score: 6  Pain Type: Chronic pain  Pain Location: Knee  Pain Orientation: Left, Right  Pain Descriptors: Aching, Sharp  Pain Frequency: Constant/continuous    Objective         Treatments:     Aquatic Exercise  Aquatic Exercise Performed: Yes  Aquatic Exercise Activity 1: ambulation forward, retro, lateral x2 laps  Aquatic Exercise Activity 2: marching 2 laps  Aquatic Exercise Activity 3: HR AND TR X20  Aquatic Exercise Activity 4: squats x20  Aquatic Exercise Activity 5: SLR retro, lateral, forward X 20 EA  Aquatic Exercise Activity 6: distraction flexion and Deep water Biking  x5 min EA  Aquatic Exercise Activity 7: hs and gastroc stretches 3x15\" each  Aquatic Exercise Activity 8: step ups X 10 EA      Goals:  Active       PT Problem       PT Goals       Start:  11/07/23    Expected End:  12/26/23       In 4-6 weeks pt will demonstrate:    1) Decreased pain in bilateral knees to 1-2/10 max to allow greater ease with ADL, household chores  2) increased standing and walking tolerance to 45 mins to allow increased mobility in the community and greater ease with household chores.  3) Increased strength to 5/5 bilateral LE to allow greater ease with household tasks, ambulation, and ADL's            "
Patient informed

## 2024-01-12 ENCOUNTER — APPOINTMENT (OUTPATIENT)
Dept: UROLOGY | Facility: CLINIC | Age: 69
End: 2024-01-12
Payer: COMMERCIAL

## 2024-01-13 DIAGNOSIS — F43.20 ADJUSTMENT DISORDER, UNSPECIFIED: ICD-10-CM

## 2024-01-15 RX ORDER — TIZANIDINE 2 MG/1
TABLET ORAL
Qty: 180 TABLET | Refills: 1 | Status: SHIPPED | OUTPATIENT
Start: 2024-01-15

## 2024-01-25 ENCOUNTER — APPOINTMENT (OUTPATIENT)
Dept: PHYSICAL MEDICINE AND REHAB | Facility: CLINIC | Age: 69
End: 2024-01-25
Payer: COMMERCIAL

## 2024-01-26 ENCOUNTER — HOSPITAL ENCOUNTER (OUTPATIENT)
Dept: GASTROENTEROLOGY | Facility: HOSPITAL | Age: 69
Discharge: HOME | End: 2024-01-26
Payer: COMMERCIAL

## 2024-01-26 ENCOUNTER — HOSPITAL ENCOUNTER (OUTPATIENT)
Dept: RADIOLOGY | Facility: HOSPITAL | Age: 69
Discharge: HOME | End: 2024-01-26
Payer: COMMERCIAL

## 2024-01-26 VITALS
OXYGEN SATURATION: 97 % | SYSTOLIC BLOOD PRESSURE: 99 MMHG | BODY MASS INDEX: 35.93 KG/M2 | TEMPERATURE: 97.3 F | WEIGHT: 183 LBS | HEIGHT: 60 IN | HEART RATE: 70 BPM | RESPIRATION RATE: 16 BRPM | DIASTOLIC BLOOD PRESSURE: 70 MMHG

## 2024-01-26 DIAGNOSIS — M17.10 ARTHRITIS OF KNEE: ICD-10-CM

## 2024-01-26 DIAGNOSIS — R52 PAIN: ICD-10-CM

## 2024-01-26 PROCEDURE — 64454 NJX AA&/STRD GNCLR NRV BRNCH: CPT | Mod: 50 | Performed by: PHYSICAL MEDICINE & REHABILITATION

## 2024-01-26 PROCEDURE — 2500000005 HC RX 250 GENERAL PHARMACY W/O HCPCS: Performed by: PHYSICAL MEDICINE & REHABILITATION

## 2024-01-26 PROCEDURE — 64454 NJX AA&/STRD GNCLR NRV BRNCH: CPT | Performed by: PHYSICAL MEDICINE & REHABILITATION

## 2024-01-26 PROCEDURE — 2500000004 HC RX 250 GENERAL PHARMACY W/ HCPCS (ALT 636 FOR OP/ED): Performed by: PHYSICAL MEDICINE & REHABILITATION

## 2024-01-26 PROCEDURE — 77003 FLUOROGUIDE FOR SPINE INJECT: CPT

## 2024-01-26 RX ORDER — LIDOCAINE HYDROCHLORIDE 5 MG/ML
INJECTION, SOLUTION INFILTRATION; INTRAVENOUS AS NEEDED
Status: COMPLETED | OUTPATIENT
Start: 2024-01-26 | End: 2024-01-26

## 2024-01-26 RX ORDER — BUPIVACAINE HYDROCHLORIDE 5 MG/ML
INJECTION, SOLUTION EPIDURAL; INTRACAUDAL AS NEEDED
Status: COMPLETED | OUTPATIENT
Start: 2024-01-26 | End: 2024-01-26

## 2024-01-26 RX ADMIN — BUPIVACAINE HYDROCHLORIDE 10 ML: 5 INJECTION, SOLUTION EPIDURAL; INTRACAUDAL; PERINEURAL at 09:07

## 2024-01-26 RX ADMIN — LIDOCAINE HYDROCHLORIDE 10 ML: 5 INJECTION, SOLUTION INFILTRATION at 09:05

## 2024-01-26 RX ADMIN — LIDOCAINE HYDROCHLORIDE 10 ML: 5 INJECTION, SOLUTION INFILTRATION at 09:11

## 2024-01-26 RX ADMIN — BUPIVACAINE HYDROCHLORIDE 10 ML: 5 INJECTION, SOLUTION EPIDURAL; INTRACAUDAL; PERINEURAL at 09:14

## 2024-01-26 ASSESSMENT — PAIN - FUNCTIONAL ASSESSMENT
PAIN_FUNCTIONAL_ASSESSMENT: 0-10
PAIN_FUNCTIONAL_ASSESSMENT: 0-10

## 2024-01-26 ASSESSMENT — COLUMBIA-SUICIDE SEVERITY RATING SCALE - C-SSRS
1. IN THE PAST MONTH, HAVE YOU WISHED YOU WERE DEAD OR WISHED YOU COULD GO TO SLEEP AND NOT WAKE UP?: NO
2. HAVE YOU ACTUALLY HAD ANY THOUGHTS OF KILLING YOURSELF?: NO
6. HAVE YOU EVER DONE ANYTHING, STARTED TO DO ANYTHING, OR PREPARED TO DO ANYTHING TO END YOUR LIFE?: NO

## 2024-01-26 ASSESSMENT — PAIN SCALES - GENERAL
PAINLEVEL_OUTOF10: 7
PAINLEVEL_OUTOF10: 0 - NO PAIN

## 2024-01-26 NOTE — PROCEDURES
Nerve Block    Date/Time: 1/26/2024 9:02 AM    Performed by: Alonso Kerr MD  Authorized by: Alonso Kerr MD    Consent:     Consent obtained:  Written    Consent given by:  Patient    Risks, benefits, and alternatives were discussed: yes      Risks discussed:  Nerve damage, infection, allergic reaction, swelling, bleeding and pain    Alternatives discussed:  No treatment, delayed treatment and alternative treatment  Universal protocol:     Procedure explained and questions answered to patient or proxy's satisfaction: yes      Relevant documents present and verified: yes      Test results available: yes      Imaging studies available: yes      Required blood products, implants, devices, and special equipment available: yes      Site/side marked: yes      Immediately prior to procedure, a time out was called: yes      Patient identity confirmed:  Verbally with patient, hospital-assigned identification number and arm band  Comments:      Diagnostic genicular nerve block    The patient was positioned comfortably in the suppine position and was prepped and draped in the usual sterile manner.  Sterile precautions, including sterile gloves, disposable cap and masks were worn for the procedure at all times. Skeletal landmarks were identified under fluoroscopy. There was no evidence of infection at the site(s) of needle insertion. A final time-out was performed.  At each insertion site, the skin and subcutaneous tissue was anesthetized with 1% lidocaine with bicarbonate.  The needles were inserted and advanced under fluoroscopic guidance in multiple planes, and placed at the junction of the lateral and medial femoral condyles and the diaphysis of the femur and the junction of the medial tibial diaphysis and tibial flare. Following negative aspiration for blood, medication was injected the Superior Lateral, Superior Medial and Inferior Medial Genicular Nerves.  The needles were flushed and/or restyletted and removed.   Pressure was held, and after ensuring hemostasis and the absence of hematoma, an adhesive bandage was applied to the site of needle insertion.      Laterality:   [bilateral]  Medications at each site: [0.5ml 0.5% bupivacaine]

## 2024-01-26 NOTE — DISCHARGE INSTRUCTIONS
You had a pain management procedure today.    Observe/ monitor for the following signs & symptoms:  If you notice Excessive bleeding (slow general oozing that completely soaks the dressing, or fresh bright red bleeding).   In either case, apply pressure to the area, elevate it if possible & call your doctor at once.    Also observe for:  Change in color  Numbness/tingling  Coldness to the touch  Swelling  Drainage  Temperature of 101.5 or higher.  Increased, uncontrollable pain.    *If you notice the above signs & symptoms, please call your doctor right away!*      Discharge Instructions:    Your pain may not be gone immediately after this procedure; it generally takes 3 to 5 days for the steroid to work.   Keep the needle site clean & dry for 24 hours.  Continue your present medications.  Make an appointment to see your doctor in 2-3 weeks.  If any problems occur, or if you have any further questions, please call as soon as possible. If you find that you cannot reach your doctor, but feel that the condition nees a doctor's attention, go to the closest emergency department & take this discharge paper with you.       Dr. Kerr's Office: [phone]

## 2024-01-26 NOTE — H&P
Patient ID: Patient with bilateral knee osteoarthritis who presents for the scheduled procedure.  No changes since last visit      Patient denies any recent antibiotic use or infections, denies any blood thinner use, and denies contrast or local anesthetic allergies           GENERAL EXAM  Vital Signs: Vital signs to include heart rate, respiration rate, blood pressure, and temperature were reviewed.  General Appearance:  Awake, alert, healthy appearing, well developed, No acute distress.  Head: Normocephalic without evidence of head injury.  Neck: The appearance of the neck was normal without swelling with a midline trachea.  Eyes: The eyelids and eyebrows exhibited no abnormalities.  Pupils were not pin-point.  Sclera was without icterus.  Lungs: Respiration rhythm and depth was normal.  Respiratory movements were normal without labored breathing.  Cardiovascular: No peripheral edema was present.    Neurological: Patient was oriented to time, place, and person.  Speech was normal.  Balance, gait, and stance were unremarkable.    Psychiatric: Appearance was normal with appropriate dress.  Mood was euthymic and affect was normal.  Skin: Affected regions were without ecchymosis or skin lesions.        Physical exam as above except:        Assessment/Plan    Patient with bilateral knee osteoarthritis here for bilateral diagnostic genicular nerve block

## 2024-02-10 DIAGNOSIS — M79.7 FIBROMYALGIA: ICD-10-CM

## 2024-02-12 ENCOUNTER — OFFICE VISIT (OUTPATIENT)
Dept: PAIN MEDICINE | Facility: HOSPITAL | Age: 69
End: 2024-02-12
Payer: COMMERCIAL

## 2024-02-12 VITALS
SYSTOLIC BLOOD PRESSURE: 95 MMHG | WEIGHT: 186.1 LBS | HEART RATE: 75 BPM | HEIGHT: 60 IN | RESPIRATION RATE: 16 BRPM | BODY MASS INDEX: 36.53 KG/M2 | DIASTOLIC BLOOD PRESSURE: 55 MMHG

## 2024-02-12 DIAGNOSIS — M17.0 PRIMARY OSTEOARTHRITIS OF BOTH KNEES: ICD-10-CM

## 2024-02-12 DIAGNOSIS — M17.10 ARTHRITIS OF KNEE: Primary | ICD-10-CM

## 2024-02-12 DIAGNOSIS — M54.12 CERVICAL NEURITIS: ICD-10-CM

## 2024-02-12 DIAGNOSIS — M15.9 GENERALIZED OSTEOARTHRITIS OF MULTIPLE SITES: ICD-10-CM

## 2024-02-12 PROCEDURE — 1159F MED LIST DOCD IN RCRD: CPT | Performed by: CLINICAL NURSE SPECIALIST

## 2024-02-12 PROCEDURE — 1160F RVW MEDS BY RX/DR IN RCRD: CPT | Performed by: CLINICAL NURSE SPECIALIST

## 2024-02-12 PROCEDURE — 3074F SYST BP LT 130 MM HG: CPT | Performed by: CLINICAL NURSE SPECIALIST

## 2024-02-12 PROCEDURE — 1126F AMNT PAIN NOTED NONE PRSNT: CPT | Performed by: CLINICAL NURSE SPECIALIST

## 2024-02-12 PROCEDURE — 3078F DIAST BP <80 MM HG: CPT | Performed by: CLINICAL NURSE SPECIALIST

## 2024-02-12 PROCEDURE — 99214 OFFICE O/P EST MOD 30 MIN: CPT | Performed by: CLINICAL NURSE SPECIALIST

## 2024-02-12 PROCEDURE — 1036F TOBACCO NON-USER: CPT | Performed by: CLINICAL NURSE SPECIALIST

## 2024-02-12 PROCEDURE — 4010F ACE/ARB THERAPY RXD/TAKEN: CPT | Performed by: CLINICAL NURSE SPECIALIST

## 2024-02-12 RX ORDER — DIAZEPAM 10 MG/1
10 TABLET ORAL DAILY PRN
COMMUNITY

## 2024-02-12 RX ORDER — PREGABALIN 100 MG/1
200 CAPSULE ORAL DAILY
Qty: 60 CAPSULE | Refills: 2 | Status: SHIPPED | OUTPATIENT
Start: 2024-02-12 | End: 2024-06-04 | Stop reason: SDUPTHER

## 2024-02-12 ASSESSMENT — ENCOUNTER SYMPTOMS
LOSS OF SENSATION IN FEET: 0
OCCASIONAL FEELINGS OF UNSTEADINESS: 1
DEPRESSION: 0

## 2024-02-12 ASSESSMENT — PATIENT HEALTH QUESTIONNAIRE - PHQ9
2. FEELING DOWN, DEPRESSED OR HOPELESS: NOT AT ALL
SUM OF ALL RESPONSES TO PHQ9 QUESTIONS 1 AND 2: 0
1. LITTLE INTEREST OR PLEASURE IN DOING THINGS: NOT AT ALL

## 2024-02-12 ASSESSMENT — COLUMBIA-SUICIDE SEVERITY RATING SCALE - C-SSRS
6. HAVE YOU EVER DONE ANYTHING, STARTED TO DO ANYTHING, OR PREPARED TO DO ANYTHING TO END YOUR LIFE?: NO
1. IN THE PAST MONTH, HAVE YOU WISHED YOU WERE DEAD OR WISHED YOU COULD GO TO SLEEP AND NOT WAKE UP?: NO
2. HAVE YOU ACTUALLY HAD ANY THOUGHTS OF KILLING YOURSELF?: NO

## 2024-02-12 NOTE — ASSESSMENT & PLAN NOTE
69-year-old female with previous medical history consistent with multiple sclerosis, widespread myalgia as well as polyarticular pain.  Patient also experiencing cervical as well as lumbar radicular symptoms.  Patient presented at her last office visit  most bothered by bilateral knee pain.  She continues to experience knee pain as well as cervical and lumbar pain.  She does endorse numbness/tingling and weakness which is unchanged from prior exam.  She denies any bowel/bladder dysfunction.  Patient has had previous falls and is unsure if this is attributed to her knee pain/instability or MS.  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  Cervical MRI did not indicate any severe canal or cord compression.  She has been referred to a neurosurgeon by her neurologist and is scheduled for evaluation on 2/22/2022 for further recommendations.  She proceeded with bilateral genicular nerve blocks which provided 100% relief for several hours.  She is unsure if she would like to proceed with genicular RFA at this time.  She would like evaluation by neurosurgery before proceeding with radiofrequency ablation.  Once she has been evaluated by neurosurgery and they have discussed treatment recommendations she may contact our office if she would like to proceed with radiofrequency ablation.  She would like to restart physical therapy as she felt that aqua therapy was very helpful.  She will discuss this further with her neurosurgeon.  She is continuing to benefit from duloxetine and Lyrica as prescribed by her rheumatologist.  Continued benefit with baclofen for muscle tightness and spasm.  Advised patient to limit use of tizanidine secondary to low blood pressure and potential for hypotension with this medication.  Pain reviewed with patient at today's office visit.    -Patient will proceed with neurosurgery evaluation  and review of recent cervical and lumbar MRI.  -She may resume aqua therapy after evaluation by neurosurgery.  -Patient will contact our office if she wants to proceed with genicular RFA since the patient had successful diagnostic genicular nerve blocks bilaterally.  She will contact our office after evaluation by neurosurgery.  If patient would like to proceed with genicular RFA we will schedule approximately 3 weeks apart.

## 2024-02-12 NOTE — PROGRESS NOTES
Subjective   Patient ID: Jes Roper is a 69 y.o. female who presents for No chief complaint on file..  HPI    Pt has bilateral knee pain. Patient also fibro, MS and arthritis. Pt also intermittent sciatic.     OA 12/7/23 1/26/24 Genicular Nerve Block- 100% relief     [facility]  OARRS:  No data recorded  {OARRSReview:11094}    Is the patient prescribed a combination of a benzodiazepine and opioid?  {Opioid/Benzo:20428}    Last Urine Drug Screen / ordered today: {YES (DEF)/NO:16119}  Recent Results (from the past 8760 hour(s))   Amphetamine Confirm, Urine    Collection Time: 03/15/23 10:52 AM   Result Value Ref Range    Amphetamines,Urine <50 ng/mL    MDA, Urine <200 ng/mL    MDEA, Urine <200 ng/mL    MDMA, Urine <200 ng/mL    Methamphetamine Quant, Ur <200 ng/mL    Phentermine,Urine <200 ng/mL   OPIATE/OPIOID/BENZO PRESCRIPTION COMPLIANCE    Collection Time: 03/15/23 10:52 AM   Result Value Ref Range    DRUG SCREEN COMMENT URINE SEE BELOW     Creatine, Urine 175.3 mg/dL    Amphetamine Screen, Urine PRESUMPTIVE POSITIVE (A) NEGATIVE    Barbiturate Screen, Urine PRESUMPTIVE NEGATIVE NEGATIVE    Cannabinoid Screen, Urine PRESUMPTIVE NEGATIVE NEGATIVE    Cocaine Screen, Urine PRESUMPTIVE NEGATIVE NEGATIVE    PCP Screen, Urine PRESUMPTIVE NEGATIVE NEGATIVE    7-Aminoclonazepam <25 Cutoff <25 ng/mL    Alpha-Hydroxyalprazolam <25 Cutoff <25 ng/mL    Alpha-Hydroxymidazolam <25 Cutoff <25 ng/mL    Alprazolam <25 Cutoff <25 ng/mL    Chlordiazepoxide <25 Cutoff <25 ng/mL    Clonazepam <25 Cutoff <25 ng/mL    Diazepam <25 Cutoff <25 ng/mL    Lorazepam <25 Cutoff <25 ng/mL    Midazolam <25 Cutoff <25 ng/mL    Nordiazepam 50 (A) Cutoff <25 ng/mL    Oxazepam 315 (A) Cutoff <25 ng/mL    Temazepam 263 (A) Cutoff <25 ng/mL    Zolpidem <25 Cutoff <25 ng/mL    Zolpidem Metabolite (ZCA) <25 Cutoff <25 ng/mL    6-Acetylmorphine <25 Cutoff <25 ng/mL    Codeine <50 Cutoff <50 ng/mL     Hydrocodone <25 Cutoff <25 ng/mL    Hydromorphone <25 Cutoff <25 ng/mL    Morphine Urine <50 Cutoff <50 ng/mL    Norhydrocodone <25 Cutoff <25 ng/mL    Noroxycodone <25 Cutoff <25 ng/mL    Oxycodone <25 Cutoff <25 ng/mL    Oxymorphone <25 Cutoff <25 ng/mL    Tramadol <50 Cutoff <50 ng/mL    O-Desmethyltramadol <50 Cutoff <50 ng/mL    Fentanyl <2.5 Cutoff<2.5 ng/mL    Norfentanyl <2.5 Cutoff<2.5 ng/mL    METHADONE CONFIRMATION,URINE <25 Cutoff <25 ng/mL    EDDP <25 Cutoff <25 ng/mL     {ResultsAsExpected:55100}    Controlled Substance Agreement:  Date of the Last Agreement: ***  {CSA Attest:27007}    Monitoring and compliance:    ORT:    PDUQ:    Office Agreement:      Review of Systems    ROS:   General: No fevers, chills, weight loss  Skin: Negative for lesions  Eyes: No acute vision changes  Ears: No vertigo  Nose, mouth, throat: No difficulty swallowing or speaking  Respiratory: No cough, shortness of breath, cyanosis  Cardiovascular: Negative for chest pain syncope or palpitation  Gastrointestinal: No constipation, nausea, vomiting  Neurological: Negative for headache, positive for:  Psychological: Negative for severe or debilitating anxiety, depression. Negative memory loss  Musculoskeletal: Positive for  Endocrine: Negative for weight gain, appetite changes, excessive sweating  Allergy/immune: Negative    All 13 systems were reviewed and are within normal levels except as noted or in the history of present illness.  Positive or pertinent negative responses are noted or were in the history of present illness. As noted, the patient denies significant or impairing weakness in the bilateral upper and lower extremities, medication induced constipation, and bowel or bladder incontinence.     Current Outpatient Medications:     aspirin 81 mg EC tablet, Take 1 tablet (81 mg) by mouth every 12 hours., Disp: , Rfl:     baclofen (Lioresal) 10 mg tablet, Take 0.5 tablets (5 mg) by mouth once daily at bedtime., Disp: ,  Rfl:     buPROPion XL (Wellbutrin XL) 300 mg 24 hr tablet, Take 1 tablet (300 mg) by mouth once daily., Disp: , Rfl:     cholecalciferol (Vitamin D-3) 50 MCG (2000 UT) tablet, Take 1 tablet (2,000 Units) by mouth once daily., Disp: , Rfl:     cyanocobalamin (Vitamin B-12) 100 mcg tablet, Take 1 tablet (100 mcg) by mouth once daily., Disp: , Rfl:     dapagliflozin propanediol (Farxiga) 10 mg, Take 1 tablet (10 mg) by mouth once daily., Disp: 90 tablet, Rfl: 3    diazePAM (Valium) 10 mg tablet, Take 1 tablet (10 mg) by mouth every 12 hours if needed., Disp: , Rfl:     diroximel fumarate (Vumerity) 231 mg capsule,delayed release(DR/EC), Take 231 mg by mouth 2 times a day. Take 2 tabs BID, Disp: , Rfl:     DULoxetine (Cymbalta) 60 mg DR capsule, Take 1 capsule (60 mg) by mouth once daily., Disp: , Rfl:     estradiol (Estrace) 0.01 % (0.1 mg/gram) vaginal cream, Place a dime sized amount vaginally  3 times a week, Disp: 42.5 g, Rfl: 3    fluticasone (Flonase) 50 mcg/actuation nasal spray, Administer 2 sprays into each nostril once daily., Disp: , Rfl:     hydroxychloroquine (Plaquenil) 200 mg tablet, Take 2 tablets (400 mg) by mouth once daily., Disp: , Rfl:     hyoscyamine ER (Levbid) 0.375 mg 12 hr tablet, Take 1 tablet (0.375 mg) by mouth once daily., Disp: , Rfl:     levothyroxine (Synthroid, Levoxyl) 50 mcg tablet, Take 1 tablet (50 mcg) by mouth once daily., Disp: , Rfl:     losartan (Cozaar) 100 mg tablet, Take 1 tablet (100 mg) by mouth once daily., Disp: , Rfl:     metoprolol succinate XL (Toprol-XL) 200 mg 24 hr tablet, Take 1 tablet (200 mg) by mouth once daily., Disp: , Rfl:     modafinil (Provigil) 200 mg tablet, Take 1 tablet (200 mg) by mouth twice a day., Disp: 60 tablet, Rfl: 2    multivit-min-iron-FA-vit K-lut (Centrum Silver Women) 8 mg iron-400 mcg-50 mcg tablet, Take 1 tablet by mouth once daily., Disp: , Rfl:     omeprazole (PriLOSEC) 40 mg DR capsule, Take 1 capsule (40 mg) by mouth once daily in  the morning. Take before meals., Disp: , Rfl:     potassium chloride (Klor-Con) 20 mEq packet, Take 20 mEq by mouth., Disp: , Rfl:     pregabalin (Lyrica) 100 mg capsule, Take 2 capsules (200 mg) by mouth once daily., Disp: 60 capsule, Rfl: 2    spironolactone (Aldactone) 25 mg tablet, Take 1 tablet (25 mg) by mouth once daily., Disp: , Rfl:     tiZANidine (Zanaflex) 2 mg tablet, TAKE 1 TABLET BY MOUTH EVERY 8 HOURS AS NEEDED (caution for sedation), Disp: 180 tablet, Rfl: 1    torsemide (Demadex) 20 mg tablet, Take 1 tablet (20 mg) by mouth once daily., Disp: , Rfl:     vibegron 75 mg tablet, Take 1 tablet (75 mg) by mouth once daily., Disp: 30 tablet, Rfl: 11     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    Abnormal serum cholesterol    Disorder of pigmentation, unspecified 03/22/2022    Discoloration of skin of toe    Dizziness and giddiness 07/01/2022    Dizziness    Dorsalgia, unspecified 12/30/2022    Back pain    Edema, unspecified 07/17/2019    Edema    Encounter for fitting and adjustment of other specified devices 01/19/2021    Fitting and adjustment of pessary    Encounter for general adult medical examination without abnormal findings 03/22/2022    Encounter for Medicare annual wellness exam    Encounter for gynecological examination (general) (routine) without abnormal findings 12/05/2018    Visit for gynecologic examination    Encounter for gynecological examination (general) (routine) without abnormal findings 03/17/2016    Encounter for gynecological examination without  abnormal finding    Encounter for other screening for malignant neoplasm of breast 2019    Encounter for screening for malignant neoplasm of breast    Encounter for other screening for malignant neoplasm of breast 2022    Breast screening    Fibromyalgia 2022    Fibromyalgia    Hyperlipidemia     Hypertension     Hypothyroidism, unspecified 2022    Hypothyroidism    Local infection of the skin and subcutaneous tissue, unspecified 2019    Toe infection    Multiple diaphyseal sclerosis     Multiple sclerosis (CMS/[facility]) 2022    Multiple sclerosis    Obesity, unspecified 2016    Mild obesity    Other age-related incipient cataract, bilateral 2022    Other age-related incipient cataract of both eyes    Other chronic pain 2022    Chronic pain    Other specified disorders of urethra 2020    Prolapse of urethra    Pain in right knee 2022    Right knee pain    Peripheral vascular disease, unspecified (CMS/[facility]ly [facility]) 2022    Arterial insufficiency of lower extremity    Personal history of other diseases of urinary system 2020    History of prolapse of bladder    Personal history of other endocrine, nutritional and metabolic disease 2014    History of hypothyroidism    Personal history of other specified conditions 2022    History of chronic cough    Sciatica, right side 2019    Right sciatic nerve pain    Sleep apnea, unspecified 04/10/2017    Sleep apnea    Torticollis 2022    Torticollis    Tremor, unspecified 2022    Tremor of both hands    Unspecified cataract     Cataracts, both eyes    Unspecified skin changes 2022    Skin change    Unspecified symptoms and signs involving the genitourinary system 2021    Urinary symptom or sign        Past Surgical History:   Procedure Laterality Date     SECTION, CLASSIC  10/03/2018     Section    GALLBLADDER SURGERY  2020    Gallbladder Surgery     OTHER SURGICAL HISTORY  11/16/2021    Shoulder replacement    OTHER SURGICAL HISTORY  07/17/2020    Exploratory laparotomy    OTHER SURGICAL HISTORY  11/04/2020    Urethroplasty        Family History   Problem Relation Name Age of Onset    Diabetes Mother      Hypertension Mother      Skin cancer Mother      Other (HEPATIC CIRRHOSIS [Other]) Father      No Known Problems Sister      Other (AMD) Brother      Retinal detachment Daughter      Retinal detachment Son      Cervical cancer Mother's Sister      Cervical cancer Maternal Grandmother          Allergies   Allergen Reactions    Naltrexone Unknown    Narcan [Naloxone] Unknown    Penicillin Unknown        Objective     Visit Vitals  Smoking Status Never        Physical Exam    PE:  General: Well-developed, well-nourished, no acute distress. The patient demonstrates no pain behavior, symptom magnification or overt drug-seeking behavior.  Eye: Pupils appropriate for room lighting  Neck/thyroid: No obvious goiter or enlargement of neck noted  Respiratory exam: Normal respiratory effort, unlabored respiration. No accessory muscle use noted  Cardiac exam: Bilateral radial pulses intact  Abdominal: Nondistended  Spine, lumbar: The patient is able to rise from a seated to standing position without hesitancy, push off, or delay. Gait is grossly nonantalgic. Tenderness to paraspinous musculature is noted  Neurologic exam: Muscle strength is antigravity in all 4 extremities.  Psychiatric exam: Judgment and insight normal, affect normal, speech is fluent, affect appropriate, demonstrating no signs of hypersomnolence, sedation, or confusion        Physical exam as above except:        Assessment/Plan   {Assess/PlanSmartLinks:12900}

## 2024-02-12 NOTE — PROGRESS NOTES
Subjective   Patient ID: Jes Roper is a 69 y.o. female who presents for Arthritis and Knee Pain.  HPI  69-year-old female with past medical history that is significant for widespread myalgia, polyarticular pain and multiple sclerosis.  She presented at her last office visit with most significant pain to her bilateral knees attributed to severe osteoarthritis.  She has tried corticosteroid injections in the past with the first injection providing excellent relief and repeat injections providing limited relief.  She has tried physical therapy multiple times in the past and has done well with aqua therapy.  Patient was scheduled for genicular nerve block bilaterally with progression to bilateral RFA if successful.  Patient also has cervical as well as lumbar pain.  She was scheduled for a cervical and lumbar MRI by her neurologist.  Patient presents at today's office visit with widespread myalgia and polyarticular pain which she states is most bothersome to her bilateral knees.  Patient states that pain moves from site to site and is most bothersome in her lower extremities.  She is experiencing low back pain which she states intermittently will radiate to her lower extremities.  She does have chronic numbness and tingling as well as weakness to lower extremities which she attributes partially to her MS.  She is experiencing cervical pain which radiates to her shoulders and occasionally upper extremities she also has some numbness and tingling weakness to upper extremities again which she attributes partially to her MS.  Patient proceeded with cervical and lumbar MRI as ordered by neurology.  Cervical MRI consistent with lumbar degenerative changes most prominent at L4-5 with severe spinal canal and moderate neuroforaminal stenosis.  Cervical MRI consistent with no severe canal or cord compression.  Patient followed up with her neurologist who has referred her to a neurosurgeon.  She has an appointment  scheduled for 2/22/2024 with neurosurgery.  Patient proceeded with diagnostic genicular nerve block bilateral knees on 1/26/2024 which provided 100% relief lasting several hours.  Patient is considering moving forward with bilateral genicular RFA, however, she would like to be evaluated by neurosurgery before she commits to this procedure.  Managing her pain with duloxetine and Lyrica per rheumatology.  She is also taking baclofen and tizanidine for muscle tightness and spasm.  She states she does not take them at the same time.  OARRS:  No data recorded  I have personally reviewed the OARRS report for Jes Roper. I have considered the risks of abuse, dependence, addiction and diversion    Is the patient prescribed a combination of a benzodiazepine and opioid?  No    Last Urine Drug Screen / ordered today: No  Recent Results (from the past 8760 hour(s))   Amphetamine Confirm, Urine    Collection Time: 03/15/23 10:52 AM   Result Value Ref Range    Amphetamines,Urine <50 ng/mL    MDA, Urine <200 ng/mL    MDEA, Urine <200 ng/mL    MDMA, Urine <200 ng/mL    Methamphetamine Quant, Ur <200 ng/mL    Phentermine,Urine <200 ng/mL   OPIATE/OPIOID/BENZO PRESCRIPTION COMPLIANCE    Collection Time: 03/15/23 10:52 AM   Result Value Ref Range    DRUG SCREEN COMMENT URINE SEE BELOW     Creatine, Urine 175.3 mg/dL    Amphetamine Screen, Urine PRESUMPTIVE POSITIVE (A) NEGATIVE    Barbiturate Screen, Urine PRESUMPTIVE NEGATIVE NEGATIVE    Cannabinoid Screen, Urine PRESUMPTIVE NEGATIVE NEGATIVE    Cocaine Screen, Urine PRESUMPTIVE NEGATIVE NEGATIVE    PCP Screen, Urine PRESUMPTIVE NEGATIVE NEGATIVE    7-Aminoclonazepam <25 Cutoff <25 ng/mL    Alpha-Hydroxyalprazolam <25 Cutoff <25 ng/mL    Alpha-Hydroxymidazolam <25 Cutoff <25 ng/mL    Alprazolam <25 Cutoff <25 ng/mL    Chlordiazepoxide <25 Cutoff <25 ng/mL    Clonazepam <25 Cutoff <25 ng/mL    Diazepam <25 Cutoff <25 ng/mL    Lorazepam <25 Cutoff <25 ng/mL    Midazolam <25  Cutoff <25 ng/mL    Nordiazepam 50 (A) Cutoff <25 ng/mL    Oxazepam 315 (A) Cutoff <25 ng/mL    Temazepam 263 (A) Cutoff <25 ng/mL    Zolpidem <25 Cutoff <25 ng/mL    Zolpidem Metabolite (ZCA) <25 Cutoff <25 ng/mL    6-Acetylmorphine <25 Cutoff <25 ng/mL    Codeine <50 Cutoff <50 ng/mL    Hydrocodone <25 Cutoff <25 ng/mL    Hydromorphone <25 Cutoff <25 ng/mL    Morphine Urine <50 Cutoff <50 ng/mL    Norhydrocodone <25 Cutoff <25 ng/mL    Noroxycodone <25 Cutoff <25 ng/mL    Oxycodone <25 Cutoff <25 ng/mL    Oxymorphone <25 Cutoff <25 ng/mL    Tramadol <50 Cutoff <50 ng/mL    O-Desmethyltramadol <50 Cutoff <50 ng/mL    Fentanyl <2.5 Cutoff<2.5 ng/mL    Norfentanyl <2.5 Cutoff<2.5 ng/mL    METHADONE CONFIRMATION,URINE <25 Cutoff <25 ng/mL    EDDP <25 Cutoff <25 ng/mL     N/A    Controlled Substance Agreement:  Date of the Last Agreement:       Monitoring and compliance:    ORT: NA    PDUQ: NA    Office Agreement:      Review of Systems    ROS:   General: No fevers, chills, weight loss  Skin: Negative for lesions  Eyes: No acute vision changes  Ears: No vertigo  Nose, mouth, throat: No difficulty swallowing or speaking  Respiratory: No cough, shortness of breath, cyanosis  Cardiovascular: Negative for chest pain syncope or palpitation  Gastrointestinal: No constipation, nausea, vomiting  Neurological: Negative for headache, positive for: Chronic paresthesia and weakness  Psychological: Negative for severe or debilitating anxiety, depression. Negative memory loss  Musculoskeletal: Positive for arthralgia, myalgia, pain and spasm  Endocrine: Negative for weight gain, appetite changes, excessive sweating  Allergy/immune: Negative    All 13 systems were reviewed and are within normal levels except as noted or in the history of present illness.  Positive or pertinent negative responses are noted or were in the history of present illness. As noted, the patient denies significant or impairing weakness in the bilateral upper  and lower extremities, medication induced constipation, and bowel or bladder incontinence.     Current Outpatient Medications:     aspirin 81 mg EC tablet, Take 1 tablet (81 mg) by mouth every 12 hours., Disp: , Rfl:     baclofen (Lioresal) 10 mg tablet, Take 0.5 tablets (5 mg) by mouth once daily at bedtime., Disp: , Rfl:     buPROPion XL (Wellbutrin XL) 300 mg 24 hr tablet, Take 1 tablet (300 mg) by mouth once daily., Disp: , Rfl:     cholecalciferol (Vitamin D-3) 50 MCG (2000 UT) tablet, Take 1 tablet (2,000 Units) by mouth once daily., Disp: , Rfl:     cyanocobalamin (Vitamin B-12) 100 mcg tablet, Take 1 tablet (100 mcg) by mouth once daily., Disp: , Rfl:     dapagliflozin propanediol (Farxiga) 10 mg, Take 1 tablet (10 mg) by mouth once daily., Disp: 90 tablet, Rfl: 3    diazePAM (Valium) 10 mg tablet, Take 1 tablet (10 mg) by mouth once daily as needed for muscle spasms., Disp: , Rfl:     diroximel fumarate (Vumerity) 231 mg capsule,delayed release(DR/EC), Take 231 mg by mouth 2 times a day. Take 2 tabs BID, Disp: , Rfl:     DULoxetine (Cymbalta) 60 mg DR capsule, Take 1 capsule (60 mg) by mouth once daily., Disp: , Rfl:     estradiol (Estrace) 0.01 % (0.1 mg/gram) vaginal cream, Place a dime sized amount vaginally  3 times a week, Disp: 42.5 g, Rfl: 3    fluticasone (Flonase) 50 mcg/actuation nasal spray, Administer 2 sprays into each nostril once daily., Disp: , Rfl:     hydroxychloroquine (Plaquenil) 200 mg tablet, Take 2 tablets (400 mg) by mouth once daily., Disp: , Rfl:     hyoscyamine ER (Levbid) 0.375 mg 12 hr tablet, Take 1 tablet (0.375 mg) by mouth once daily., Disp: , Rfl:     levothyroxine (Synthroid, Levoxyl) 50 mcg tablet, Take 1 tablet (50 mcg) by mouth once daily., Disp: , Rfl:     losartan (Cozaar) 100 mg tablet, Take 1 tablet (100 mg) by mouth once daily., Disp: , Rfl:     metoprolol succinate XL (Toprol-XL) 200 mg 24 hr tablet, Take 1 tablet (200 mg) by mouth once daily., Disp: , Rfl:      multivit-min-iron-FA-vit K-lut (Centrum Silver Women) 8 mg iron-400 mcg-50 mcg tablet, Take 1 tablet by mouth once daily., Disp: , Rfl:     omeprazole (PriLOSEC) 40 mg DR capsule, Take 1 capsule (40 mg) by mouth once daily in the morning. Take before meals., Disp: , Rfl:     potassium chloride (Klor-Con) 20 mEq packet, Take 20 mEq by mouth., Disp: , Rfl:     pregabalin (Lyrica) 100 mg capsule, Take 2 capsules (200 mg) by mouth once daily., Disp: 60 capsule, Rfl: 2    spironolactone (Aldactone) 25 mg tablet, Take 1 tablet (25 mg) by mouth once daily., Disp: , Rfl:     tiZANidine (Zanaflex) 2 mg tablet, TAKE 1 TABLET BY MOUTH EVERY 8 HOURS AS NEEDED (caution for sedation), Disp: 180 tablet, Rfl: 1    torsemide (Demadex) 20 mg tablet, Take 1 tablet (20 mg) by mouth once daily., Disp: , Rfl:     vibegron 75 mg tablet, Take 1 tablet (75 mg) by mouth once daily., Disp: 30 tablet, Rfl: 11    modafinil (Provigil) 200 mg tablet, Take 1 tablet (200 mg) by mouth twice a day., Disp: 60 tablet, Rfl: 2     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    Abnormal serum cholesterol    Disorder of pigmentation, unspecified 03/22/2022    Discoloration of skin of toe    Dizziness and giddiness 07/01/2022    Dizziness    Dorsalgia, unspecified 12/30/2022    Back pain    Edema, unspecified 07/17/2019    Edema    Encounter for fitting and adjustment of other specified devices 01/19/2021    Fitting and adjustment of pessary    Encounter for general adult medical examination without abnormal findings 03/22/2022     Encounter for Medicare annual wellness exam    Encounter for gynecological examination (general) (routine) without abnormal findings 12/05/2018    Visit for gynecologic examination    Encounter for gynecological examination (general) (routine) without abnormal findings 03/17/2016    Encounter for gynecological examination without abnormal finding    Encounter for other screening for malignant neoplasm of breast 11/01/2019    Encounter for screening for malignant neoplasm of breast    Encounter for other screening for malignant neoplasm of breast 03/22/2022    Breast screening    Fibromyalgia 09/29/2022    Fibromyalgia    Hyperlipidemia     Hypertension     Hypothyroidism, unspecified 03/22/2022    Hypothyroidism    Local infection of the skin and subcutaneous tissue, unspecified 02/22/2019    Toe infection    Multiple diaphyseal sclerosis     Multiple sclerosis (CMS/[facility]) 12/06/2022    Multiple sclerosis    Obesity, unspecified 11/21/2016    Mild obesity    Other age-related incipient cataract, bilateral 09/22/2022    Other age-related incipient cataract of both eyes    Other chronic pain 07/01/2022    Chronic pain    Other specified disorders of urethra 09/08/2020    Prolapse of urethra    Pain in right knee 06/22/2022    Right knee pain    Peripheral vascular disease, unspecified (CMS/[facility]) 04/07/2022    Arterial insufficiency of lower extremity    Personal history of other diseases of urinary system 05/06/2020    History of prolapse of bladder    Personal history of other endocrine, nutritional and metabolic disease 06/11/2014    History of hypothyroidism    Personal history of other specified conditions 12/30/2022    History of chronic cough    Sciatica, right side 08/06/2019    Right sciatic nerve pain    Sleep apnea, unspecified 04/10/2017    Sleep apnea    Torticollis 03/22/2022    Torticollis    Tremor, unspecified 09/22/2022    Tremor of both hands    Unspecified cataract     Cataracts, both eyes     Unspecified skin changes 2022    Skin change    Unspecified symptoms and signs involving the genitourinary system 2021    Urinary symptom or sign        Past Surgical History:   Procedure Laterality Date     SECTION, CLASSIC  10/03/2018     Section    GALLBLADDER SURGERY  2020    Gallbladder Surgery    OTHER SURGICAL HISTORY  2021    Shoulder replacement    OTHER SURGICAL HISTORY  2020    Exploratory laparotomy    OTHER SURGICAL HISTORY  2020    Urethroplasty        Family History   Problem Relation Name Age of Onset    Diabetes Mother      Hypertension Mother      Skin cancer Mother      Other (HEPATIC CIRRHOSIS [Other]) Father      No Known Problems Sister      Other (AMD) Brother      Retinal detachment Daughter      Retinal detachment Son      Cervical cancer Mother's Sister      Cervical cancer Maternal Grandmother          Allergies   Allergen Reactions    Naltrexone Unknown    Narcan [Naloxone] Unknown    Penicillin Unknown        Objective     Visit Vitals  BP 95/55   Pulse 75   Resp 16 Comment: 02 98   Ht 1.524 m (5')   Wt 84.4 kg (186 lb 1.6 oz)   BMI 36.35 kg/m²   Smoking Status Never   BSA 1.89 m²        Physical Exam    PE:  General: Well-developed, well-nourished, no acute distress. The patient demonstrates no pain behavior, symptom magnification or overt drug-seeking behavior.  Eye: Pupils appropriate for room lighting  Neck/thyroid: No obvious goiter or enlargement of neck noted  Respiratory exam: Normal respiratory effort, unlabored respiration. No accessory muscle use noted  Cardiac exam: Bilateral radial pulses intact  Abdominal: Nondistended  Spine, cervical: Tenderness to paraspinous musculature paracervical region.  Flexion and extension intact with extension increasing pain.  Spine, lumbar: The patient is able to rise from a seated to standing position without hesitancy, push off, or delay. Gait is slow and deliberate.  Posture is forward  leaning.  Ambulates with a rollator. Tenderness to paraspinous musculature is noted lower lumbar spine.  Flexion intact with extension increasing pain.  Negative straight leg raise.  Ortho: Knee: Pain medial aspect bilateral knees.  Positive crepitus.  Negative effusion.  Pain with active/passive range of motion bilateral knees.  Neurologic exam: Muscle strength is antigravity in all 4 extremities.  Equal muscle strength bilateral lower extremities.  Psychiatric exam: Judgment and insight normal, affect normal, speech is fluent, affect appropriate, demonstrating no signs of hypersomnolence, sedation, or confusion        Assessment/Plan   Problem List Items Addressed This Visit             ICD-10-CM    Primary osteoarthritis of both knees M17.0     69-year-old female with previous medical history consistent with multiple sclerosis, widespread myalgia as well as polyarticular pain.  Patient also experiencing cervical as well as lumbar radicular symptoms.  Patient presented at her last office visit  most bothered by bilateral knee pain.  She continues to experience knee pain as well as cervical and lumbar pain.  She does endorse numbness/tingling and weakness which is unchanged from prior exam.  She denies any bowel/bladder dysfunction.  Patient has had previous falls and is unsure if this is attributed to her knee pain/instability or MS.  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  Cervical MRI did not indicate any severe canal or cord compression.  She has been referred to a neurosurgeon by her neurologist and is scheduled for evaluation on 2/22/2022 for further recommendations.  She proceeded with bilateral genicular nerve blocks which provided 100% relief for several hours.  She is unsure if she would like to proceed with genicular RFA at this time.  She would like evaluation by neurosurgery before  proceeding with radiofrequency ablation.  Once she has been evaluated by neurosurgery and they have discussed treatment recommendations she may contact our office if she would like to proceed with radiofrequency ablation.  She would like to restart physical therapy as she felt that aqua therapy was very helpful.  She will discuss this further with her neurosurgeon.  She is continuing to benefit from duloxetine and Lyrica as prescribed by her rheumatologist.  Continued benefit with baclofen for muscle tightness and spasm.  Advised patient to limit use of tizanidine secondary to low blood pressure and potential for hypotension with this medication.  Pain reviewed with patient at today's office visit.    -Patient will proceed with neurosurgery evaluation and review of recent cervical and lumbar MRI.  -She may resume aqua therapy after evaluation by neurosurgery.  -Patient will contact our office if she wants to proceed with genicular RFA since the patient had successful diagnostic genicular nerve blocks bilaterally.  She will contact our office after evaluation by neurosurgery.  If patient would like to proceed with genicular RFA we will schedule approximately 3 weeks apart.           Generalized osteoarthritis of multiple sites M15.9    Arthritis of knee - Primary M17.10    Cervical neuritis M54.12     OSWESTRY SCORE 62

## 2024-02-17 DIAGNOSIS — E03.9 HYPOTHYROIDISM, UNSPECIFIED: ICD-10-CM

## 2024-02-19 DIAGNOSIS — M19.90 INFLAMMATORY ARTHRITIS: Primary | ICD-10-CM

## 2024-02-19 RX ORDER — LEVOTHYROXINE SODIUM 50 UG/1
50 TABLET ORAL DAILY
Qty: 90 TABLET | Refills: 1 | Status: SHIPPED | OUTPATIENT
Start: 2024-02-19

## 2024-02-19 RX ORDER — HYDROXYCHLOROQUINE SULFATE 200 MG/1
400 TABLET, FILM COATED ORAL
Qty: 180 TABLET | Refills: 3 | Status: SHIPPED | OUTPATIENT
Start: 2024-02-19 | End: 2025-02-18

## 2024-02-20 ENCOUNTER — TELEMEDICINE (OUTPATIENT)
Dept: UROLOGY | Facility: CLINIC | Age: 69
End: 2024-02-20
Payer: COMMERCIAL

## 2024-02-20 ENCOUNTER — TELEPHONE (OUTPATIENT)
Dept: CARDIOLOGY | Facility: HOSPITAL | Age: 69
End: 2024-02-20

## 2024-02-20 DIAGNOSIS — G35 MULTIPLE SCLEROSIS (MULTI): ICD-10-CM

## 2024-02-20 DIAGNOSIS — R31.21 ASYMPTOMATIC MICROSCOPIC HEMATURIA: Primary | ICD-10-CM

## 2024-02-20 DIAGNOSIS — N95.2 VAGINAL ATROPHY: ICD-10-CM

## 2024-02-20 DIAGNOSIS — Z46.89 PESSARY MAINTENANCE: ICD-10-CM

## 2024-02-20 DIAGNOSIS — I50.22 CHRONIC SYSTOLIC HEART FAILURE (MULTI): ICD-10-CM

## 2024-02-20 DIAGNOSIS — Z96.0 PRESENCE OF PESSARY: ICD-10-CM

## 2024-02-20 DIAGNOSIS — R06.09 DYSPNEA ON EXERTION: ICD-10-CM

## 2024-02-20 DIAGNOSIS — N39.41 URGE URINARY INCONTINENCE: ICD-10-CM

## 2024-02-20 DIAGNOSIS — N39.3 FEMALE STRESS INCONTINENCE: ICD-10-CM

## 2024-02-20 PROCEDURE — 1159F MED LIST DOCD IN RCRD: CPT | Performed by: OBSTETRICS & GYNECOLOGY

## 2024-02-20 PROCEDURE — 99442 PR PHYS/QHP TELEPHONE EVALUATION 11-20 MIN: CPT | Performed by: OBSTETRICS & GYNECOLOGY

## 2024-02-20 PROCEDURE — 1126F AMNT PAIN NOTED NONE PRSNT: CPT | Performed by: OBSTETRICS & GYNECOLOGY

## 2024-02-20 PROCEDURE — 1160F RVW MEDS BY RX/DR IN RCRD: CPT | Performed by: OBSTETRICS & GYNECOLOGY

## 2024-02-20 PROCEDURE — 1036F TOBACCO NON-USER: CPT | Performed by: OBSTETRICS & GYNECOLOGY

## 2024-02-20 PROCEDURE — 4010F ACE/ARB THERAPY RXD/TAKEN: CPT | Performed by: OBSTETRICS & GYNECOLOGY

## 2024-02-20 RX ORDER — SPIRONOLACTONE 25 MG/1
25 TABLET ORAL DAILY
Qty: 90 TABLET | Refills: 3 | Status: SHIPPED | OUTPATIENT
Start: 2024-02-20

## 2024-02-20 NOTE — PROGRESS NOTES
"Subjective   Patient ID: Jes Roper is a 69 y.o. female who presents for follow up.  HPI  This visit was performed through telemedicine  68-year-old with a history of a #4 incontinence ring pessary with history of genuine stress urinary incontinence having undergone 10/19/2020 excision of urethral polyp and cystoscopy for history of urinary urgency, stress incontinence, vaginal atrophy with urethral polyp, multiple sclerosis, pelvic floor weakness, and constipation with urinary urgency and frequency with a  #3 incontinence dish fitted 1/8/2024.     The patient had a sensation of pressure and \"like  was trying to push something out\" after she left the office after her  #3 incontinence dish was fitted 1/8/2024. She took tylenol and the discomfort resolved.  She is overall satisfied with her pessary at this time.    She has been utilizing Gemtesa with significant benefits, she denies any urgency complaints. The medication however is $95 for a 30-day supply.     She denies any vaginal complaints, no abnormal bleeding or discharge.     She denies any bowel related complaints, no fecal or flatal incontinence.    She has no other complaints.    From Previous note  68-year-old with #4 incontinence ring pessary with history of genuine stress urinary incontinence having undergone 10/19/2020 excision of urethral polyp and cystoscopy for history of urinary urgency, stress incontinence, vaginal atrophy with urethral polyp, multiple sclerosis, pelvic floor weakness, and constipation with urinary urgency and frequency.     The patient did not note any changes in her LUTS with the pessary out. She notes worsening urinary urgency and frequency when she takes her diuretics. She notes daytime frequency associated with her diuretic use. She notes 1-2 episodes of nocturia. Gemtesa samples were provided to her. She denies any UTI like symptoms.     She denies any vaginal complaints, no abnormal bleeding or discharge.     She " "denies any bowel related complaints, no fecal or flatal incontinence.    She has no other complaints.      From Previous note  68-year-old with #4 incontinence ring pessary with history of genuine stress urinary incontinence having undergone 10/19/2020 excision of urethral polyp and cystoscopy for history of urinary urgency, stress incontinence, vaginal atrophy with urethral polyp, multiple sclerosis, pelvic floor weakness, and constipation with urinary urgency and frequency.     The patient required to have an MRI and was unable to proceed due to the pessary. She presents to get the pessary removed to be able to have the MRI.     She has a fall 10/17 and is noting worsening MS symptoms since then.     She notes worsening urinary urgency and frequency when she takes her diuretics. She notes daytime frequency associated with her diuretic use. She notes 1-2 episodes of nocturia.    She denies any vaginal complaints, no abnormal bleeding or discharge.     She denies any bowel related complaints, no fecal or flatal incontinence.    She has no other complaints.    From Previous note  68-year-old with #4 incontinence ring pessary with history of genuine stress urinary incontinence having undergone 10/19/2020 excision of urethral polyp and cystoscopy for history of urinary urgency, stress incontinence, vaginal atrophy with urethral polyp, multiple sclerosis, pelvic floor weakness, and constipation with urinary urgency and frequency.     She is overall very satisfied with her pessary. She denies any abnormal vaginal bleeding or discharge. She notes improvement in her stress urinary incontinence complaints.     She is no longer taking Myrbetriq. \"I have a full plate\". She is presently managing her mother who is 98 and on hospice. She also has had recent increase in her diuretic requirements. She notes daytime frequency associated with her diuretic use. She notes 1-2 episodes of nocturia.     She has no other complaints.   " "  From previous note     67-year-old with #4 incontinence ring pessary with history of genuine stress urinary incontinence having undergone 10/19/2020 excision of urethral polyp and cystoscopy for history of urinary urgency, stress incontinence, vaginal atrophy with urethral polyp, multiple sclerosis, pelvic floor weakness, and constipation with urinary urgency and frequency.     The patient was last seen in December 2022. The patient has not started taking the Myrbetriq as it was cost prohibitive for her. She is switching insurance and will contact again after she is established. She continues to note urinary urgency and incontinence complaints. She continues to note rare stress urinary incontinence and states that she \" some days when its under control and some days she cannot move without leaking\". She denies any UTI like symptoms. She is satisfied from the pessary standpoint.      She denies any bowel related complaints, no fecal or flatal incontinence.     She denies any vaginal complaints, no abnormal vaginal bleeding or discharge. She is utilizing the vaginal estrogen cream.      She has no other complaints.   From previous note  67-year-old with #4 incontinence ring pessary with history of genuine stress urinary incontinence having undergone 10/19/2020 excision of urethral polyp and cystoscopy for history of urinary urgency, stress incontinence, vaginal atrophy with urethral polyp, multiple sclerosis, pelvic floor weakness, and constipation with urinary urgency and frequency presenting today for pessary maintenance.     The patient state she continues to note rare stress urinary incontinence. She is overall satisfied with her incontinence ring. She denies any vaginal complaints. She denies any UTI-like symptoms. He does however note episodic urinary urgency, frequency, and incontinence. \"It comes in waves\". She wishes to proceed with Myrbetriq in 2023.     She denies any bowel related complaints, no fecal or " "flatal incontinence.     She denies any vaginal complaints, no abnormal vaginal bleeding or discharge.     She has no other complaints.      From previous note   67-year-old with history of MS with mixed urinary incontinence and #4 incontinence ring pessary having undergone 10/19/2020 excision of urethral polyp and cystoscopy for history of urethral polyp presenting for pessary follow-up.     She is overall very satisfied with her pessary ring. She denies any UTI-like symptoms. She feels that she is emptying her bladder appropriately. However she has noted an episode of vaginal spotting over the last week. She otherwise denies any abnormal vaginal discharge.     At her last appointment, we discussed her concerns for several episodes of urinary incontinence. However it was unclear whether this was urine or vaginal discharge. She was instructed to proceed with a phenazopyridine challenge. She has been unable to perform this. She does not have any particular lower urinary tract complaints at this time. .      She has a \"very full plate\" as her 97-year-old mother recently moved in. She has not had the opportunity to \"really think about\" her lower urinary tract symptoms.     She has no other complaints.     From previous note  66-year-old presenting for incontinence ring pessary follow-up after 10/19/2020 excision of urethral polyp and cystoscopy for history of urethral polyp, urinary urgency, female stress incontinence, and vaginal atrophy with history of MS.     The patient was refitted with a #4 incontinence ring at her last appointment. She is overall very satisfied with this. She feels that this has significantly improved her urinary leakage as well as her urgency complaints. She wishes to continue this therapy moving forward.     She denies any UTI-like symptoms.     She has picked up her vaginal estrogen therapy but has not begun this.     She has no other complaints.     From previous note  65-year-old with " urethral polyp, urinary urgency, and vaginal atrophy.     The patient continues to utilize her vaginal estrogen therapy. She has not noted any improvement in her urethral polyp complaints. She denies any gross hematuria. She denies any significant changes in her urinary urgency.     She wishes to proceed with definitive surgical management.     She has no other complaints.     From previous note  65-year-old presenting as a referral from Dr. Carvalho with complaints of a urethral mass, gross hematuria, and urinary urgency.     The patient was originally evaluated in March with complaints of vaginal bleeding. She was noted to have urethral prolapse and vaginal atrophy and started on vaginal estrogen cream. Upon reevaluation her prolapse had improved but upon evaluation by Dr. Carvalho was found to have a urethral polyp. Cystoscopy at that time was normal and upper tract imaging demonstrated nonobstructive bilateral nephrolithiasis.     The patient continues to note episodic spotting when wiping. She does note urinary urgency and frequency but denies any urge related incontinence. She does note rare stress urinary incontinence. She notes nocturia up to 3 times. She denies enuresis. She denies a history of chronic urinary tract infections.     She is not sexually active. She does suffer from MS. She denies any vaginal bulge complaints. She denies any abnormal vaginal discharge.     She has episodic diarrhea and constipation. She denies any fecal or flatal incontinence.     She has no other complaints.   Review of Systems  Constitutional: No fever, No chills and No fatigue.   Eyes: No vision problems and No dryness of the eyes.   ENT: No dry mouth, No hearing loss and No nosebleeds.   Cardiovascular: No chest pain, No palpitations and No orthopnea.   Respiratory: No shortness of breath, No cough and No wheezing.   Gastrointestinal: No abdominal pain, No constipation, No nausea, No diarrhea, No vomiting and No melena.    Genitourinary: As noted in HPI.   Musculoskeletal: No back pain, No myalgias, No muscle weakness, No joint swelling and No leg edema.   Integumentary: No rashes, No skin lesion and No itching.   Neurological: No headache, No numbness and No dizziness.   Psychiatric: No sleep disturbances, No anxiety and No depression.   Endocrine: No hot flashes, No loss of hair and No hirsutism.   Hematologic/Lymphatic: No swollen glands, No tendency for easy bleeding and No tendency for easy bruising.   All other systems have been reviewed and are negative for complaint.        Objective   Physical Exam    This visit was performed through telemedicine     Assessment/Plan   68-year-old with a history of a #4 incontinence ring pessary with history of genuine stress urinary incontinence having undergone 10/19/2020 excision of urethral polyp and cystoscopy for history of urinary urgency, stress incontinence, vaginal atrophy with urethral polyp, multiple sclerosis, pelvic floor weakness, and constipation with urinary urgency and frequency.     She was replaced with a #3 incontinence dish.  We discussed the importance of continuing vaginal estrogen therapy.  She may benefit from a #2 or #3 incontinence ring in the future.  She was having excellent results with her #4 incontinence ring previously. We have previously discussed the patient's urodynamics. We specifically discussed therapy for her stress urinary incontinence. We have previously discussed pelvic floor physical therapy, pessary management, and definitive management with a mid urethral sling or Bulkamid therapy. She is overall very satisfied with her incontinence ring.we again discussed urodynamics findings noting terminal contraction noted at 300 cc. We again discussed the importance of timed voiding and fluid management strategies. She had no benefits with oxybutynin therapy in the past.  Her daughter is a pharmacist and had asked about Vesicare and we discussed that this  was a medication similar to the oxybutynin she had previously used with minimal benefits.  She does appear to be having excellent results with her Gemtesa but this does appear to be cost prohibitive at $95 a month.  She will contact her insurance company discuss Myrbetriq coverage and whether there is an option for tier exemption or prior authorization.  Given the ineffective nature of anticholinergic therapy, Gemtesa or Myrbetriq are her only viable oral options and she has had significant benefits as noted above with Gemtesa.  We did again discuss at length today PTNS, Botox, and InterStim including the various risks of these procedures.  The patient will contact the clinic in order to discuss what medication she would like called into her pharmacy.     2. We again discussed the patient's constipation. We discussed titrating daily MiraLAX therapy. We discussed the importance of daily fiber therapy. She will continue this moving forward.     #3 she will continue her vaginal estrogen therapy 3 times weekly.     4.  The patient is proceeding with a pelvic MRI in early March and will require her pessary removed.  She states that she is comfortable having this removed but would like me to replace this.    5.  The patient will follow-up in early March for pessary refitting after her MRI.  She will contact the clinic when she has a better understanding of her drug coverage for beta 3 agonist therapy.     ANTWON Pope MD      Scribe Attestation  By signing my name below, Christy MEDINA Scribe attest that this documentation has been prepared under the direction and in the presence of Ajay Pope MD. All medical record entries made by the Scribe were at my direction or personally dictated by me. I have reviewed the chart and agree that the record accurately reflects my personal performance of the history, physical exam, discussion and plan.

## 2024-02-29 ENCOUNTER — LAB (OUTPATIENT)
Dept: LAB | Facility: LAB | Age: 69
End: 2024-02-29
Payer: COMMERCIAL

## 2024-02-29 ENCOUNTER — OFFICE VISIT (OUTPATIENT)
Dept: PRIMARY CARE | Facility: CLINIC | Age: 69
End: 2024-02-29
Payer: COMMERCIAL

## 2024-02-29 VITALS
SYSTOLIC BLOOD PRESSURE: 130 MMHG | BODY MASS INDEX: 34.36 KG/M2 | HEIGHT: 61 IN | DIASTOLIC BLOOD PRESSURE: 75 MMHG | WEIGHT: 182 LBS

## 2024-02-29 DIAGNOSIS — Z00.00 ROUTINE GENERAL MEDICAL EXAMINATION AT A HEALTH CARE FACILITY: ICD-10-CM

## 2024-02-29 DIAGNOSIS — Z12.31 SCREENING MAMMOGRAM FOR BREAST CANCER: ICD-10-CM

## 2024-02-29 DIAGNOSIS — Z79.899 MEDICATION MANAGEMENT: ICD-10-CM

## 2024-02-29 DIAGNOSIS — E78.00 HYPERCHOLESTEROLEMIA: Primary | ICD-10-CM

## 2024-02-29 DIAGNOSIS — Z00.00 MEDICARE ANNUAL WELLNESS VISIT, SUBSEQUENT: ICD-10-CM

## 2024-02-29 DIAGNOSIS — K21.9 GASTROESOPHAGEAL REFLUX DISEASE, UNSPECIFIED WHETHER ESOPHAGITIS PRESENT: ICD-10-CM

## 2024-02-29 DIAGNOSIS — Z12.11 SCREENING FOR MALIGNANT NEOPLASM OF COLON: ICD-10-CM

## 2024-02-29 DIAGNOSIS — G89.29 OTHER CHRONIC PAIN: ICD-10-CM

## 2024-02-29 DIAGNOSIS — G47.30 SLEEP APNEA, UNSPECIFIED TYPE: ICD-10-CM

## 2024-02-29 LAB
AMPHETAMINES UR QL SCN: NORMAL
BARBITURATES UR QL SCN: NORMAL
BZE UR QL SCN: NORMAL
CANNABINOIDS UR QL SCN: NORMAL
CREAT UR-MCNC: 175.7 MG/DL (ref 20–320)
PCP UR QL SCN: NORMAL

## 2024-02-29 PROCEDURE — 80307 DRUG TEST PRSMV CHEM ANLYZR: CPT

## 2024-02-29 PROCEDURE — 80373 DRUG SCREENING TRAMADOL: CPT

## 2024-02-29 PROCEDURE — 1158F ADVNC CARE PLAN TLK DOCD: CPT | Performed by: INTERNAL MEDICINE

## 2024-02-29 PROCEDURE — 80368 SEDATIVE HYPNOTICS: CPT

## 2024-02-29 PROCEDURE — 99214 OFFICE O/P EST MOD 30 MIN: CPT | Performed by: INTERNAL MEDICINE

## 2024-02-29 PROCEDURE — 3078F DIAST BP <80 MM HG: CPT | Performed by: INTERNAL MEDICINE

## 2024-02-29 PROCEDURE — G0439 PPPS, SUBSEQ VISIT: HCPCS | Performed by: INTERNAL MEDICINE

## 2024-02-29 PROCEDURE — 1036F TOBACCO NON-USER: CPT | Performed by: INTERNAL MEDICINE

## 2024-02-29 PROCEDURE — 1159F MED LIST DOCD IN RCRD: CPT | Performed by: INTERNAL MEDICINE

## 2024-02-29 PROCEDURE — 4010F ACE/ARB THERAPY RXD/TAKEN: CPT | Performed by: INTERNAL MEDICINE

## 2024-02-29 PROCEDURE — 3075F SYST BP GE 130 - 139MM HG: CPT | Performed by: INTERNAL MEDICINE

## 2024-02-29 PROCEDURE — 80361 OPIATES 1 OR MORE: CPT

## 2024-02-29 PROCEDURE — 1170F FXNL STATUS ASSESSED: CPT | Performed by: INTERNAL MEDICINE

## 2024-02-29 PROCEDURE — 82570 ASSAY OF URINE CREATININE: CPT

## 2024-02-29 PROCEDURE — 80354 DRUG SCREENING FENTANYL: CPT

## 2024-02-29 PROCEDURE — 1160F RVW MEDS BY RX/DR IN RCRD: CPT | Performed by: INTERNAL MEDICINE

## 2024-02-29 PROCEDURE — 80358 DRUG SCREENING METHADONE: CPT

## 2024-02-29 PROCEDURE — 80365 DRUG SCREENING OXYCODONE: CPT

## 2024-02-29 PROCEDURE — 1123F ACP DISCUSS/DSCN MKR DOCD: CPT | Performed by: INTERNAL MEDICINE

## 2024-02-29 PROCEDURE — 80346 BENZODIAZEPINES1-12: CPT

## 2024-02-29 PROCEDURE — 1126F AMNT PAIN NOTED NONE PRSNT: CPT | Performed by: INTERNAL MEDICINE

## 2024-02-29 RX ORDER — OMEPRAZOLE 40 MG/1
40 CAPSULE, DELAYED RELEASE ORAL
Qty: 90 CAPSULE | Refills: 1 | Status: SHIPPED | OUTPATIENT
Start: 2024-02-29

## 2024-02-29 ASSESSMENT — PATIENT HEALTH QUESTIONNAIRE - PHQ9
2. FEELING DOWN, DEPRESSED OR HOPELESS: NOT AT ALL
1. LITTLE INTEREST OR PLEASURE IN DOING THINGS: NOT AT ALL
SUM OF ALL RESPONSES TO PHQ9 QUESTIONS 1 AND 2: 0
1. LITTLE INTEREST OR PLEASURE IN DOING THINGS: NOT AT ALL
2. FEELING DOWN, DEPRESSED OR HOPELESS: NOT AT ALL
SUM OF ALL RESPONSES TO PHQ9 QUESTIONS 1 AND 2: 0

## 2024-02-29 ASSESSMENT — ACTIVITIES OF DAILY LIVING (ADL)
MANAGING_FINANCES: INDEPENDENT
DOING_HOUSEWORK: NEEDS ASSISTANCE
TAKING_MEDICATION: INDEPENDENT
DRESSING: INDEPENDENT
BATHING: INDEPENDENT
GROCERY_SHOPPING: NEEDS ASSISTANCE

## 2024-02-29 ASSESSMENT — ENCOUNTER SYMPTOMS
DEPRESSION: 0
OCCASIONAL FEELINGS OF UNSTEADINESS: 1
LOSS OF SENSATION IN FEET: 0

## 2024-02-29 NOTE — PROGRESS NOTES
Subjective   Patient ID: Jes Roper is a 69 y.o. female who presents for Annual Exam.    HPI  Patient in for a visit  DR Mead mentioned statins but did not order   Patient comes in for a physical exam last one done over a year ago , doing well over-all with no particular complaints. Also is in for laboratory review and health maintenance update.  Updating family history as well.  Interval event - past medical history, surgical, social, and family history reviewed and updated.  Interval care -  Patient is    up to date with dental care.  Patient does     receive routine vision care.    Also here for [facility] Annual Wellness Visit     Review of Systems  General: Denies fever, chills, night sweats, changes in appetite or weight  ENT: Negative for ear pain, hearing loss, headache, difficulty swallowing, up to date with dental checks   Eyes: Negative for recent visual changes, up to date with eye exams  Dermatologic: Negative for new skin conditions, rash  Respiratory: Negative for paroxysmal nocturnal dyspnea, wheezing,shortness of breath, cough  Cardiovascular: Negative for chest pain, palpitations, or leg swelling  Gastrointestinal: Negative for nausea/vomiting, abdominal pain, changes in bowel habits  Genitourinary: Negative for dysuria, urgency, frequency  URINARY INCONTINENCE   Neurological: Negative for headaches, tremors, dizziness, memory loss, confusion, weakness, paresthesias  Psychiatric: Negative for sleep problems, anxiety, depression, conditions are stable  Endocrine: Negative for heat or cold intolerance, polyuria, polydipsia  Other:All systems have been reviewed and are negative except as previously noted.    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    Abnormal serum cholesterol    Disorder of pigmentation, unspecified 03/22/2022    Discoloration of skin of toe    Dizziness and giddiness 07/01/2022    Dizziness    Dorsalgia, unspecified 12/30/2022    Back pain    Edema, unspecified 07/17/2019    Edema    Encounter for fitting and adjustment of other specified devices 01/19/2021    Fitting and adjustment of pessary    Encounter for general adult medical examination without abnormal findings 03/22/2022    Encounter for Medicare annual wellness exam    Encounter for gynecological examination (general) (routine) without abnormal findings 12/05/2018    Visit for gynecologic examination    Encounter for gynecological examination (general) (routine) without abnormal findings 03/17/2016    Encounter for gynecological examination without abnormal finding    Encounter for other screening for malignant neoplasm of breast 11/01/2019    Encounter for screening for malignant neoplasm of breast    Encounter for other screening for malignant neoplasm of breast 03/22/2022    Breast screening    Fibromyalgia 09/29/2022    Fibromyalgia    Hyperlipidemia     Hypertension     Hypothyroidism, unspecified 03/22/2022    Hypothyroidism    Local infection of the skin and subcutaneous tissue, unspecified 02/22/2019    Toe infection    Multiple diaphyseal sclerosis     Multiple sclerosis (CMS/HCC) 12/06/2022    Multiple sclerosis    Obesity, unspecified 11/21/2016    Mild obesity    Other age-related incipient cataract, bilateral 09/22/2022    Other age-related incipient cataract of both eyes    Other chronic pain 07/01/2022    Chronic pain    Other specified disorders of urethra 09/08/2020    Prolapse of urethra    Pain in right knee 06/22/2022    Right knee pain    Peripheral vascular  disease, unspecified (CMS/[facility]) 2022    Arterial insufficiency of lower extremity    Personal history of other diseases of urinary system 2020    History of prolapse of bladder    Personal history of other endocrine, nutritional and metabolic disease 2014    History of hypothyroidism    Personal history of other specified conditions 2022    History of chronic cough    Sciatica, right side 2019    Right sciatic nerve pain    Sleep apnea, unspecified 04/10/2017    Sleep apnea    Torticollis 2022    Torticollis    Tremor, unspecified 2022    Tremor of both hands    Unspecified cataract     Cataracts, both eyes    Unspecified skin changes 2022    Skin change    Unspecified symptoms and signs involving the genitourinary system 2021    Urinary symptom or sign     Past Surgical History:   Procedure Laterality Date     SECTION, CLASSIC  10/03/2018     Section    GALLBLADDER SURGERY  2020    Gallbladder Surgery    OTHER SURGICAL HISTORY  2021    Shoulder replacement    OTHER SURGICAL HISTORY  2020    Exploratory laparotomy    OTHER SURGICAL HISTORY  2020    Urethroplasty     Social History     Tobacco Use    Smoking status: Never    Smokeless tobacco: Never   Vaping Use    Vaping Use: Never used   Substance Use Topics    Alcohol use: Never    Drug use: Never     Family History   Problem Relation Name Age of Onset    Diabetes Mother      Hypertension Mother      Skin cancer Mother      Other (HEPATIC CIRRHOSIS [Other]) Father      No Known Problems Sister      Other (AMD) Brother      Retinal detachment Daughter      Retinal detachment Son      Cervical cancer Mother's Sister      Cervical cancer Maternal Grandmother       Allergies   Allergen Reactions    Naltrexone Unknown    Penicillin Unknown     Current Outpatient Medications   Medication Instructions    aspirin 81 mg, oral, Every 12 hours    baclofen (Lioresal) 10 mg tablet Take  0.5 tablets (5 mg) by mouth once daily at bedtime.    buPROPion XL (WELLBUTRIN XL) 300 mg, oral, Daily RT    cholecalciferol (VITAMIN D-3) 2,000 Units, oral, Daily RT    cyanocobalamin (VITAMIN B-12) 100 mcg, oral, Daily    dapagliflozin propanediol (FARXIGA) 10 mg, oral, Daily    diazePAM (VALIUM) 10 mg, oral, Daily PRN    DULoxetine (CYMBALTA) 60 mg, oral, Daily RT    estradiol (Estrace) 0.01 % (0.1 mg/gram) vaginal cream Place a dime sized amount vaginally  3 times a week    fluticasone (Flonase) 50 mcg/actuation nasal spray 2 sprays, Each Nostril, Daily RT    hydroxychloroquine (PLAQUENIL) 400 mg, oral, Daily RT    hyoscyamine ER (Levbid) 0.375 mg 12 hr tablet 1 tablet, oral, Daily    levothyroxine (SYNTHROID, LEVOXYL) 50 mcg, oral, Daily, as directed    losartan (COZAAR) 100 mg, oral, Daily RT    metoprolol succinate XL (TOPROL-XL) 200 mg, oral, Daily    modafinil (PROVIGIL) 200 mg, oral, 2 times daily    multivit-min-iron-FA-vit K-lut (Centrum Silver Women) 8 mg iron-400 mcg-50 mcg tablet 1 tablet, oral, Daily    omeprazole (PRILOSEC) 40 mg, oral, Daily before breakfast    potassium chloride (Klor-Con) 20 mEq packet 20 mEq, oral    pregabalin (LYRICA) 200 mg, oral, Daily    spironolactone (ALDACTONE) 25 mg, oral, Daily    tiZANidine (Zanaflex) 2 mg tablet TAKE 1 TABLET BY MOUTH EVERY 8 HOURS AS NEEDED (caution for sedation)    torsemide (DEMADEX) 20 mg, oral, Daily    vibegron 75 mg, oral, Daily    Vumerity 231 mg, oral, 2 times daily, Take 2 tabs BID       Objective       Physical Exam  Vital Signs: as recorded above  General: Well groomed, well nourished   Orientation:  Alert , oriented to time, place , and person   Mood and Affect:  Cooperative , no apparent distress normal affect  Skin: Good color, good turgor  Eyes: Extra ocular muscle movements intact, anicteric sclerae  Neck: Supple, full range of movement  Chest: Normal breath sounds, normal chest wall exam, symmetric, good air entry, clear to  auscultation  Heart: Regular rate and rhythm, without murmur, gallop, or rubs  Abdomen soft nontender no masses felt no hepatosplenomegaly, no rebound or guarding  BACK:  no CTLS spine tenderness, no flank tenderness  Extremities: full range of movement  bilateral UE and bilateral LE,  no lower extremity edema  Neurological: Alert, oriented, cranial nerves II-XII intact except for visual acuity  Sensation:  Intact   Gait: normal steady      Assessment/Plan   Jes Roper is a 69 y.o. female who presents for the concerns below:    Problem List Items Addressed This Visit    None  HYPERCHOLESTEROLEMIA PLAN: will check lipid start  medications  Follow low cholesterol diet, encouraged high omega 3 fatty acid intake in diet, exercise as tolerated , weight control. .recheck in a month on low dose crestor    Multiple sclerosis stable on medication ,seeing neurology   HF seeing Dr Mead she is on farxiga breathing better on it   CHRONIC PAIN PLAN:   Patient has had no change in intake pattern, , with fair-good relief of pain and no adverse effects. Patient aware of [facility] [place] [facility] regarding treatment of pain with controlled analgesics.    Reiterated no alcohol or other medications or supplements that may increase adverse effects of this medication.  Follow-up in one month with changes in dosage or in three months with stable use.  OARRS checked with no discrepancies.  UDS up to date and signed agreement on file    Annual Wellness Visit  the risks and benefits of influenza vaccination were discussed with the patient, influenza vaccine is up to date this year  the risks and benefits of Prevnar 20  vaccination were discussed with the patient, Prevnar 20 vaccine is    up to date  the risks and benefits of pneumonia vaccination were discussed with the patient, pneumonia vaccine is     up to date   the risks and benefits of  Shingrix  vaccination were discussed with the patient, Shingrix  vaccine is   not     up to date  there is a shortage of the vaccine  the risks and benefits of tetanus vaccination were discussed with the patient, tetanus vaccine is      up to date   Cardiovascular screening and counseling the risk and benefits of screening were discussed counseling on maintaining a healthy diet and due for lipid panel  Breast cancer screening and counseling , the risks and benefits of screening were discussed with the patient, and screening is current     order is in  Cervical cancer screening and counseling , the risks and benefits of screening were discussed with the patient, and screening is current     order is in  Osteoporosis screening and counseling was given on obtaining adequate amounts of calcium and vitamin D on a daily basis and weight bearing exercise such as walking  , the risks and benefits of screening were discussed with the patient, and screening is current     order is in  Colorectal cancer screening and counseling; the risks and benefits of screening were discussed with the patient, colon cancer screening is     up to date , order is in   Abdominal aortic aneurysm screening and counseling,  the risks and benefits of screening were discussed with the patient, screening is not indicated   Visual acuity / Glaucoma screening and counseling , the risks and benefits of vision screening were discussed with the patient, screening is current   HIV screening and Counseling, the risks and benefits of screening were discussed with the patient, screening is not indicated   Advance directive planning : needs to be updated information forms given to patient .  complete and up  to date   Other Preventive Counseling Provided :  fall risk reduction  seat belt use, sunscreen use , and increasing physical activity .  Patient Discussion:  plan discussed with the patient and follow-up visit needed            Discussed with:   Return in :    Portions of this note were generated using digital voice recognition software, and may  contain grammatical errors       Ambrosio Gallegos MD  02/29/24  1:38 PM

## 2024-02-29 NOTE — PATIENT INSTRUCTIONS
We will check your cholesterol and liver test today , since we should consider low dose rosuvastatin (Crestor)  5 mg , and then we will recheck liver and lipid panel again in a month    COLONOSCOPY SCHEDULING     [place],                [phone] Dr Ramon Persaud       Mammogram in August     Make an appointment with gynecology DR Pollock [phone]    Dr Guillen   [phone]

## 2024-03-05 DIAGNOSIS — Z86.010 PERSONAL HISTORY OF COLONIC POLYPS: ICD-10-CM

## 2024-03-05 RX ORDER — POLYETHYLENE GLYCOL 3350, SODIUM SULFATE ANHYDROUS, SODIUM BICARBONATE, SODIUM CHLORIDE, POTASSIUM CHLORIDE 236; 22.74; 6.74; 5.86; 2.97 G/4L; G/4L; G/4L; G/4L; G/4L
4000 POWDER, FOR SOLUTION ORAL ONCE
Qty: 4000 ML | Refills: 0 | Status: SHIPPED | OUTPATIENT
Start: 2024-03-05 | End: 2024-03-05

## 2024-03-07 NOTE — PROGRESS NOTES
"Subjective   Patient ID: Jes Roper is a 69 y.o. female who presents for pessary fitting.  HPI  68-year-old with a history of a #4 incontinence ring pessary with history of genuine stress urinary incontinence having undergone 10/19/2020 excision of urethral polyp and cystoscopy for history of urinary urgency, stress incontinence, vaginal atrophy with urethral polyp, multiple sclerosis, pelvic floor weakness, and constipation with urinary urgency and frequency.    The patient has been noting worsening urinary urgency and frequency. Gemtesa continues to be cost prohibitive and not covered by her insurance. Third line therapy options were discussed at length.     She successfully underwent the MRI for which her pessary was removed, she presents for pessary refitting. Ho    She denies any vaginal complaints, no abnormal bleeding or discharge.     She denies any bowel related complaints, no fecal or flatal incontinence.    She has no other complaints.    From Previous note  This visit was performed through telemedicine  68-year-old with a history of a #4 incontinence ring pessary with history of genuine stress urinary incontinence having undergone 10/19/2020 excision of urethral polyp and cystoscopy for history of urinary urgency, stress incontinence, vaginal atrophy with urethral polyp, multiple sclerosis, pelvic floor weakness, and constipation with urinary urgency and frequency with a  #3 incontinence dish fitted 1/8/2024.     The patient had a sensation of pressure and \"like  was trying to push something out\" after she left the office after her  #3 incontinence dish was fitted 1/8/2024. She took tylenol and the discomfort resolved.  She is overall satisfied with her pessary at this time.    She has been utilizing Gemtesa with significant benefits, she denies any urgency complaints. The medication however is $95 for a 30-day supply.     She denies any vaginal complaints, no abnormal bleeding or discharge. "     She denies any bowel related complaints, no fecal or flatal incontinence.    She has no other complaints.    From Previous note  68-year-old with #4 incontinence ring pessary with history of genuine stress urinary incontinence having undergone 10/19/2020 excision of urethral polyp and cystoscopy for history of urinary urgency, stress incontinence, vaginal atrophy with urethral polyp, multiple sclerosis, pelvic floor weakness, and constipation with urinary urgency and frequency.     The patient did not note any changes in her LUTS with the pessary out. She notes worsening urinary urgency and frequency when she takes her diuretics. She notes daytime frequency associated with her diuretic use. She notes 1-2 episodes of nocturia. Gemtesa samples were provided to her. She denies any UTI like symptoms.     She denies any vaginal complaints, no abnormal bleeding or discharge.     She denies any bowel related complaints, no fecal or flatal incontinence.    She has no other complaints.      From Previous note  68-year-old with #4 incontinence ring pessary with history of genuine stress urinary incontinence having undergone 10/19/2020 excision of urethral polyp and cystoscopy for history of urinary urgency, stress incontinence, vaginal atrophy with urethral polyp, multiple sclerosis, pelvic floor weakness, and constipation with urinary urgency and frequency.     The patient required to have an MRI and was unable to proceed due to the pessary. She presents to get the pessary removed to be able to have the MRI.     She has a fall 10/17 and is noting worsening MS symptoms since then.     She notes worsening urinary urgency and frequency when she takes her diuretics. She notes daytime frequency associated with her diuretic use. She notes 1-2 episodes of nocturia.    She denies any vaginal complaints, no abnormal bleeding or discharge.     She denies any bowel related complaints, no fecal or flatal incontinence.    She has no  "other complaints.    From Previous note  68-year-old with #4 incontinence ring pessary with history of genuine stress urinary incontinence having undergone 10/19/2020 excision of urethral polyp and cystoscopy for history of urinary urgency, stress incontinence, vaginal atrophy with urethral polyp, multiple sclerosis, pelvic floor weakness, and constipation with urinary urgency and frequency.     She is overall very satisfied with her pessary. She denies any abnormal vaginal bleeding or discharge. She notes improvement in her stress urinary incontinence complaints.     She is no longer taking Myrbetriq. \"I have a full plate\". She is presently managing her mother who is 98 and on hospice. She also has had recent increase in her diuretic requirements. She notes daytime frequency associated with her diuretic use. She notes 1-2 episodes of nocturia.     She has no other complaints.     From previous note     67-year-old with #4 incontinence ring pessary with history of genuine stress urinary incontinence having undergone 10/19/2020 excision of urethral polyp and cystoscopy for history of urinary urgency, stress incontinence, vaginal atrophy with urethral polyp, multiple sclerosis, pelvic floor weakness, and constipation with urinary urgency and frequency.     The patient was last seen in December 2022. The patient has not started taking the Myrbetriq as it was cost prohibitive for her. She is switching insurance and will contact again after she is established. She continues to note urinary urgency and incontinence complaints. She continues to note rare stress urinary incontinence and states that she \" some days when its under control and some days she cannot move without leaking\". She denies any UTI like symptoms. She is satisfied from the pessary standpoint.      She denies any bowel related complaints, no fecal or flatal incontinence.     She denies any vaginal complaints, no abnormal vaginal bleeding or discharge. She " "is utilizing the vaginal estrogen cream.      She has no other complaints.   From previous note  67-year-old with #4 incontinence ring pessary with history of genuine stress urinary incontinence having undergone 10/19/2020 excision of urethral polyp and cystoscopy for history of urinary urgency, stress incontinence, vaginal atrophy with urethral polyp, multiple sclerosis, pelvic floor weakness, and constipation with urinary urgency and frequency presenting today for pessary maintenance.     The patient state she continues to note rare stress urinary incontinence. She is overall satisfied with her incontinence ring. She denies any vaginal complaints. She denies any UTI-like symptoms. He does however note episodic urinary urgency, frequency, and incontinence. \"It comes in waves\". She wishes to proceed with Myrbetriq in 2023.     She denies any bowel related complaints, no fecal or flatal incontinence.     She denies any vaginal complaints, no abnormal vaginal bleeding or discharge.     She has no other complaints.      From previous note   67-year-old with history of MS with mixed urinary incontinence and #4 incontinence ring pessary having undergone 10/19/2020 excision of urethral polyp and cystoscopy for history of urethral polyp presenting for pessary follow-up.     She is overall very satisfied with her pessary ring. She denies any UTI-like symptoms. She feels that she is emptying her bladder appropriately. However she has noted an episode of vaginal spotting over the last week. She otherwise denies any abnormal vaginal discharge.     At her last appointment, we discussed her concerns for several episodes of urinary incontinence. However it was unclear whether this was urine or vaginal discharge. She was instructed to proceed with a phenazopyridine challenge. She has been unable to perform this. She does not have any particular lower urinary tract complaints at this time. .      She has a \"very full plate\" as her " "97-year-old mother recently moved in. She has not had the opportunity to \"really think about\" her lower urinary tract symptoms.     She has no other complaints.     From previous note  66-year-old presenting for incontinence ring pessary follow-up after 10/19/2020 excision of urethral polyp and cystoscopy for history of urethral polyp, urinary urgency, female stress incontinence, and vaginal atrophy with history of MS.     The patient was refitted with a #4 incontinence ring at her last appointment. She is overall very satisfied with this. She feels that this has significantly improved her urinary leakage as well as her urgency complaints. She wishes to continue this therapy moving forward.     She denies any UTI-like symptoms.     She has picked up her vaginal estrogen therapy but has not begun this.     She has no other complaints.     From previous note  65-year-old with urethral polyp, urinary urgency, and vaginal atrophy.     The patient continues to utilize her vaginal estrogen therapy. She has not noted any improvement in her urethral polyp complaints. She denies any gross hematuria. She denies any significant changes in her urinary urgency.     She wishes to proceed with definitive surgical management.     She has no other complaints.     From previous note  65-year-old presenting as a referral from Dr. Carvalho with complaints of a urethral mass, gross hematuria, and urinary urgency.     The patient was originally evaluated in March with complaints of vaginal bleeding. She was noted to have urethral prolapse and vaginal atrophy and started on vaginal estrogen cream. Upon reevaluation her prolapse had improved but upon evaluation by Dr. Carvalho was found to have a urethral polyp. Cystoscopy at that time was normal and upper tract imaging demonstrated nonobstructive bilateral nephrolithiasis.     The patient continues to note episodic spotting when wiping. She does note urinary urgency and frequency but denies " any urge related incontinence. She does note rare stress urinary incontinence. She notes nocturia up to 3 times. She denies enuresis. She denies a history of chronic urinary tract infections.     She is not sexually active. She does suffer from MS. She denies any vaginal bulge complaints. She denies any abnormal vaginal discharge.     She has episodic diarrhea and constipation. She denies any fecal or flatal incontinence.     She has no other complaints.   Review of Systems  Constitutional: No fever, No chills and No fatigue.   Eyes: No vision problems and No dryness of the eyes.   ENT: No dry mouth, No hearing loss and No nosebleeds.   Cardiovascular: No chest pain, No palpitations and No orthopnea.   Respiratory: No shortness of breath, No cough and No wheezing.   Gastrointestinal: No abdominal pain, No constipation, No nausea, No diarrhea, No vomiting and No melena.   Genitourinary: As noted in HPI.   Musculoskeletal: No back pain, No myalgias, No muscle weakness, No joint swelling and No leg edema.   Integumentary: No rashes, No skin lesion and No itching.   Neurological: No headache, No numbness and No dizziness.   Psychiatric: No sleep disturbances, No anxiety and No depression.   Endocrine: No hot flashes, No loss of hair and No hirsutism.   Hematologic/Lymphatic: No swollen glands, No tendency for easy bleeding and No tendency for easy bruising.   All other systems have been reviewed and are negative for complaint.        Objective   Physical Exam    PHYSICAL EXAMINATION:  No LMP recorded.  There is no height or weight on file to calculate BMI.  There were no vitals taken for this visit.  General Appearance: well appearing  Neuro: Alert and oriented   HEENT: mucous membranes moist, neck supple  Resp: No respiratory distress, normal work of breathing  MSK: normal range of motion, gait appropriate    The patient was fitted with a #3 incontinence ring      Assessment/Plan   68-year-old with a history of a #4  incontinence ring pessary refitted with a number for incontinence ring today 3/8/2024 with history of genuine stress urinary incontinence having undergone 10/19/2020 excision of urethral polyp and cystoscopy for history of urinary urgency, stress incontinence, vaginal atrophy with urethral polyp, multiple sclerosis, pelvic floor weakness, and constipation with urinary urgency and frequency.     The patient's incontinence ring was replaced today with a #3 incontinence ring 3/8/2024.  We discussed the importance of continuing vaginal estrogen therapy. We have previously discussed the patient's urodynamics. We specifically discussed therapy for her stress urinary incontinence. We have previously discussed pelvic floor physical therapy, pessary management, and definitive management with a mid urethral sling or Bulkamid therapy. She is overall very satisfied with her incontinence ring.we again discussed urodynamics findings noting terminal contraction noted at 300 cc. We again discussed the importance of timed voiding and fluid management strategies. She had no benefits with oxybutynin therapy in the past.  Her daughter is a pharmacist and had asked about Vesicare and we discussed that this was a medication similar to the oxybutynin she had previously used with minimal benefits.  She had excellent results with her Gemtesa but this does appear to be cost prohibitive at $95 a month.  Unfortunately this is cost prohibitive.  We did again discuss at length today PTNS, Botox, and InterStim including the various risks of these procedures.       2. We again discussed the patient's constipation. We discussed titrating daily MiraLAX therapy. We discussed the importance of daily fiber therapy. She will continue this moving forward.     #3 she will continue her vaginal estrogen therapy 3 times weekly.    4.  The patient will contact the clinic should she wish to proceed with more advanced therapies for her lower urinary tract  complaints.  Otherwise she will follow-up in July/August 2024 for pessary maintenance.     ANTWON Pope MD      Scribe Attestation    By signing my name below, I, Shae Bojorquez attest that this documentation has been prepared under the direction and in the presence of Ajay Pope MD. All medical record entries made by the Scribe were at my direction or personally dictated by me. I have reviewed the chart and agree that the record accurately reflects my personal performance of the history, physical exam, discussion and plan.

## 2024-03-08 ENCOUNTER — OFFICE VISIT (OUTPATIENT)
Dept: UROLOGY | Facility: CLINIC | Age: 69
End: 2024-03-08
Payer: COMMERCIAL

## 2024-03-08 DIAGNOSIS — N39.41 URGE URINARY INCONTINENCE: ICD-10-CM

## 2024-03-08 DIAGNOSIS — Z96.0 PRESENCE OF PESSARY: ICD-10-CM

## 2024-03-08 DIAGNOSIS — R31.21 ASYMPTOMATIC MICROSCOPIC HEMATURIA: ICD-10-CM

## 2024-03-08 DIAGNOSIS — G35 MULTIPLE SCLEROSIS (MULTI): ICD-10-CM

## 2024-03-08 DIAGNOSIS — Z46.89 FITTING AND ADJUSTMENT OF PESSARY: ICD-10-CM

## 2024-03-08 DIAGNOSIS — N95.2 VAGINAL ATROPHY: ICD-10-CM

## 2024-03-08 DIAGNOSIS — Z46.89 PESSARY MAINTENANCE: ICD-10-CM

## 2024-03-08 DIAGNOSIS — N39.3 FEMALE STRESS INCONTINENCE: Primary | ICD-10-CM

## 2024-03-08 PROCEDURE — 1036F TOBACCO NON-USER: CPT | Performed by: OBSTETRICS & GYNECOLOGY

## 2024-03-08 PROCEDURE — 1160F RVW MEDS BY RX/DR IN RCRD: CPT | Performed by: OBSTETRICS & GYNECOLOGY

## 2024-03-08 PROCEDURE — 1126F AMNT PAIN NOTED NONE PRSNT: CPT | Performed by: OBSTETRICS & GYNECOLOGY

## 2024-03-08 PROCEDURE — 3060F POS MICROALBUMINURIA REV: CPT | Performed by: OBSTETRICS & GYNECOLOGY

## 2024-03-08 PROCEDURE — 57160 INSERT PESSARY/OTHER DEVICE: CPT | Performed by: OBSTETRICS & GYNECOLOGY

## 2024-03-08 PROCEDURE — 4010F ACE/ARB THERAPY RXD/TAKEN: CPT | Performed by: OBSTETRICS & GYNECOLOGY

## 2024-03-08 PROCEDURE — 99213 OFFICE O/P EST LOW 20 MIN: CPT | Performed by: OBSTETRICS & GYNECOLOGY

## 2024-03-08 PROCEDURE — 1123F ACP DISCUSS/DSCN MKR DOCD: CPT | Performed by: OBSTETRICS & GYNECOLOGY

## 2024-03-08 PROCEDURE — A4562 PESSARY, NON RUBBER,ANY TYPE: HCPCS | Performed by: OBSTETRICS & GYNECOLOGY

## 2024-03-08 PROCEDURE — 1159F MED LIST DOCD IN RCRD: CPT | Performed by: OBSTETRICS & GYNECOLOGY

## 2024-03-08 NOTE — PATIENT INSTRUCTIONS
Please follow-up in August 2024 for pessary maintenance.    Please contact the clinic should you wish to proceed with any further therapy for your lower urinary tract complaints.    [phone]

## 2024-03-13 ENCOUNTER — LAB (OUTPATIENT)
Dept: LAB | Facility: LAB | Age: 69
End: 2024-03-13
Payer: COMMERCIAL

## 2024-03-13 ENCOUNTER — OFFICE VISIT (OUTPATIENT)
Dept: RHEUMATOLOGY | Facility: CLINIC | Age: 69
End: 2024-03-13
Payer: COMMERCIAL

## 2024-03-13 VITALS
DIASTOLIC BLOOD PRESSURE: 54 MMHG | BODY MASS INDEX: 34.74 KG/M2 | OXYGEN SATURATION: 98 % | TEMPERATURE: 98.1 F | HEART RATE: 75 BPM | HEIGHT: 61 IN | SYSTOLIC BLOOD PRESSURE: 117 MMHG | WEIGHT: 184 LBS

## 2024-03-13 DIAGNOSIS — M17.0 PRIMARY OSTEOARTHRITIS OF BOTH KNEES: ICD-10-CM

## 2024-03-13 DIAGNOSIS — E78.00 HYPERCHOLESTEROLEMIA: ICD-10-CM

## 2024-03-13 DIAGNOSIS — Z79.899 ENCOUNTER FOR LONG-TERM (CURRENT) USE OF MEDICATIONS: ICD-10-CM

## 2024-03-13 DIAGNOSIS — M79.7 FIBROMYALGIA: ICD-10-CM

## 2024-03-13 DIAGNOSIS — M15.9 GENERALIZED OSTEOARTHRITIS OF MULTIPLE SITES: Primary | ICD-10-CM

## 2024-03-13 LAB
ALBUMIN SERPL BCP-MCNC: 4.1 G/DL (ref 3.4–5)
ALP SERPL-CCNC: 126 U/L (ref 33–136)
ALT SERPL W P-5'-P-CCNC: 25 U/L (ref 7–45)
ANION GAP SERPL CALC-SCNC: 11 MMOL/L (ref 10–20)
AST SERPL W P-5'-P-CCNC: 19 U/L (ref 9–39)
BILIRUB DIRECT SERPL-MCNC: 0.1 MG/DL (ref 0–0.3)
BILIRUB SERPL-MCNC: 0.4 MG/DL (ref 0–1.2)
BUN SERPL-MCNC: 17 MG/DL (ref 6–23)
CALCIUM SERPL-MCNC: 9.2 MG/DL (ref 8.6–10.3)
CHLORIDE SERPL-SCNC: 108 MMOL/L (ref 98–107)
CHOLEST SERPL-MCNC: 194 MG/DL (ref 0–199)
CHOLESTEROL/HDL RATIO: 4.2
CO2 SERPL-SCNC: 29 MMOL/L (ref 21–32)
CREAT SERPL-MCNC: 0.98 MG/DL (ref 0.5–1.05)
EGFRCR SERPLBLD CKD-EPI 2021: 63 ML/MIN/1.73M*2
GLUCOSE SERPL-MCNC: 88 MG/DL (ref 74–99)
HDLC SERPL-MCNC: 46.1 MG/DL
LDLC SERPL CALC-MCNC: 115 MG/DL
NON HDL CHOLESTEROL: 148 MG/DL (ref 0–149)
POTASSIUM SERPL-SCNC: 4.6 MMOL/L (ref 3.5–5.3)
PROT SERPL-MCNC: 6.8 G/DL (ref 6.4–8.2)
SODIUM SERPL-SCNC: 143 MMOL/L (ref 136–145)
TRIGL SERPL-MCNC: 167 MG/DL (ref 0–149)
VLDL: 33 MG/DL (ref 0–40)

## 2024-03-13 PROCEDURE — 1160F RVW MEDS BY RX/DR IN RCRD: CPT | Performed by: INTERNAL MEDICINE

## 2024-03-13 PROCEDURE — 1159F MED LIST DOCD IN RCRD: CPT | Performed by: INTERNAL MEDICINE

## 2024-03-13 PROCEDURE — 2500000004 HC RX 250 GENERAL PHARMACY W/ HCPCS (ALT 636 FOR OP/ED): Performed by: INTERNAL MEDICINE

## 2024-03-13 PROCEDURE — 3074F SYST BP LT 130 MM HG: CPT | Performed by: INTERNAL MEDICINE

## 2024-03-13 PROCEDURE — 3060F POS MICROALBUMINURIA REV: CPT | Performed by: INTERNAL MEDICINE

## 2024-03-13 PROCEDURE — 4010F ACE/ARB THERAPY RXD/TAKEN: CPT | Performed by: INTERNAL MEDICINE

## 2024-03-13 PROCEDURE — 82248 BILIRUBIN DIRECT: CPT

## 2024-03-13 PROCEDURE — 1123F ACP DISCUSS/DSCN MKR DOCD: CPT | Performed by: INTERNAL MEDICINE

## 2024-03-13 PROCEDURE — 20610 DRAIN/INJ JOINT/BURSA W/O US: CPT | Performed by: INTERNAL MEDICINE

## 2024-03-13 PROCEDURE — 99214 OFFICE O/P EST MOD 30 MIN: CPT | Performed by: INTERNAL MEDICINE

## 2024-03-13 PROCEDURE — 80061 LIPID PANEL: CPT

## 2024-03-13 PROCEDURE — 80053 COMPREHEN METABOLIC PANEL: CPT

## 2024-03-13 PROCEDURE — 3078F DIAST BP <80 MM HG: CPT | Performed by: INTERNAL MEDICINE

## 2024-03-13 PROCEDURE — 36415 COLL VENOUS BLD VENIPUNCTURE: CPT

## 2024-03-13 PROCEDURE — 1036F TOBACCO NON-USER: CPT | Performed by: INTERNAL MEDICINE

## 2024-03-13 PROCEDURE — 1125F AMNT PAIN NOTED PAIN PRSNT: CPT | Performed by: INTERNAL MEDICINE

## 2024-03-13 RX ORDER — TRIAMCINOLONE ACETONIDE 40 MG/ML
40 INJECTION, SUSPENSION INTRA-ARTICULAR; INTRAMUSCULAR
Status: COMPLETED | OUTPATIENT
Start: 2024-03-13 | End: 2024-03-13

## 2024-03-13 RX ADMIN — TRIAMCINOLONE ACETONIDE 40 MG: 40 INJECTION, SUSPENSION INTRA-ARTICULAR; INTRAMUSCULAR at 11:44

## 2024-03-13 ASSESSMENT — ENCOUNTER SYMPTOMS
SHORTNESS OF BREATH: 0
LOSS OF SENSATION IN FEET: 0
FATIGUE: 1
ABDOMINAL PAIN: 0
DEPRESSION: 0
OCCASIONAL FEELINGS OF UNSTEADINESS: 1

## 2024-03-13 ASSESSMENT — PAIN SCALES - GENERAL: PAINLEVEL: 7

## 2024-03-13 NOTE — PROGRESS NOTES
Subjective   Patient ID: Jes Roper is a 69 y.o. female who presents for Follow-up (6 mo fuv ).  HPI  Patient with history of fibromyalgia and multiple sclerosis with features of optic neuritis.  Has been on various medications such as Cymbalta, Effexor, Lamictal, Lodine, naltrexone, gabapentin, Paxil, Pristiq, Prozac, Relafen, Savella, Skelaxin, Ultram, Wellbutrin, Zanaflex, Vioxx.    At her last visit we had her continue with pregabalin 100 mg twice daily.  Also had her continue with hydroxychloroquine.  Had a fall 10/17/2023.  They don't know if it's from her MS or from other MSK issues  Has been seen by PM&R.    Since he fall her walking hasn't been as good.  She tries to use the rollator often.  She never knows when her knees will give out.    Her MS provider referred her to neurosurgeon.  Reviewing her studies, she does have pathology but doesn't need any surgery.  Has been working with her MS provider and had switched to a pill which she doesn't feel is as effective.    She always has neck and back pain.  Her shoulders are always hurting.  She has had surgery on her right shoulder previously.  The inside of her knees really hurt.  H left one has been worse.  She had nerve blocks for the knees and initially it had helped.  She doesn't think she wants to the the ablation.  Review of Systems   Constitutional:  Positive for fatigue.   Respiratory:  Negative for shortness of breath.    Cardiovascular:  Negative for chest pain.   Gastrointestinal:  Negative for abdominal pain.   Musculoskeletal:         Per HPI   Neurological:         History of MS       Objective   Physical Exam  GEN: NAD A&O x3 appropriate affect, rollator  EYES: no conjunctival redness, eyelids normal  SKIN: No rashes seen on exposed skin  TENDER POINTS: 14/18   MSK:  tender b/l knees  Right shoulder replacement no evidence of synovitis in the joints of her hands wrist elbows  Assessment/Plan     Fibromyalgia and osteoarthritis. She has  a mild elevation of her CRP of uncertain clinical significance. Since being on the Plaquenil she has had some improvement of her symptoms.   -Will have her continue on pregabalin 200 mg daily.  She does feel like it is helpful for her symptoms.  Her daughter does all of her medications   -We will have her continue on hydroxychloroquine.    Osteoarthritis bilateral knees injected her bilateral knees with 40 mg of Kenalog each.  Patient ID: Jes Roper is a 69 y.o. female.    Large Joint Injection/Arthrocentesis: bilateral knee on 3/13/2024 11:44 AM  Indications: pain  Details: 25 G needle, medial approach  Medications (Right): 40 mg triamcinolone acetonide 40 mg/mL  Outcome: tolerated well, no immediate complications  Procedure, treatment alternatives, risks and benefits explained, specific risks discussed. Patient was prepped and draped in the usual sterile fashion.          OARRS:  No data recorded  I have personally reviewed the OARRS report for Jes Roper. I have considered the risks of abuse, dependence, addiction and diversion    Is the patient prescribed a combination of a benzodiazepine and opioid?    Last Urine Drug Screen / ordered today:   Recent Results (from the past 8760 hour(s))   Confirmation Opiate/Opioid/Benzo Prescription Compliance    Collection Time: 02/29/24  3:01 PM   Result Value Ref Range    Clonazepam <25 <25 ng/mL    7-Aminoclonazepam <25 <25 ng/mL    Alprazolam <25 <25 ng/mL    Alpha-Hydroxyalprazolam <25 <25 ng/mL    Midazolam <25 <25 ng/mL    Alpha-Hydroxymidazolam <25 <25 ng/mL    Chlordiazepoxide <25 <25 ng/mL    Diazepam <25 <25 ng/mL    Nordiazepam <25 <25 ng/mL    Temazepam <25 <25 ng/mL    Oxazepam <25 <25 ng/mL    Lorazepam <25 <25 ng/mL    Methadone <25 <25 ng/mL    EDDP <25 <25 ng/mL    6-Acetylmorphine <25 <25 ng/mL    Codeine <50 <50 ng/mL    Hydrocodone <25 <25 ng/mL    Hydromorphone <25 <25 ng/mL    Morphine  <50 <50 ng/mL    Norhydrocodone <25 <25 ng/mL     Noroxycodone <25 <25 ng/mL    Oxycodone <25 <25 ng/mL    Oxymorphone <25 <25 ng/mL    Fentanyl <2.5 <2.5 ng/mL    Norfentanyl <2.5 <2.5 ng/mL    Tramadol <50 <50 ng/mL    O-Desmethyltramadol <50 <50 ng/mL    Zolpidem <25 <25 ng/mL    Zolpidem Metabolite (ZCA) <25 <25 ng/mL   Screen Opiate/Opioid/Benzo Prescription Compliance    Collection Time: 02/29/24  3:01 PM   Result Value Ref Range    Creatinine, Urine Random 175.7 20.0 - 320.0 mg/dL    Amphetamine Screen, Urine Presumptive Negative Presumptive Negative    Barbiturate Screen, Urine Presumptive Negative Presumptive Negative    Cannabinoid Screen, Urine Presumptive Negative Presumptive Negative    Cocaine Metabolite Screen, Urine Presumptive Negative Presumptive Negative    PCP Screen, Urine Presumptive Negative Presumptive Negative   Amphetamine Confirm, Urine    Collection Time: 03/15/23 10:52 AM   Result Value Ref Range    Amphetamines,Urine <50 ng/mL    MDA, Urine <200 ng/mL    MDEA, Urine <200 ng/mL    MDMA, Urine <200 ng/mL    Methamphetamine Quant, Ur <200 ng/mL    Phentermine,Urine <200 ng/mL     Results are as expected.         Controlled Substance Agreement:  Date of the Last Agreement: 03/13/24  Reviewed Controlled Substance Agreement including but not limited to the benefits, risks, and alternatives to treatment with a Controlled Substance medication(s).    Lyrica:  What is the patient's goal of therapy?  Help with fibromyalgia pain  Is this being achieved with current treatment?  Yes    Pain Assessment:  No data recorded    Activities of Daily Living:  Is your overall impression that this patient is benefiting (symptom reduction outweighs side effects) from Lyrica therapy? Yes     1. Physical Functioning: Better  2. Family Relationship: Better  3. Social Relationship: Better  4. Mood: Better  5. Sleep Patterns: Better  6. Overall Function: Better      Marilyn Hennessy MD 03/13/24 11:28 AM

## 2024-03-18 ENCOUNTER — TELEPHONE (OUTPATIENT)
Dept: CARDIOLOGY | Facility: HOSPITAL | Age: 69
End: 2024-03-18
Payer: COMMERCIAL

## 2024-03-18 DIAGNOSIS — I50.22 CHRONIC SYSTOLIC HEART FAILURE (MULTI): Primary | ICD-10-CM

## 2024-03-19 RX ORDER — LOSARTAN POTASSIUM 100 MG/1
100 TABLET ORAL
Qty: 90 TABLET | Refills: 3 | Status: SHIPPED | OUTPATIENT
Start: 2024-03-19

## 2024-03-26 ENCOUNTER — OFFICE VISIT (OUTPATIENT)
Dept: GASTROENTEROLOGY | Facility: EXTERNAL LOCATION | Age: 69
End: 2024-03-26
Payer: COMMERCIAL

## 2024-03-26 DIAGNOSIS — Z12.11 SCREENING FOR MALIGNANT NEOPLASM OF COLON: ICD-10-CM

## 2024-03-26 DIAGNOSIS — Z86.010 PERSONAL HISTORY OF COLONIC POLYPS: ICD-10-CM

## 2024-03-26 DIAGNOSIS — K64.4 RESIDUAL HEMORRHOIDAL SKIN TAGS: ICD-10-CM

## 2024-03-26 DIAGNOSIS — D12.6 TUBULAR ADENOMA OF COLON: Primary | ICD-10-CM

## 2024-03-26 PROCEDURE — 4010F ACE/ARB THERAPY RXD/TAKEN: CPT | Performed by: STUDENT IN AN ORGANIZED HEALTH CARE EDUCATION/TRAINING PROGRAM

## 2024-03-26 PROCEDURE — 1123F ACP DISCUSS/DSCN MKR DOCD: CPT | Performed by: STUDENT IN AN ORGANIZED HEALTH CARE EDUCATION/TRAINING PROGRAM

## 2024-03-26 PROCEDURE — 3060F POS MICROALBUMINURIA REV: CPT | Performed by: STUDENT IN AN ORGANIZED HEALTH CARE EDUCATION/TRAINING PROGRAM

## 2024-03-26 PROCEDURE — 1160F RVW MEDS BY RX/DR IN RCRD: CPT | Performed by: STUDENT IN AN ORGANIZED HEALTH CARE EDUCATION/TRAINING PROGRAM

## 2024-03-26 PROCEDURE — 1159F MED LIST DOCD IN RCRD: CPT | Performed by: STUDENT IN AN ORGANIZED HEALTH CARE EDUCATION/TRAINING PROGRAM

## 2024-03-26 PROCEDURE — 45385 COLONOSCOPY W/LESION REMOVAL: CPT | Performed by: STUDENT IN AN ORGANIZED HEALTH CARE EDUCATION/TRAINING PROGRAM

## 2024-03-26 PROCEDURE — 88305 TISSUE EXAM BY PATHOLOGIST: CPT

## 2024-03-26 PROCEDURE — 3049F LDL-C 100-129 MG/DL: CPT | Performed by: STUDENT IN AN ORGANIZED HEALTH CARE EDUCATION/TRAINING PROGRAM

## 2024-03-26 PROCEDURE — 88305 TISSUE EXAM BY PATHOLOGIST: CPT | Performed by: PATHOLOGY

## 2024-03-27 ENCOUNTER — LAB REQUISITION (OUTPATIENT)
Dept: LAB | Facility: HOSPITAL | Age: 69
End: 2024-03-27
Payer: COMMERCIAL

## 2024-03-29 LAB
LABORATORY COMMENT REPORT: NORMAL
PATH REPORT.FINAL DX SPEC: NORMAL
PATH REPORT.GROSS SPEC: NORMAL
PATH REPORT.RELEVANT HX SPEC: NORMAL
PATH REPORT.TOTAL CANCER: NORMAL

## 2024-04-08 ENCOUNTER — TELEPHONE (OUTPATIENT)
Dept: CARDIOLOGY | Facility: HOSPITAL | Age: 69
End: 2024-04-08
Payer: COMMERCIAL

## 2024-04-08 DIAGNOSIS — I50.22 CHRONIC SYSTOLIC HEART FAILURE (MULTI): Primary | ICD-10-CM

## 2024-04-09 RX ORDER — TORSEMIDE 20 MG/1
20 TABLET ORAL DAILY
Qty: 90 TABLET | Refills: 3 | Status: SHIPPED | OUTPATIENT
Start: 2024-04-09

## 2024-04-11 NOTE — PROGRESS NOTES
This note serves to accompany a colonoscopy done on 3/26/2024    See procedure tab for full report

## 2024-04-16 ENCOUNTER — LAB (OUTPATIENT)
Dept: LAB | Facility: LAB | Age: 69
End: 2024-04-16
Payer: COMMERCIAL

## 2024-04-16 DIAGNOSIS — G35 MULTIPLE SCLEROSIS (MULTI): Primary | ICD-10-CM

## 2024-04-16 DIAGNOSIS — R53.83 OTHER FATIGUE: ICD-10-CM

## 2024-04-16 DIAGNOSIS — Z79.899 OTHER LONG TERM (CURRENT) DRUG THERAPY: ICD-10-CM

## 2024-04-16 LAB
ALBUMIN SERPL BCP-MCNC: 4.1 G/DL (ref 3.4–5)
ALP SERPL-CCNC: 106 U/L (ref 33–136)
ALT SERPL W P-5'-P-CCNC: 29 U/L (ref 7–45)
ANION GAP SERPL CALC-SCNC: 12 MMOL/L (ref 10–20)
AST SERPL W P-5'-P-CCNC: 18 U/L (ref 9–39)
BASOPHILS # BLD AUTO: 0.05 X10*3/UL (ref 0–0.1)
BASOPHILS NFR BLD AUTO: 0.8 %
BILIRUB DIRECT SERPL-MCNC: 0.1 MG/DL (ref 0–0.3)
BILIRUB SERPL-MCNC: 0.4 MG/DL (ref 0–1.2)
BUN SERPL-MCNC: 28 MG/DL (ref 6–23)
CALCIUM SERPL-MCNC: 8.9 MG/DL (ref 8.6–10.3)
CHLORIDE SERPL-SCNC: 105 MMOL/L (ref 98–107)
CO2 SERPL-SCNC: 28 MMOL/L (ref 21–32)
CREAT SERPL-MCNC: 1.24 MG/DL (ref 0.5–1.05)
EGFRCR SERPLBLD CKD-EPI 2021: 48 ML/MIN/1.73M*2
EOSINOPHIL # BLD AUTO: 0.12 X10*3/UL (ref 0–0.7)
EOSINOPHIL NFR BLD AUTO: 2 %
ERYTHROCYTE [DISTWIDTH] IN BLOOD BY AUTOMATED COUNT: 13.1 % (ref 11.5–14.5)
GLUCOSE SERPL-MCNC: 89 MG/DL (ref 74–99)
HCT VFR BLD AUTO: 42.3 % (ref 36–46)
HGB BLD-MCNC: 13.8 G/DL (ref 12–16)
IGA SERPL-MCNC: 224 MG/DL (ref 70–400)
IGG SERPL-MCNC: 978 MG/DL (ref 700–1600)
IGM SERPL-MCNC: 32 MG/DL (ref 40–230)
IMM GRANULOCYTES # BLD AUTO: 0.02 X10*3/UL (ref 0–0.7)
IMM GRANULOCYTES NFR BLD AUTO: 0.3 % (ref 0–0.9)
LYMPHOCYTES # BLD AUTO: 1.23 X10*3/UL (ref 1.2–4.8)
LYMPHOCYTES NFR BLD AUTO: 20.4 %
MCH RBC QN AUTO: 30.2 PG (ref 26–34)
MCHC RBC AUTO-ENTMCNC: 32.6 G/DL (ref 32–36)
MCV RBC AUTO: 93 FL (ref 80–100)
MONOCYTES # BLD AUTO: 0.5 X10*3/UL (ref 0.1–1)
MONOCYTES NFR BLD AUTO: 8.3 %
NEUTROPHILS # BLD AUTO: 4.1 X10*3/UL (ref 1.2–7.7)
NEUTROPHILS NFR BLD AUTO: 68.2 %
NRBC BLD-RTO: 0 /100 WBCS (ref 0–0)
PLATELET # BLD AUTO: 304 X10*3/UL (ref 150–450)
POTASSIUM SERPL-SCNC: 4.1 MMOL/L (ref 3.5–5.3)
PROT SERPL-MCNC: 6.5 G/DL (ref 6.4–8.2)
RBC # BLD AUTO: 4.57 X10*6/UL (ref 4–5.2)
SODIUM SERPL-SCNC: 141 MMOL/L (ref 136–145)
WBC # BLD AUTO: 6 X10*3/UL (ref 4.4–11.3)

## 2024-04-16 PROCEDURE — 85025 COMPLETE CBC W/AUTO DIFF WBC: CPT

## 2024-04-16 PROCEDURE — 82248 BILIRUBIN DIRECT: CPT

## 2024-04-16 PROCEDURE — 80053 COMPREHEN METABOLIC PANEL: CPT

## 2024-04-16 PROCEDURE — 82784 ASSAY IGA/IGD/IGG/IGM EACH: CPT

## 2024-04-16 PROCEDURE — 36415 COLL VENOUS BLD VENIPUNCTURE: CPT

## 2024-04-16 PROCEDURE — 86481 TB AG RESPONSE T-CELL SUSP: CPT

## 2024-04-19 LAB
NIL(NEG) CONTROL SPOT COUNT: NORMAL
PANEL A SPOT COUNT: 0
PANEL B SPOT COUNT: 0
POS CONTROL SPOT COUNT: NORMAL
T-SPOT. TB INTERPRETATION: NEGATIVE

## 2024-05-23 ENCOUNTER — CONSULT (OUTPATIENT)
Dept: ALLERGY | Facility: CLINIC | Age: 69
End: 2024-05-23
Payer: COMMERCIAL

## 2024-05-23 VITALS — DIASTOLIC BLOOD PRESSURE: 78 MMHG | HEART RATE: 79 BPM | SYSTOLIC BLOOD PRESSURE: 119 MMHG | RESPIRATION RATE: 16 BRPM

## 2024-05-23 DIAGNOSIS — I10 BENIGN HYPERTENSION: ICD-10-CM

## 2024-05-23 DIAGNOSIS — G47.33 OSA (OBSTRUCTIVE SLEEP APNEA): Primary | ICD-10-CM

## 2024-05-23 DIAGNOSIS — G47.19 EXCESSIVE DAYTIME SLEEPINESS: ICD-10-CM

## 2024-05-23 DIAGNOSIS — G47.30 SLEEP APNEA, UNSPECIFIED TYPE: ICD-10-CM

## 2024-05-23 PROCEDURE — 99204 OFFICE O/P NEW MOD 45 MIN: CPT | Performed by: ALLERGY & IMMUNOLOGY

## 2024-05-23 ASSESSMENT — COLUMBIA-SUICIDE SEVERITY RATING SCALE - C-SSRS: 1. IN THE PAST MONTH, HAVE YOU WISHED YOU WERE DEAD OR WISHED YOU COULD GO TO SLEEP AND NOT WAKE UP?: NO

## 2024-05-23 ASSESSMENT — PATIENT HEALTH QUESTIONNAIRE - PHQ9
1. LITTLE INTEREST OR PLEASURE IN DOING THINGS: NOT AT ALL
2. FEELING DOWN, DEPRESSED OR HOPELESS: NOT AT ALL
SUM OF ALL RESPONSES TO PHQ9 QUESTIONS 1 AND 2: 0

## 2024-05-23 NOTE — PROGRESS NOTES
"Subjective   Patient ID:   [id number]   Jes Roper is a 69 y.o. female who presents for stratch test and Follow-up.    Chief Complaint   Patient presents with    stratch test    Follow-up      Chief complaint: Obstructive Sleep Apnea     HPI  This patient's last visit was 10/25/18    This patient is here to evaluate for:  Obstructive Sleep Apnea     Has an auto-bipap  Max ipap 25,   Min epap 4  Ps 4    Here to update her bipap  Horacio - dx with liver cancer and started a oncological medicine.   So his appt was canceled.  She is requesting cpap supplies for her and her .    Not getting new supplies   Had [facility] in the past.     Reports + excessive daytime sleepiness , loud snoring without pap,     Pmhx: CHF, cardiomyopathy but she reports it is under control  Fell last Oct '23  History of multiple sclerosis, it has been worse.    previously reported:   Taking her of her 94yo mother and overwhelmed with this and with her medical conditions. She is trying to find an assisted living - she feels like she is a \"mess.\" And she is in a lot of pain. Tried a pain clinic but feels like people are telling her there's nothing to be done.      poor sleep. may only be 2-3 hrs at a time. Attributed this to working nights for 25 years.      Patient presents for follow up of sleep apnea.      sh : , Horacio, also followed by our office for GHULAM     Review of Systems   All other systems reviewed and are negative.        Objective     /78   Pulse 79   Resp 16      Physical Exam  Constitutional:       General: She is not in acute distress.     Appearance: Normal appearance. She is not ill-appearing.   HENT:      Head: Normocephalic and atraumatic.      Right Ear: Tympanic membrane, ear canal and external ear normal.      Left Ear: Tympanic membrane, ear canal and external ear normal.      Nose: Nose normal. No congestion or rhinorrhea.      Mouth/Throat:      Mouth: Mucous membranes are moist. "      Pharynx: Oropharynx is clear. No oropharyngeal exudate or posterior oropharyngeal erythema.   Eyes:      General:         Right eye: No discharge.         Left eye: No discharge.      Conjunctiva/sclera: Conjunctivae normal.   Cardiovascular:      Rate and Rhythm: Normal rate and regular rhythm.      Heart sounds: Normal heart sounds. No murmur heard.     No friction rub. No gallop.   Pulmonary:      Effort: Pulmonary effort is normal. No respiratory distress.      Breath sounds: Normal breath sounds. No stridor. No wheezing, rhonchi or rales.   Chest:      Chest wall: No tenderness.   Abdominal:      General: Abdomen is flat.      Palpations: Abdomen is soft.   Musculoskeletal:         General: Normal range of motion.      Cervical back: Normal range of motion and neck supple.   Lymphadenopathy:      Cervical: No cervical adenopathy.   Skin:     General: Skin is warm and dry.      Findings: No erythema, lesion or rash.   Neurological:      General: No focal deficit present.      Mental Status: She is alert. Mental status is at baseline.   Psychiatric:         Mood and Affect: Mood normal.         Behavior: Behavior normal.         Thought Content: Thought content normal.         Judgment: Judgment normal.          Current Outpatient Medications   Medication Sig Dispense Refill    aspirin 81 mg EC tablet Take 1 tablet (81 mg) by mouth every 12 hours.      baclofen (Lioresal) 10 mg tablet Take 0.5 tablets (5 mg) by mouth once daily at bedtime.      buPROPion XL (Wellbutrin XL) 300 mg 24 hr tablet Take 1 tablet (300 mg) by mouth once daily.      cholecalciferol (Vitamin D-3) 50 MCG (2000 UT) tablet Take 1 tablet (2,000 Units) by mouth once daily.      cyanocobalamin (Vitamin B-12) 100 mcg tablet Take 1 tablet (100 mcg) by mouth once daily.      dapagliflozin propanediol (Farxiga) 10 mg Take 1 tablet (10 mg) by mouth once daily. 90 tablet 3    diazePAM (Valium) 10 mg tablet Take 1 tablet (10 mg) by mouth once  daily as needed for muscle spasms.      diroximel fumarate (Vumerity) 231 mg capsule,delayed release(DR/EC) Take 231 mg by mouth 2 times a day. Take 2 tabs BID      DULoxetine (Cymbalta) 60 mg DR capsule Take 1 capsule (60 mg) by mouth once daily.      estradiol (Estrace) 0.01 % (0.1 mg/gram) vaginal cream Place a dime sized amount vaginally  3 times a week 42.5 g 3    fluticasone (Flonase) 50 mcg/actuation nasal spray Administer 2 sprays into each nostril once daily.      hydroxychloroquine (Plaquenil) 200 mg tablet Take 2 tablets (400 mg) by mouth once daily. 180 tablet 3    hyoscyamine ER (Levbid) 0.375 mg 12 hr tablet Take 1 tablet (0.375 mg) by mouth once daily.      levothyroxine (Synthroid, Levoxyl) 50 mcg tablet take 1 tablet by mouth daily as directed 90 tablet 1    losartan (Cozaar) 100 mg tablet Take 1 tablet (100 mg) by mouth once daily. 90 tablet 3    metoprolol succinate XL (Toprol-XL) 200 mg 24 hr tablet Take 1 tablet (200 mg) by mouth once daily.      multivit-min-iron-FA-vit K-lut (Centrum Silver Women) 8 mg iron-400 mcg-50 mcg tablet Take 1 tablet by mouth once daily.      omeprazole (PriLOSEC) 40 mg DR capsule Take 1 capsule (40 mg) by mouth once daily in the morning. Take before meals. 90 capsule 1    potassium chloride (Klor-Con) 20 mEq packet Take 20 mEq by mouth.      pregabalin (Lyrica) 100 mg capsule Take 2 capsules by mouth once daily. 60 capsule 2    spironolactone (Aldactone) 25 mg tablet Take 1 tablet (25 mg) by mouth once daily. 90 tablet 3    tiZANidine (Zanaflex) 2 mg tablet TAKE 1 TABLET BY MOUTH EVERY 8 HOURS AS NEEDED (caution for sedation) 180 tablet 1    torsemide (Demadex) 20 mg tablet Take 1 tablet (20 mg) by mouth once daily. 90 tablet 3    vibegron 75 mg tablet Take 1 tablet (75 mg) by mouth once daily. 30 tablet 11    modafinil (Provigil) 200 mg tablet Take 1 tablet (200 mg) by mouth twice a day. 60 tablet 2     No current facility-administered medications for this visit.        Summary of the labs over the past 6 months:    Lab on 04/16/2024   Component Date Value Ref Range Status    Glucose 04/16/2024 89  74 - 99 mg/dL Final    Sodium 04/16/2024 141  136 - 145 mmol/L Final    Potassium 04/16/2024 4.1  3.5 - 5.3 mmol/L Final    Chloride 04/16/2024 105  98 - 107 mmol/L Final    Bicarbonate 04/16/2024 28  21 - 32 mmol/L Final    Anion Gap 04/16/2024 12  10 - 20 mmol/L Final    Urea Nitrogen 04/16/2024 28 (H)  6 - 23 mg/dL Final    Creatinine 04/16/2024 1.24 (H)  0.50 - 1.05 mg/dL Final    eGFR 04/16/2024 48 (L)  >60 mL/min/1.73m*2 Final    Calcium 04/16/2024 8.9  8.6 - 10.3 mg/dL Final    Albumin 04/16/2024 4.1  3.4 - 5.0 g/dL Final    Alkaline Phosphatase 04/16/2024 106  33 - 136 U/L Final    Total Protein 04/16/2024 6.5  6.4 - 8.2 g/dL Final    AST 04/16/2024 18  9 - 39 U/L Final    Bilirubin, Total 04/16/2024 0.4  0.0 - 1.2 mg/dL Final    ALT 04/16/2024 29  7 - 45 U/L Final    WBC 04/16/2024 6.0  4.4 - 11.3 x10*3/uL Final    nRBC 04/16/2024 0.0  0.0 - 0.0 /100 WBCs Final    RBC 04/16/2024 4.57  4.00 - 5.20 x10*6/uL Final    Hemoglobin 04/16/2024 13.8  12.0 - 16.0 g/dL Final    Hematocrit 04/16/2024 42.3  36.0 - 46.0 % Final    MCV 04/16/2024 93  80 - 100 fL Final    MCH 04/16/2024 30.2  26.0 - 34.0 pg Final    MCHC 04/16/2024 32.6  32.0 - 36.0 g/dL Final    RDW 04/16/2024 13.1  11.5 - 14.5 % Final    Platelets 04/16/2024 304  150 - 450 x10*3/uL Final    Neutrophils % 04/16/2024 68.2  40.0 - 80.0 % Final    Immature Granulocytes %, Automated 04/16/2024 0.3  0.0 - 0.9 % Final    Lymphocytes % 04/16/2024 20.4  13.0 - 44.0 % Final    Monocytes % 04/16/2024 8.3  2.0 - 10.0 % Final    Eosinophils % 04/16/2024 2.0  0.0 - 6.0 % Final    Basophils % 04/16/2024 0.8  0.0 - 2.0 % Final    Neutrophils Absolute 04/16/2024 4.10  1.20 - 7.70 x10*3/uL Final    Immature Granulocytes Absolute, Au* 04/16/2024 0.02  0.00 - 0.70 x10*3/uL Final    Lymphocytes Absolute 04/16/2024 1.23  1.20 - 4.80 x10*3/uL  Final    Monocytes Absolute 04/16/2024 0.50  0.10 - 1.00 x10*3/uL Final    Eosinophils Absolute 04/16/2024 0.12  0.00 - 0.70 x10*3/uL Final    Basophils Absolute 04/16/2024 0.05  0.00 - 0.10 x10*3/uL Final    T-SPOT. TB Interpretation 04/16/2024 Negative  Negative Final    Panel A Spot Count 04/16/2024 0   Final    Panel B Spot Count 04/16/2024 0   Final    NIL(NEG) Control Spot Count 04/16/2024 Passed   Final    POS Control Spot Count 04/16/2024 Passed   Final    IgG 04/16/2024 978  700 - 1,600 mg/dL Final    IgA 04/16/2024 224  70 - 400 mg/dL Final    IgM 04/16/2024 32 (L)  40 - 230 mg/dL Final    Bilirubin, Direct 04/16/2024 0.1  0.0 - 0.3 mg/dL Final   Lab Requisition on 03/26/2024   Component Date Value Ref Range Status    Case Report 03/26/2024    Final                    Value:Surgical Pathology                                Case: [id number]                                  Authorizing Provider:  Cristiana Zhang MD    Collected:           03/26/2024 1227              Ordering Location:     [facility]       Received:            03/27/2024 2150                                     Center                                                                       Pathologist:           Ajay Jones MD                                                          Specimen:    ASCENDING COLON BIOPSY, COLON, ASCENDING POLYP, COLD SNARE, POLYPECTOMY                    FINAL DIAGNOSIS 03/26/2024    Final                    Value:This result contains rich text formatting which cannot be displayed here.      03/26/2024    Final                    Value:This result contains rich text formatting which cannot be displayed here.    Clinical History 03/26/2024    Final                    Value:This result contains rich text formatting which cannot be displayed here.    Gross Description 03/26/2024    Final                    Value:This result contains rich text formatting which cannot be displayed here.   Lab on  03/13/2024   Component Date Value Ref Range Status    Cholesterol 03/13/2024 194  0 - 199 mg/dL Final    HDL-Cholesterol 03/13/2024 46.1  mg/dL Final    Cholesterol/HDL Ratio 03/13/2024 4.2   Final    LDL Calculated 03/13/2024 115 (H)  <=99 mg/dL Final    VLDL 03/13/2024 33  0 - 40 mg/dL Final    Triglycerides 03/13/2024 167 (H)  0 - 149 mg/dL Final    Non HDL Cholesterol 03/13/2024 148  0 - 149 mg/dL Final    Glucose 03/13/2024 88  74 - 99 mg/dL Final    Sodium 03/13/2024 143  136 - 145 mmol/L Final    Potassium 03/13/2024 4.6  3.5 - 5.3 mmol/L Final    Chloride 03/13/2024 108 (H)  98 - 107 mmol/L Final    Bicarbonate 03/13/2024 29  21 - 32 mmol/L Final    Anion Gap 03/13/2024 11  10 - 20 mmol/L Final    Urea Nitrogen 03/13/2024 17  6 - 23 mg/dL Final    Creatinine 03/13/2024 0.98  0.50 - 1.05 mg/dL Final    eGFR 03/13/2024 63  >60 mL/min/1.73m*2 Final    Calcium 03/13/2024 9.2  8.6 - 10.3 mg/dL Final    Albumin 03/13/2024 4.1  3.4 - 5.0 g/dL Final    Alkaline Phosphatase 03/13/2024 126  33 - 136 U/L Final    Total Protein 03/13/2024 6.8  6.4 - 8.2 g/dL Final    AST 03/13/2024 19  9 - 39 U/L Final    Bilirubin, Total 03/13/2024 0.4  0.0 - 1.2 mg/dL Final    ALT 03/13/2024 25  7 - 45 U/L Final    Bilirubin, Direct 03/13/2024 0.1  0.0 - 0.3 mg/dL Final   Lab on 02/29/2024   Component Date Value Ref Range Status    Creatinine, Urine Random 02/29/2024 175.7  20.0 - 320.0 mg/dL Final    Amphetamine Screen, Urine 02/29/2024 Presumptive Negative  Presumptive Negative Final    Barbiturate Screen, Urine 02/29/2024 Presumptive Negative  Presumptive Negative Final    Cannabinoid Screen, Urine 02/29/2024 Presumptive Negative  Presumptive Negative Final    Cocaine Metabolite Screen, Urine 02/29/2024 Presumptive Negative  Presumptive Negative Final    PCP Screen, Urine 02/29/2024 Presumptive Negative  Presumptive Negative Final    Clonazepam 02/29/2024 <25  <25 ng/mL Final    7-Aminoclonazepam 02/29/2024 <25  <25 ng/mL Final     Alprazolam 02/29/2024 <25  <25 ng/mL Final    Alpha-Hydroxyalprazolam 02/29/2024 <25  <25 ng/mL Final    Midazolam 02/29/2024 <25  <25 ng/mL Final    Alpha-Hydroxymidazolam 02/29/2024 <25  <25 ng/mL Final    Chlordiazepoxide 02/29/2024 <25  <25 ng/mL Final    Diazepam 02/29/2024 <25  <25 ng/mL Final    Nordiazepam 02/29/2024 <25  <25 ng/mL Final    Temazepam 02/29/2024 <25  <25 ng/mL Final    Oxazepam 02/29/2024 <25  <25 ng/mL Final    Lorazepam 02/29/2024 <25  <25 ng/mL Final    Methadone 02/29/2024 <25  <25 ng/mL Final    EDDP 02/29/2024 <25  <25 ng/mL Final    6-Acetylmorphine 02/29/2024 <25  <25 ng/mL Final    Codeine 02/29/2024 <50  <50 ng/mL Final    Hydrocodone 02/29/2024 <25  <25 ng/mL Final    Hydromorphone 02/29/2024 <25  <25 ng/mL Final    Morphine  02/29/2024 <50  <50 ng/mL Final    Norhydrocodone 02/29/2024 <25  <25 ng/mL Final    Noroxycodone 02/29/2024 <25  <25 ng/mL Final    Oxycodone 02/29/2024 <25  <25 ng/mL Final    Oxymorphone 02/29/2024 <25  <25 ng/mL Final    Fentanyl 02/29/2024 <2.5  <2.5 ng/mL Final    Norfentanyl 02/29/2024 <2.5  <2.5 ng/mL Final    Tramadol 02/29/2024 <50  <50 ng/mL Final    O-Desmethyltramadol 02/29/2024 <50  <50 ng/mL Final    Zolpidem 02/29/2024 <25  <25 ng/mL Final    Zolpidem Metabolite (ZCA) 02/29/2024 <25  <25 ng/mL Final   Office Visit on 12/22/2023   Component Date Value Ref Range Status    POC Color, Urine 12/22/2023 Yellow  Straw, Yellow, Light-Yellow Final    POC Appearance, Urine 12/22/2023 Cloudy (A)  Clear Final    POC Glucose, Urine 12/22/2023 NEGATIVE  NEGATIVE mg/dl Final    POC Bilirubin, Urine 12/22/2023 NEGATIVE  NEGATIVE Final    POC Ketones, Urine 12/22/2023 TRACE (A)  NEGATIVE mg/dl Final    POC Specific Gravity, Urine 12/22/2023 1.025  1.005 - 1.035 Final    POC Blood, Urine 12/22/2023 TRACE-Intact (A)  NEGATIVE Final    POC PH, Urine 12/22/2023 5.5  No Reference Range Established PH Final    POC Protein, Urine 12/22/2023 100 (2+) (A)  NEGATIVE, 30  (1+) mg/dl Final    POC Urobilinogen, Urine 12/22/2023 0.2  0.2, 1.0 EU/DL Final    Poc Nitrite, Urine 12/22/2023 NEGATIVE  NEGATIVE Final    POC Leukocytes, Urine 12/22/2023 SMALL (1+) (A)  NEGATIVE Final   Office Visit on 12/21/2023   Component Date Value Ref Range Status    Ventricular Rate 12/21/2023 66  BPM Final    Atrial Rate 12/21/2023 66  BPM Final    ND Interval 12/21/2023 186  ms Final    QRS Duration 12/21/2023 86  ms Final    QT Interval 12/21/2023 382  ms Final    QTC Calculation(Bazett) 12/21/2023 400  ms Final    P Axis 12/21/2023 52  degrees Final    R Axis 12/21/2023 73  degrees Final    T Axis 12/21/2023 35  degrees Final    QRS Count 12/21/2023 11  beats Final    Q Onset 12/21/2023 220  ms Final    P Onset 12/21/2023 127  ms Final    P Offset 12/21/2023 176  ms Final    T Offset 12/21/2023 411  ms Final    QTC Fredericia 12/21/2023 394  ms Final   Lab on 11/30/2023   Component Date Value Ref Range Status    Urine Culture 11/30/2023 No significant growth   Final    Color, Urine 11/30/2023 Yellow  Straw, Yellow Final    Appearance, Urine 11/30/2023 Clear  Clear Final    Specific Gravity, Urine 11/30/2023 1.027  1.005 - 1.035 Final    pH, Urine 11/30/2023 5.0  5.0, 5.5, 6.0, 6.5, 7.0, 7.5, 8.0 Final    Protein, Urine 11/30/2023 30 (1+) (N)  NEGATIVE mg/dL Final    Glucose, Urine 11/30/2023 >=500 (3+) (A)  NEGATIVE mg/dL Final    Blood, Urine 11/30/2023 NEGATIVE  NEGATIVE Final    Ketones, Urine 11/30/2023 NEGATIVE  NEGATIVE mg/dL Final    Bilirubin, Urine 11/30/2023 NEGATIVE  NEGATIVE Final    Urobilinogen, Urine 11/30/2023 <2.0  <2.0 mg/dL Final    Nitrite, Urine 11/30/2023 NEGATIVE  NEGATIVE Final    Leukocyte Esterase, Urine 11/30/2023 NEGATIVE  NEGATIVE Final    WBC, Urine 11/30/2023 1-5  1-5, NONE /HPF Final    RBC, Urine 11/30/2023 1-2  NONE, 1-2, 3-5 /HPF Final    Squamous Epithelial Cells, Urine 11/30/2023 1-9 (SPARSE)  Reference range not established. /HPF Final    Mucus, Urine 11/30/2023  2+  Reference range not established. /LPF Final    Hyaline Casts, Urine 11/30/2023 1+ (A)  NONE /LPF Final     Hartley Sleepiness Scale (out of 24): 13    Assessment/Plan   Diagnoses and all orders for this visit:  GHULAM (obstructive sleep apnea)  -     Positive Airway Pressure (PAP) Therapy  Sleep apnea, unspecified type  -     Referral to Adult Sleep Medicine  Excessive daytime sleepiness  Benign hypertension    Orders for cpap supplies sent to [facility] [place].    The Obstructive sleep apnea is well treated with CPAP usage and the usage (compliance) of CPAP is good. This patient is benefiting from using PAP and it is medically necessary. Recommend continuing the current plan and machine settings.    Obstructive sleep apnea (GHULAM) is a risk factor for impairing one's ability to function on daily activities such as driving, focus and mental well-being.  And GHULAM is a risk factor and can lead to chronic health conditions and cardiovascular complications such as heart disease, high blood pressure, diabetes, heart attacks and stroke. CPAP has been shown to treat and prevent these conditions by treating the obstructive sleep apnea. This patient has hypertension and excessive daytime sleepiness   and we are addressing its treatment with CPAP. The GHULAM is WELL CONTROLLED and management with CPAP is reducing these risks.        Dat Guillen MD

## 2024-05-29 ENCOUNTER — TELEMEDICINE (OUTPATIENT)
Dept: PRIMARY CARE | Facility: CLINIC | Age: 69
End: 2024-05-29
Payer: COMMERCIAL

## 2024-05-29 DIAGNOSIS — E11.69 COMBINED HYPERLIPIDEMIA ASSOCIATED WITH TYPE 2 DIABETES MELLITUS (MULTI): ICD-10-CM

## 2024-05-29 DIAGNOSIS — K21.9 GASTROESOPHAGEAL REFLUX DISEASE WITHOUT ESOPHAGITIS: ICD-10-CM

## 2024-05-29 DIAGNOSIS — E03.9 HYPOTHYROIDISM, UNSPECIFIED TYPE: ICD-10-CM

## 2024-05-29 DIAGNOSIS — G47.19 EXCESSIVE DAYTIME SLEEPINESS: ICD-10-CM

## 2024-05-29 DIAGNOSIS — G35 MULTIPLE SCLEROSIS (MULTI): Primary | ICD-10-CM

## 2024-05-29 DIAGNOSIS — E78.2 COMBINED HYPERLIPIDEMIA ASSOCIATED WITH TYPE 2 DIABETES MELLITUS (MULTI): ICD-10-CM

## 2024-05-29 PROCEDURE — 1160F RVW MEDS BY RX/DR IN RCRD: CPT | Performed by: INTERNAL MEDICINE

## 2024-05-29 PROCEDURE — 99442 PR PHYS/QHP TELEPHONE EVALUATION 11-20 MIN: CPT | Performed by: INTERNAL MEDICINE

## 2024-05-29 PROCEDURE — 1123F ACP DISCUSS/DSCN MKR DOCD: CPT | Performed by: INTERNAL MEDICINE

## 2024-05-29 PROCEDURE — 1159F MED LIST DOCD IN RCRD: CPT | Performed by: INTERNAL MEDICINE

## 2024-05-29 PROCEDURE — 3049F LDL-C 100-129 MG/DL: CPT | Performed by: INTERNAL MEDICINE

## 2024-05-29 PROCEDURE — 3060F POS MICROALBUMINURIA REV: CPT | Performed by: INTERNAL MEDICINE

## 2024-05-29 PROCEDURE — 4010F ACE/ARB THERAPY RXD/TAKEN: CPT | Performed by: INTERNAL MEDICINE

## 2024-05-29 NOTE — PROGRESS NOTES
Subjective   Patient ID: Jes Roper is a 69 y.o. female who presents for No chief complaint on file..    HPI    I performed this visit using real-time telehealth tools, including an audio/video connection between Jes Roper and myself Dr Mckinney in office [facility], [place], [place]  Patient on with me on a virtual visit  This is a visit for her follow-up a lot of stress going on she is doing fairly well but her  is going through a lot  Took her second shot of     Review of Systems  General: Denies fever, chills, night sweats,  Eyes: Negative for recent visual changes  Ears, Nose, Throat :  Negative for hearing changes, sinus discomfort  Dermatologic: Negative for new skin conditions, rash  Respiratory: Negative for wheezing, shortness of breath, cough  Cardiovascular: Negative for chest pain, palpitations, or leg swelling  Gastrointestinal: Negative for nausea/vomiting, abdominal pain, changes in bowel habits  Genitourinary Negative for Urinary Incontinence  urgency , frequency, discomfort   Musculoskeletal: see hpi  Neurological: Negative for headaches, dizziness    Previous history  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    Abnormal serum cholesterol    Disorder of pigmentation, unspecified 03/22/2022    Discoloration of skin of toe    Dizziness and giddiness 07/01/2022    Dizziness    Dorsalgia, unspecified 12/30/2022    Back pain    Edema, unspecified 07/17/2019    Edema    Encounter for fitting and adjustment of other specified  devices 01/19/2021    Fitting and adjustment of pessary    Encounter for general adult medical examination without abnormal findings 03/22/2022    Encounter for Medicare annual wellness exam    Encounter for gynecological examination (general) (routine) without abnormal findings 12/05/2018    Visit for gynecologic examination    Encounter for gynecological examination (general) (routine) without abnormal findings 03/17/2016    Encounter for gynecological examination without abnormal finding    Encounter for other screening for malignant neoplasm of breast 11/01/2019    Encounter for screening for malignant neoplasm of breast    Encounter for other screening for malignant neoplasm of breast 03/22/2022    Breast screening    Fibromyalgia 09/29/2022    Fibromyalgia    Hyperlipidemia     Hypertension     Hypothyroidism, unspecified 03/22/2022    Hypothyroidism    Local infection of the skin and subcutaneous tissue, unspecified 02/22/2019    Toe infection    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)     Multiple sclerosis (Multi) 12/06/2022    Multiple sclerosis    Obesity, unspecified 11/21/2016    Mild obesity    Other age-related incipient cataract, bilateral 09/22/2022    Other age-related incipient cataract of both eyes    Other chronic pain 07/01/2022    Chronic pain    Other specified disorders of urethra 09/08/2020    Prolapse of urethra    Pain in right knee 06/22/2022    Right knee pain    Peripheral vascular disease, unspecified (CMS-HCC) 04/07/2022    Arterial insufficiency of lower extremity    Personal history of other diseases of urinary system 05/06/2020    History of prolapse of bladder    Personal history of other endocrine, nutritional and metabolic disease 06/11/2014    History of hypothyroidism    Personal history of other specified conditions 12/30/2022    History of chronic cough    Sciatica, right side 08/06/2019    Right sciatic nerve pain    Sleep apnea, unspecified 04/10/2017    Sleep apnea     Torticollis 2022    Torticollis    Tremor, unspecified 2022    Tremor of both hands    Unspecified cataract     Cataracts, both eyes    Unspecified skin changes 2022    Skin change    Unspecified symptoms and signs involving the genitourinary system 2021    Urinary symptom or sign     Past Surgical History:   Procedure Laterality Date     SECTION, CLASSIC  10/03/2018     Section    GALLBLADDER SURGERY  2020    Gallbladder Surgery    OTHER SURGICAL HISTORY  2021    Shoulder replacement    OTHER SURGICAL HISTORY  2020    Exploratory laparotomy    OTHER SURGICAL HISTORY  2020    Urethroplasty     Social History     Tobacco Use    Smoking status: Never    Smokeless tobacco: Never   Vaping Use    Vaping status: Never Used   Substance Use Topics    Alcohol use: Never    Drug use: Never     Family History   Problem Relation Name Age of Onset    Diabetes Mother      Hypertension Mother      Skin cancer Mother      Other (HEPATIC CIRRHOSIS [Other]) Father      No Known Problems Sister      Other (AMD) Brother      Retinal detachment Daughter Adilia     Retinal detachment Son Aureliano     Cervical cancer Mother's Sister      Cervical cancer Maternal Grandmother       Allergies   Allergen Reactions    Naltrexone Unknown    Penicillin Unknown     Current Outpatient Medications   Medication Instructions    aspirin 81 mg, oral, Every 12 hours    baclofen (Lioresal) 10 mg tablet Take 0.5 tablets (5 mg) by mouth once daily at bedtime.    buPROPion XL (WELLBUTRIN XL) 300 mg, oral, Daily RT    cholecalciferol (VITAMIN D-3) 2,000 Units, oral, Daily RT    cyanocobalamin (VITAMIN B-12) 100 mcg, oral, Daily    dapagliflozin propanediol (FARXIGA) 10 mg, oral, Daily    diazePAM (VALIUM) 10 mg, oral, Daily PRN    DULoxetine (CYMBALTA) 60 mg, oral, Daily RT    estradiol (Estrace) 0.01 % (0.1 mg/gram) vaginal cream Place a dime sized amount vaginally  3 times a week    fluticasone  (Flonase) 50 mcg/actuation nasal spray 2 sprays, Each Nostril, Daily RT    hydroxychloroquine (PLAQUENIL) 400 mg, oral, Daily RT    hyoscyamine ER (Levbid) 0.375 mg 12 hr tablet 1 tablet, oral, Daily    levothyroxine (SYNTHROID, LEVOXYL) 50 mcg, oral, Daily, as directed    losartan (COZAAR) 100 mg, oral, Daily RT    metoprolol succinate XL (TOPROL-XL) 200 mg, oral, Daily    modafinil (PROVIGIL) 200 mg, oral, 2 times daily    multivit-min-iron-FA-vit K-lut (Centrum Silver Women) 8 mg iron-400 mcg-50 mcg tablet 1 tablet, oral, Daily    omeprazole (PRILOSEC) 40 mg, oral, Daily before breakfast    potassium chloride (Klor-Con) 20 mEq packet 20 mEq, oral    pregabalin (LYRICA) 200 mg, oral, Daily    spironolactone (ALDACTONE) 25 mg, oral, Daily    tiZANidine (Zanaflex) 2 mg tablet TAKE 1 TABLET BY MOUTH EVERY 8 HOURS AS NEEDED (caution for sedation)    torsemide (DEMADEX) 20 mg, oral, Daily    vibegron 75 mg, oral, Daily    Vumerity 231 mg, oral, 2 times daily, Take 2 tabs BID       Objective       Physical Exam  TELEMEDICINE EXAM:  General:  alert , voice sounds clear   no cough    no hoarseness , speaks in full sentences strong clear voice  Mood level voice , Neuro oriented to time , place, and person      Assessment/Plan   Jes Roper is a 69 y.o. female who presents for the concerns below:    Problem List Items Addressed This Visit    None    Reflux Plan:  continue PPI (aware of potential long term side effects) diet modification weight control reflux precautions  , if any change in pattern will consider gastroenterology consult. Patient follow-up as discussed.     MS seeing DR Farias is on a new medication for Mscontrol had to discontinue Vumerity will be getting labwork soon     HYPOTHYROIDISM PLAN:  Patient is clinically stable does not have any changes in bowel habits, skin, blood pressure, heart rates, weight, and mood, will be due for TSH check.  Refills as needed.   Reiterated that patient takes  medication on an empty stomach.    Prn refill for muscle relaxant   Time Spent  with patient:  11  minutes of which greater than 50 percent was spent counselling and coordinating care.    Discussed with:   Return in :  3 months       Ambrosio Gallegos MD  05/29/24  1:25 PM

## 2024-06-04 ENCOUNTER — TELEPHONE (OUTPATIENT)
Dept: PRIMARY CARE | Facility: CLINIC | Age: 69
End: 2024-06-04
Payer: COMMERCIAL

## 2024-06-04 DIAGNOSIS — M79.7 FIBROMYALGIA: ICD-10-CM

## 2024-06-04 DIAGNOSIS — I42.9 CARDIOMYOPATHY, UNSPECIFIED TYPE (MULTI): Primary | ICD-10-CM

## 2024-06-04 RX ORDER — PREGABALIN 100 MG/1
200 CAPSULE ORAL DAILY
Qty: 60 CAPSULE | Refills: 2 | Status: SHIPPED | OUTPATIENT
Start: 2024-06-04

## 2024-06-04 RX ORDER — POTASSIUM CHLORIDE 1.5 G/1.58G
20 POWDER, FOR SOLUTION ORAL DAILY
Qty: 90 EACH | Refills: 1 | Status: SHIPPED | OUTPATIENT
Start: 2024-06-04

## 2024-06-04 NOTE — TELEPHONE ENCOUNTER
Patient called.  She has been sick since Friday.  Sore throat, chest congestion  Cough.   Her daughter had the same thing and was given tesselon perels and a medrol dose pack and she wondered if you could call that in for her since her  is having chemo treatments and does not want him to get sick.    Her phone number is [phone]    [facility] in [place] [phone]

## 2024-06-06 DIAGNOSIS — R05.9 COUGH, UNSPECIFIED TYPE: Primary | ICD-10-CM

## 2024-06-06 RX ORDER — DOXYCYCLINE 100 MG/1
100 CAPSULE ORAL 2 TIMES DAILY
Qty: 10 CAPSULE | Refills: 0 | Status: SHIPPED | OUTPATIENT
Start: 2024-06-06 | End: 2024-06-11

## 2024-06-06 RX ORDER — BENZONATATE 100 MG/1
100 CAPSULE ORAL 3 TIMES DAILY PRN
Qty: 30 CAPSULE | Refills: 0 | Status: SHIPPED | OUTPATIENT
Start: 2024-06-06 | End: 2024-06-16

## 2024-06-06 RX ORDER — PREDNISONE 20 MG/1
20 TABLET ORAL DAILY
Qty: 5 TABLET | Refills: 0 | Status: SHIPPED | OUTPATIENT
Start: 2024-06-06 | End: 2024-06-11

## 2024-06-14 ENCOUNTER — TELEPHONE (OUTPATIENT)
Dept: PRIMARY CARE | Facility: CLINIC | Age: 69
End: 2024-06-14
Payer: COMMERCIAL

## 2024-06-14 DIAGNOSIS — R05.9 COUGH, UNSPECIFIED TYPE: ICD-10-CM

## 2024-06-14 RX ORDER — BENZONATATE 100 MG/1
100 CAPSULE ORAL 3 TIMES DAILY PRN
Qty: 30 CAPSULE | Refills: 0 | Status: SHIPPED | OUTPATIENT
Start: 2024-06-14 | End: 2024-06-24

## 2024-06-14 NOTE — TELEPHONE ENCOUNTER
Patient called for a refill on      Benzoate 100 mg capsules #30 take one capsule 3 times a day as needed for cough  Do not crush or chew    [facility] [phone]

## 2024-06-19 DIAGNOSIS — F43.20 ADJUSTMENT DISORDER, UNSPECIFIED: ICD-10-CM

## 2024-06-20 ENCOUNTER — APPOINTMENT (OUTPATIENT)
Dept: CARDIOLOGY | Facility: CLINIC | Age: 69
End: 2024-06-20
Payer: COMMERCIAL

## 2024-06-20 VITALS
DIASTOLIC BLOOD PRESSURE: 73 MMHG | HEIGHT: 60 IN | WEIGHT: 181 LBS | HEART RATE: 81 BPM | BODY MASS INDEX: 35.53 KG/M2 | SYSTOLIC BLOOD PRESSURE: 107 MMHG | OXYGEN SATURATION: 94 %

## 2024-06-20 DIAGNOSIS — E78.5 DYSLIPIDEMIA: ICD-10-CM

## 2024-06-20 DIAGNOSIS — R06.09 DYSPNEA ON EXERTION: ICD-10-CM

## 2024-06-20 DIAGNOSIS — I10 ESSENTIAL HYPERTENSION, BENIGN: ICD-10-CM

## 2024-06-20 DIAGNOSIS — I50.22 CHRONIC SYSTOLIC HEART FAILURE (MULTI): Primary | ICD-10-CM

## 2024-06-20 PROCEDURE — 3060F POS MICROALBUMINURIA REV: CPT | Performed by: INTERNAL MEDICINE

## 2024-06-20 PROCEDURE — 1159F MED LIST DOCD IN RCRD: CPT | Performed by: INTERNAL MEDICINE

## 2024-06-20 PROCEDURE — 1126F AMNT PAIN NOTED NONE PRSNT: CPT | Performed by: INTERNAL MEDICINE

## 2024-06-20 PROCEDURE — 1123F ACP DISCUSS/DSCN MKR DOCD: CPT | Performed by: INTERNAL MEDICINE

## 2024-06-20 PROCEDURE — 3078F DIAST BP <80 MM HG: CPT | Performed by: INTERNAL MEDICINE

## 2024-06-20 PROCEDURE — 3074F SYST BP LT 130 MM HG: CPT | Performed by: INTERNAL MEDICINE

## 2024-06-20 PROCEDURE — 99214 OFFICE O/P EST MOD 30 MIN: CPT | Performed by: INTERNAL MEDICINE

## 2024-06-20 PROCEDURE — 4010F ACE/ARB THERAPY RXD/TAKEN: CPT | Performed by: INTERNAL MEDICINE

## 2024-06-20 PROCEDURE — 1160F RVW MEDS BY RX/DR IN RCRD: CPT | Performed by: INTERNAL MEDICINE

## 2024-06-20 PROCEDURE — 1036F TOBACCO NON-USER: CPT | Performed by: INTERNAL MEDICINE

## 2024-06-20 PROCEDURE — 3049F LDL-C 100-129 MG/DL: CPT | Performed by: INTERNAL MEDICINE

## 2024-06-20 RX ORDER — ROSUVASTATIN CALCIUM 5 MG/1
5 TABLET, COATED ORAL DAILY
Qty: 30 TABLET | Refills: 11 | Status: SHIPPED | OUTPATIENT
Start: 2024-06-20 | End: 2025-06-20

## 2024-06-20 RX ORDER — TIZANIDINE 2 MG/1
TABLET ORAL
Qty: 180 TABLET | Refills: 0 | Status: SHIPPED | OUTPATIENT
Start: 2024-06-20

## 2024-06-20 ASSESSMENT — LIFESTYLE VARIABLES
SKIP TO QUESTIONS 9-10: 1
HOW OFTEN DO YOU HAVE A DRINK CONTAINING ALCOHOL: NEVER
HOW OFTEN DO YOU HAVE SIX OR MORE DRINKS ON ONE OCCASION: NEVER
HOW MANY STANDARD DRINKS CONTAINING ALCOHOL DO YOU HAVE ON A TYPICAL DAY: PATIENT DOES NOT DRINK
AUDIT-C TOTAL SCORE: 0

## 2024-06-20 ASSESSMENT — ENCOUNTER SYMPTOMS
DEPRESSION: 0
LOSS OF SENSATION IN FEET: 0
OCCASIONAL FEELINGS OF UNSTEADINESS: 1

## 2024-06-20 ASSESSMENT — PAIN SCALES - GENERAL: PAINLEVEL: 0-NO PAIN

## 2024-06-20 NOTE — PROGRESS NOTES
"Subjective   Jes Roper is a 69 y.o. female who presents to the [facility]  for follow up of chronic systolic heart failure and chronic exertional dyspnea. Last seen in December 2023.     Since our last visit, Ms. Roper has stable NYHA class II symptoms with exertional dyspnea with climbing 1 flight of stairs, walking 3-5 minutes at a time (up from walking 1-2 aisle at the grocery store prior to starting Farxiga 10 mg a day in July 2020). Providing more care giving for her  as he has been diagnosed with liver cancer including pushing him in a wheelchair to medical visits.    Her leg edema is improved compared to February 2022 when we changed diuretic to torsemide 20 mg a day. Now trace KAT taking torsemide 10 mg a day.     Had mechanical fall on 10/17/2023 \"my legs gave out\" and fell on her left shoulder (no fractures). Took steroids to evaluate if MS related. Now in PT. Decreased mobility since that episode. No syncope. Has fibromyalgia and MS with chronic pains in the legs and shoulders most prominently R sciatica pain. These are variable day-to-day but on average worse than 4 months ago.     No chest pain, PND, orthopnea, KAT, palpitations, syncope, or claudication. Has episodes of fingers/toes turning blue (no formal diagnosis of Raynaud's phenomenon).      Repeat TTE in August 2019 showed stable LV EF 40%.     Dyspnea work up:  1. 9/4/2018 TTE: LV EF ~40%, no LVH (LVMI 80 gm//m2), impaired relaxation diastology (E/e' 8), normal LA size, normal RV/RA, trivial AI, trace MR, trace TR, unable to estimate RVSP.  2. 4/6/2017 PFTs: FEV1 2.11 L (93% pred); FVC 2.63 L (89% pred), FEV1/FVC ratio 80% (103% pred), DLCO 13.46 (66% pred), DLCO/VA 3.87 (85%) diffusion capacity mildly reduced; TLC 4.69 L (106% pred), RV/TLC ratio 45% (normal), RV 2.13 L (121% pred).     Past Medical History:  1. Chronic systolic heart failure: 9/2018 LV EF 45%  2. Multiple sclerosis  3. " Fibromyalgia  4. Hypertension  5. GHULAM on CPAP     Social History:  Non-smoker     Family History:  No premature CAD in 1st degree relatives ( 55 years of age for male relatives, 65 years of age for female relatives)     Review of Systems    A 14 point review of systems was asked. All questions were negative except for pertinent positives listed in the HPI.      Objective   Physical Exam  BP Readings from Last 3 Encounters:   24 107/73   24 119/78   24 117/54      Wt Readings from Last 3 Encounters:   24 82.1 kg (181 lb)   24 83.5 kg (184 lb)   24 82.6 kg (182 lb)      BMI: Estimated body mass index is 35.35 kg/m² as calculated from the following:    Height as of this encounter: 1.524 m (5').    Weight as of this encounter: 82.1 kg (181 lb).  BSA: Estimated body surface area is 1.86 meters squared as calculated from the following:    Height as of this encounter: 1.524 m (5').    Weight as of this encounter: 82.1 kg (181 lb).    General: no acute distress  HEENT: EOMI, no scleral icterus.  Lungs: Clear to auscultation bilaterally without wheezing, rales, or rhonchi.  Cardiovascular: Regular rhythm and rate. Normal S1 and S2. No murmurs, rubs, or gallops are appreciated. JVP normal.  Abdomen: Soft, nontender, nondistended. Bowel sounds present.  Extremities: Warm and well perfused with equal 2+ pulses bilaterally.  No edema present.  Neurologic: Alert and oriented x3.    I have personally reviewed the following images and laboratory findings:  Last echocardiogram:   Most recent echo, 2019: LV EF ~40%, no LVH (LVMI 70 gm/m2), impaired relaxation diastology (E/e' 11.8), normal LA size (LYNN 27 ml/m2), normal RV/RA, trivial AI, trace MR/TR, unable to estimate RVSP.     Last cath / stress test / CACS:  2019 CT chest: Mild coronary artery calcifications present    Most recent EC2023 EC2022 ECG: Sinus rhythm, 81 bpm, Normal ECG. Personally reviewed in  "office.     Lab Results   Component Value Date    CHOL 194 03/13/2024    CHOL 235 (H) 04/22/2022     Lab Results   Component Value Date    HDL 46.1 03/13/2024    HDL 53.6 04/22/2022     Lab Results   Component Value Date    LDLCALC 115 (H) 03/13/2024     Lab Results   Component Value Date    TRIG 167 (H) 03/13/2024    TRIG 143 04/22/2022     No components found for: \"CHOLHDL\"   4/22/2023      Assessment/Plan   1. Chronic dyspnea:  Multi-factorial dyspnea due to the following causes: 1) Cardiomyopathy / chronic systolic heart failure 2) hypertension 3) Fibromyalgia / MS 4) Skeletal muscle deconditioning     - repeat August 2019 echocardiogram shows stable LV EF of 40%. She has stable NYHA class II symptoms.  - continue PT program exercises. Walking for a few minutes at a time for skeletal muscle conditioning as tolerated by MS fatigue symptomatology. Can also use aquatherapy for physical conditioning program that can reduce impact on joints during exercise. LV EF is > 35% so patient does not qualify for cardiac rehab exercise program under a heart failure indication.      2. Chronic systolic heart failure:  Stable NYHA class II symptoms. LV EF 40% on 8/2019 echocardiogram She is on optimal medical therapy for neurohormonal remodeling with metoprolol  mg a day, losartan 100 mg a day, Farxiga 10 mg a day, and spironolactone 25 mg a day for neurohormonal remodeling. Continue torsemide 10-20 mg a day.     Continue Farxiga 10 mg a day. This medication has provided improvement in severe dyspnea and renal function on recent blood work shows improved estimated GFR. She meets the criteria of enrolled patients for the DAPA-HF trial of Farxiga (dapagliflozin) 10 mg a day for prevention of worsening heart failure or cardiovascular death. I renewed her paper work for the [employer] patient assistance program in late 2023 for another 12 months through end of 2024. Initial application was approved in February 2023.    "   3. Hypertension:  Blood pressure at goal on current medications.    4. Dyslipidemia:  LDL improved with lifestyle changes to 115 from 153. Mild coronary arteriosclerosis present on 12/2019 CT scan. Goal LDL < 100. Recommend low dose statin (rosuvastatin 5 mg a day).      Follow up with Dr. Mead in 6 months.            SIGNATURE: Aguila Mead MD PATIENT NAME: Jes Roper   DATE/TIME: June 20, 2024 2:13 PM MRN: [id number]

## 2024-06-20 NOTE — PATIENT INSTRUCTIONS
You were seen in the [facility] for your chronic systolic heart failure and shortness of breath (the medical term is called dyspnea).     Your shortness of breath stable compared to 6 months ago and is likely due to a combination of your heart failure (chronic systolic heart failure), and skeletal muscle deconditioning from your MS and fibromyalgia.      Your repeat August 2019 echocardiogram showed that your heart muscle squeezing strength (LV ejection fraction) was stable at 40%.      You are on good heart failure medical therapy with metoprolol  mg, losartan 100 mg a day, Farxiga 10 mg a day, and spironolactone 25 mg a day.     Continue to take torsemide 10-20 mg a day. You can take a 1/2 tablet (10 mg) now in the summer to see if this helps your blood pressure stay in normal range. Weight yourself every day, if you gain 2 pounds overnight, take a full 20 mg tablet that morning.    I recommend we start rosuvastatin 5 mg a day to lower your cholesterol. Your LDL (bad) cholesterol was 115 better than 153 2 years ago but above long term goal < 100 to protect your heart arteries.     Follow up with Dr. Mead in 6 months.

## 2024-08-09 DIAGNOSIS — F43.20 ADJUSTMENT DISORDER, UNSPECIFIED: ICD-10-CM

## 2024-08-09 RX ORDER — TIZANIDINE 2 MG/1
TABLET ORAL
Qty: 180 TABLET | Refills: 0 | Status: SHIPPED | OUTPATIENT
Start: 2024-08-09

## 2024-08-13 DIAGNOSIS — I50.22 CHRONIC SYSTOLIC HEART FAILURE (MULTI): ICD-10-CM

## 2024-08-15 RX ORDER — DAPAGLIFLOZIN 10 MG/1
10 TABLET, FILM COATED ORAL DAILY
Qty: 90 TABLET | Refills: 3 | Status: SHIPPED | OUTPATIENT
Start: 2024-08-15 | End: 2025-08-15

## 2024-08-19 NOTE — PROGRESS NOTES
Subjective   Patient ID: Jes Roper is a 69 y.o. female who presents for pessary fitting.  HPI  68-year-old with a history of a #4 incontinence ring pessary refitted with a number for incontinence ring today 3/8/2024 with history of genuine stress urinary incontinence having undergone 10/19/2020 excision of urethral polyp and cystoscopy for history of urinary urgency, stress incontinence, vaginal atrophy with urethral polyp, multiple sclerosis, pelvic floor weakness, and constipation with urinary urgency and frequency.     The patient presents for pessary maintenance, she denies any vaginal complaints, no abnormal bleeding or discharge.     She unfortunately lost her  in April due to liver cancer.  She has been quite sad following this.  She denies any significant depression concerns and is following up with the hospice team.    She feels that her urinary symptoms are well-controlled.    She denies any bowel related complaints, no fecal or flatal incontinence.    She has no other complaints.    From Previous note  68-year-old with a history of a #4 incontinence ring pessary with history of genuine stress urinary incontinence having undergone 10/19/2020 excision of urethral polyp and cystoscopy for history of urinary urgency, stress incontinence, vaginal atrophy with urethral polyp, multiple sclerosis, pelvic floor weakness, and constipation with urinary urgency and frequency.    The patient has been noting worsening urinary urgency and frequency. Gemtesa continues to be cost prohibitive and not covered by her insurance. Third line therapy options were discussed at length.     She successfully underwent the MRI for which her pessary was removed, she presents for pessary refitting. Ho    She denies any vaginal complaints, no abnormal bleeding or discharge.     She denies any bowel related complaints, no fecal or flatal incontinence.    She has no other complaints.    From Previous note  This visit was  "performed through telemedicine  68-year-old with a history of a #4 incontinence ring pessary with history of genuine stress urinary incontinence having undergone 10/19/2020 excision of urethral polyp and cystoscopy for history of urinary urgency, stress incontinence, vaginal atrophy with urethral polyp, multiple sclerosis, pelvic floor weakness, and constipation with urinary urgency and frequency with a  #3 incontinence dish fitted 1/8/2024.     The patient had a sensation of pressure and \"like  was trying to push something out\" after she left the office after her  #3 incontinence dish was fitted 1/8/2024. She took tylenol and the discomfort resolved.  She is overall satisfied with her pessary at this time.    She has been utilizing Gemtesa with significant benefits, she denies any urgency complaints. The medication however is $95 for a 30-day supply.     She denies any vaginal complaints, no abnormal bleeding or discharge.     She denies any bowel related complaints, no fecal or flatal incontinence.    She has no other complaints.    From Previous note  68-year-old with #4 incontinence ring pessary with history of genuine stress urinary incontinence having undergone 10/19/2020 excision of urethral polyp and cystoscopy for history of urinary urgency, stress incontinence, vaginal atrophy with urethral polyp, multiple sclerosis, pelvic floor weakness, and constipation with urinary urgency and frequency.     The patient did not note any changes in her LUTS with the pessary out. She notes worsening urinary urgency and frequency when she takes her diuretics. She notes daytime frequency associated with her diuretic use. She notes 1-2 episodes of nocturia. Gemtesa samples were provided to her. She denies any UTI like symptoms.     She denies any vaginal complaints, no abnormal bleeding or discharge.     She denies any bowel related complaints, no fecal or flatal incontinence.    She has no other complaints.      From " "Previous note  68-year-old with #4 incontinence ring pessary with history of genuine stress urinary incontinence having undergone 10/19/2020 excision of urethral polyp and cystoscopy for history of urinary urgency, stress incontinence, vaginal atrophy with urethral polyp, multiple sclerosis, pelvic floor weakness, and constipation with urinary urgency and frequency.     The patient required to have an MRI and was unable to proceed due to the pessary. She presents to get the pessary removed to be able to have the MRI.     She has a fall 10/17 and is noting worsening MS symptoms since then.     She notes worsening urinary urgency and frequency when she takes her diuretics. She notes daytime frequency associated with her diuretic use. She notes 1-2 episodes of nocturia.    She denies any vaginal complaints, no abnormal bleeding or discharge.     She denies any bowel related complaints, no fecal or flatal incontinence.    She has no other complaints.    From Previous note  68-year-old with #4 incontinence ring pessary with history of genuine stress urinary incontinence having undergone 10/19/2020 excision of urethral polyp and cystoscopy for history of urinary urgency, stress incontinence, vaginal atrophy with urethral polyp, multiple sclerosis, pelvic floor weakness, and constipation with urinary urgency and frequency.     She is overall very satisfied with her pessary. She denies any abnormal vaginal bleeding or discharge. She notes improvement in her stress urinary incontinence complaints.     She is no longer taking Myrbetriq. \"I have a full plate\". She is presently managing her mother who is 98 and on hospice. She also has had recent increase in her diuretic requirements. She notes daytime frequency associated with her diuretic use. She notes 1-2 episodes of nocturia.     She has no other complaints.     From previous note     67-year-old with #4 incontinence ring pessary with history of genuine stress urinary " "incontinence having undergone 10/19/2020 excision of urethral polyp and cystoscopy for history of urinary urgency, stress incontinence, vaginal atrophy with urethral polyp, multiple sclerosis, pelvic floor weakness, and constipation with urinary urgency and frequency.     The patient was last seen in December 2022. The patient has not started taking the Myrbetriq as it was cost prohibitive for her. She is switching insurance and will contact again after she is established. She continues to note urinary urgency and incontinence complaints. She continues to note rare stress urinary incontinence and states that she \" some days when its under control and some days she cannot move without leaking\". She denies any UTI like symptoms. She is satisfied from the pessary standpoint.      She denies any bowel related complaints, no fecal or flatal incontinence.     She denies any vaginal complaints, no abnormal vaginal bleeding or discharge. She is utilizing the vaginal estrogen cream.      She has no other complaints.   From previous note  67-year-old with #4 incontinence ring pessary with history of genuine stress urinary incontinence having undergone 10/19/2020 excision of urethral polyp and cystoscopy for history of urinary urgency, stress incontinence, vaginal atrophy with urethral polyp, multiple sclerosis, pelvic floor weakness, and constipation with urinary urgency and frequency presenting today for pessary maintenance.     The patient state she continues to note rare stress urinary incontinence. She is overall satisfied with her incontinence ring. She denies any vaginal complaints. She denies any UTI-like symptoms. He does however note episodic urinary urgency, frequency, and incontinence. \"It comes in waves\". She wishes to proceed with Myrbetriq in 2023.     She denies any bowel related complaints, no fecal or flatal incontinence.     She denies any vaginal complaints, no abnormal vaginal bleeding or discharge.   " "  She has no other complaints.      From previous note   67-year-old with history of MS with mixed urinary incontinence and #4 incontinence ring pessary having undergone 10/19/2020 excision of urethral polyp and cystoscopy for history of urethral polyp presenting for pessary follow-up.     She is overall very satisfied with her pessary ring. She denies any UTI-like symptoms. She feels that she is emptying her bladder appropriately. However she has noted an episode of vaginal spotting over the last week. She otherwise denies any abnormal vaginal discharge.     At her last appointment, we discussed her concerns for several episodes of urinary incontinence. However it was unclear whether this was urine or vaginal discharge. She was instructed to proceed with a phenazopyridine challenge. She has been unable to perform this. She does not have any particular lower urinary tract complaints at this time. .      She has a \"very full plate\" as her 97-year-old mother recently moved in. She has not had the opportunity to \"really think about\" her lower urinary tract symptoms.     She has no other complaints.     From previous note  66-year-old presenting for incontinence ring pessary follow-up after 10/19/2020 excision of urethral polyp and cystoscopy for history of urethral polyp, urinary urgency, female stress incontinence, and vaginal atrophy with history of MS.     The patient was refitted with a #4 incontinence ring at her last appointment. She is overall very satisfied with this. She feels that this has significantly improved her urinary leakage as well as her urgency complaints. She wishes to continue this therapy moving forward.     She denies any UTI-like symptoms.     She has picked up her vaginal estrogen therapy but has not begun this.     She has no other complaints.     From previous note  65-year-old with urethral polyp, urinary urgency, and vaginal atrophy.     The patient continues to utilize her vaginal " estrogen therapy. She has not noted any improvement in her urethral polyp complaints. She denies any gross hematuria. She denies any significant changes in her urinary urgency.     She wishes to proceed with definitive surgical management.     She has no other complaints.     From previous note  65-year-old presenting as a referral from Dr. Carvalho with complaints of a urethral mass, gross hematuria, and urinary urgency.     The patient was originally evaluated in March with complaints of vaginal bleeding. She was noted to have urethral prolapse and vaginal atrophy and started on vaginal estrogen cream. Upon reevaluation her prolapse had improved but upon evaluation by Dr. Carvalho was found to have a urethral polyp. Cystoscopy at that time was normal and upper tract imaging demonstrated nonobstructive bilateral nephrolithiasis.     The patient continues to note episodic spotting when wiping. She does note urinary urgency and frequency but denies any urge related incontinence. She does note rare stress urinary incontinence. She notes nocturia up to 3 times. She denies enuresis. She denies a history of chronic urinary tract infections.     She is not sexually active. She does suffer from MS. She denies any vaginal bulge complaints. She denies any abnormal vaginal discharge.     She has episodic diarrhea and constipation. She denies any fecal or flatal incontinence.     She has no other complaints.   Review of Systems  Constitutional: No fever, No chills and No fatigue.   Eyes: No vision problems and No dryness of the eyes.   ENT: No dry mouth, No hearing loss and No nosebleeds.   Cardiovascular: No chest pain, No palpitations and No orthopnea.   Respiratory: No shortness of breath, No cough and No wheezing.   Gastrointestinal: No abdominal pain, No constipation, No nausea, No diarrhea, No vomiting and No melena.   Genitourinary: As noted in HPI.   Musculoskeletal: No back pain, No myalgias, No muscle weakness, No joint  swelling and No leg edema.   Integumentary: No rashes, No skin lesion and No itching.   Neurological: No headache, No numbness and No dizziness.   Psychiatric: No sleep disturbances, No anxiety and No depression.   Endocrine: No hot flashes, No loss of hair and No hirsutism.   Hematologic/Lymphatic: No swollen glands, No tendency for easy bleeding and No tendency for easy bruising.   All other systems have been reviewed and are negative for complaint.        Objective   Physical Exam    PHYSICAL EXAMINATION:  No LMP recorded.  There is no height or weight on file to calculate BMI.  There were no vitals taken for this visit.  General Appearance: well appearing  Neuro: Alert and oriented   HEENT: mucous membranes moist, neck supple  Resp: No respiratory distress, normal work of breathing  MSK: normal range of motion, gait appropriate    After the vagina was treated with lidocaine gel, the #3 incontinence ring was easily removed.  There was a small area of excoriation at the vaginal apex treated with silver nitrate.  The pessary was cleaned and replaced without difficulty.      Assessment/Plan   68-year-old with a history of a #4 incontinence ring pessary refitted with a number for incontinence ring today 3/8/2024 with history of genuine stress urinary incontinence having undergone 10/19/2020 excision of urethral polyp and cystoscopy for history of urinary urgency, stress incontinence, vaginal atrophy with urethral polyp, multiple sclerosis, pelvic floor weakness, and constipation with urinary urgency and frequency.     The patient's #3 incontinence ring was cleaned and replaced today 8/20/2024 with a small area of excoriation treated with silver nitrate.  We again discussed the importance of continuing vaginal estrogen therapy. We have previously discussed the patient's urodynamics. We specifically discussed therapy for her stress urinary incontinence. We have previously discussed pelvic floor physical therapy,  pessary management, and definitive management with a mid urethral sling or Bulkamid therapy. She is overall very satisfied with her incontinence ring.we again discussed urodynamics findings noting terminal contraction noted at 300 cc. We again discussed the importance of timed voiding and fluid management strategies. She had no benefits with oxybutynin therapy in the past.  Her daughter is a pharmacist and had asked about Vesicare and we discussed that this was a medication similar to the oxybutynin she had previously used with minimal benefits.  She had excellent results with her Gemtesa but this does appear to be cost prohibitive at $95 a month.  We again discussed PTNS, Botox, Revi, and InterStim including the various risks of these procedures.  She wishes to continue her present therapy.     2. We again discussed the patient's constipation. We discussed titrating daily MiraLAX therapy. We discussed the importance of daily fiber therapy. She will continue this moving forward.     #3 she will continue her vaginal estrogen therapy 3 times weekly.    4.  The patient will follow-up in December for pessary follow-up.  We will readdress her lower urinary tract symptoms at that time.     ANTWON Pope MD      Scribe Attestation    By signing my name below, IChristy Scribe attest that this documentation has been prepared under the direction and in the presence of Ajay Pope MD. All medical record entries made by the Scribe were at my direction or personally dictated by me. I have reviewed the chart and agree that the record accurately reflects my personal performance of the history, physical exam, discussion and plan.

## 2024-08-20 ENCOUNTER — APPOINTMENT (OUTPATIENT)
Dept: ALLERGY | Facility: CLINIC | Age: 69
End: 2024-08-20
Payer: COMMERCIAL

## 2024-08-20 ENCOUNTER — APPOINTMENT (OUTPATIENT)
Dept: UROLOGY | Facility: CLINIC | Age: 69
End: 2024-08-20
Payer: COMMERCIAL

## 2024-08-20 VITALS
WEIGHT: 187.2 LBS | DIASTOLIC BLOOD PRESSURE: 68 MMHG | BODY MASS INDEX: 36.56 KG/M2 | SYSTOLIC BLOOD PRESSURE: 136 MMHG | HEART RATE: 94 BPM

## 2024-08-20 DIAGNOSIS — G35 MULTIPLE SCLEROSIS (MULTI): ICD-10-CM

## 2024-08-20 DIAGNOSIS — N95.2 VAGINAL ATROPHY: ICD-10-CM

## 2024-08-20 DIAGNOSIS — N39.3 FEMALE STRESS INCONTINENCE: ICD-10-CM

## 2024-08-20 DIAGNOSIS — Z96.0 PRESENCE OF PESSARY: ICD-10-CM

## 2024-08-20 DIAGNOSIS — Z46.89 PESSARY MAINTENANCE: ICD-10-CM

## 2024-08-20 DIAGNOSIS — R31.21 ASYMPTOMATIC MICROSCOPIC HEMATURIA: Primary | ICD-10-CM

## 2024-08-20 DIAGNOSIS — N39.41 URGE URINARY INCONTINENCE: ICD-10-CM

## 2024-08-20 LAB
POC APPEARANCE, URINE: CLEAR
POC BILIRUBIN, URINE: NEGATIVE
POC BLOOD, URINE: NEGATIVE
POC COLOR, URINE: YELLOW
POC GLUCOSE, URINE: ABNORMAL MG/DL
POC KETONES, URINE: NEGATIVE MG/DL
POC LEUKOCYTES, URINE: NEGATIVE
POC NITRITE,URINE: NEGATIVE
POC PH, URINE: 7 PH
POC PROTEIN, URINE: NEGATIVE MG/DL
POC SPECIFIC GRAVITY, URINE: 1.02
POC UROBILINOGEN, URINE: 0.2 EU/DL

## 2024-08-20 PROCEDURE — 1159F MED LIST DOCD IN RCRD: CPT | Performed by: OBSTETRICS & GYNECOLOGY

## 2024-08-20 PROCEDURE — 4010F ACE/ARB THERAPY RXD/TAKEN: CPT | Performed by: OBSTETRICS & GYNECOLOGY

## 2024-08-20 PROCEDURE — 3049F LDL-C 100-129 MG/DL: CPT | Performed by: OBSTETRICS & GYNECOLOGY

## 2024-08-20 PROCEDURE — 3078F DIAST BP <80 MM HG: CPT | Performed by: OBSTETRICS & GYNECOLOGY

## 2024-08-20 PROCEDURE — 1160F RVW MEDS BY RX/DR IN RCRD: CPT | Performed by: OBSTETRICS & GYNECOLOGY

## 2024-08-20 PROCEDURE — 99213 OFFICE O/P EST LOW 20 MIN: CPT | Performed by: OBSTETRICS & GYNECOLOGY

## 2024-08-20 PROCEDURE — 3060F POS MICROALBUMINURIA REV: CPT | Performed by: OBSTETRICS & GYNECOLOGY

## 2024-08-20 PROCEDURE — 3075F SYST BP GE 130 - 139MM HG: CPT | Performed by: OBSTETRICS & GYNECOLOGY

## 2024-08-20 PROCEDURE — G2211 COMPLEX E/M VISIT ADD ON: HCPCS | Performed by: OBSTETRICS & GYNECOLOGY

## 2024-08-20 PROCEDURE — 81003 URINALYSIS AUTO W/O SCOPE: CPT | Performed by: OBSTETRICS & GYNECOLOGY

## 2024-08-20 PROCEDURE — 1123F ACP DISCUSS/DSCN MKR DOCD: CPT | Performed by: OBSTETRICS & GYNECOLOGY

## 2024-08-20 PROCEDURE — 1036F TOBACCO NON-USER: CPT | Performed by: OBSTETRICS & GYNECOLOGY

## 2024-08-21 ENCOUNTER — TELEPHONE (OUTPATIENT)
Dept: PRIMARY CARE | Facility: CLINIC | Age: 69
End: 2024-08-21
Payer: COMMERCIAL

## 2024-08-21 NOTE — TELEPHONE ENCOUNTER
Patient called and her  passed away on July 18th.   She is having some anxiety attacks  And trouble sleeping but she is see you next week.  Does not think she needs any medication but not sure.    [phone]

## 2024-08-23 DIAGNOSIS — I50.22 CHRONIC SYSTOLIC HEART FAILURE (MULTI): Primary | ICD-10-CM

## 2024-08-23 DIAGNOSIS — I10 ESSENTIAL HYPERTENSION, BENIGN: ICD-10-CM

## 2024-08-23 RX ORDER — METOPROLOL SUCCINATE 200 MG/1
200 TABLET, EXTENDED RELEASE ORAL DAILY
Qty: 90 TABLET | Refills: 3 | Status: SHIPPED | OUTPATIENT
Start: 2024-08-23

## 2024-08-29 ENCOUNTER — APPOINTMENT (OUTPATIENT)
Dept: PRIMARY CARE | Facility: CLINIC | Age: 69
End: 2024-08-29
Payer: COMMERCIAL

## 2024-08-30 ENCOUNTER — HOSPITAL ENCOUNTER (OUTPATIENT)
Dept: RADIOLOGY | Facility: CLINIC | Age: 69
Discharge: HOME | End: 2024-08-30
Payer: COMMERCIAL

## 2024-08-30 ENCOUNTER — LAB (OUTPATIENT)
Dept: LAB | Facility: LAB | Age: 69
End: 2024-08-30
Payer: COMMERCIAL

## 2024-08-30 ENCOUNTER — OFFICE VISIT (OUTPATIENT)
Dept: PRIMARY CARE | Facility: CLINIC | Age: 69
End: 2024-08-30
Payer: COMMERCIAL

## 2024-08-30 VITALS — BODY MASS INDEX: 36.72 KG/M2 | DIASTOLIC BLOOD PRESSURE: 85 MMHG | WEIGHT: 188 LBS | SYSTOLIC BLOOD PRESSURE: 130 MMHG

## 2024-08-30 DIAGNOSIS — R05.3 PERSISTENT COUGH: ICD-10-CM

## 2024-08-30 DIAGNOSIS — E03.9 HYPOTHYROIDISM, UNSPECIFIED: ICD-10-CM

## 2024-08-30 DIAGNOSIS — R23.0 BLUISH SKIN DISCOLORATION: ICD-10-CM

## 2024-08-30 DIAGNOSIS — I10 ESSENTIAL HYPERTENSION, BENIGN: ICD-10-CM

## 2024-08-30 DIAGNOSIS — R05.3 PERSISTENT COUGH: Primary | ICD-10-CM

## 2024-08-30 DIAGNOSIS — R20.2 PARESTHESIA OF BOTH LOWER EXTREMITIES: ICD-10-CM

## 2024-08-30 DIAGNOSIS — J42 CHRONIC BRONCHITIS, UNSPECIFIED CHRONIC BRONCHITIS TYPE (MULTI): ICD-10-CM

## 2024-08-30 DIAGNOSIS — R09.89 OTHER SPECIFIED SYMPTOMS AND SIGNS INVOLVING THE CIRCULATORY AND RESPIRATORY SYSTEMS: ICD-10-CM

## 2024-08-30 DIAGNOSIS — N18.31 STAGE 3A CHRONIC KIDNEY DISEASE (MULTI): ICD-10-CM

## 2024-08-30 DIAGNOSIS — Z12.31 SCREENING MAMMOGRAM FOR BREAST CANCER: ICD-10-CM

## 2024-08-30 DIAGNOSIS — E87.6 HYPOKALEMIA: ICD-10-CM

## 2024-08-30 DIAGNOSIS — K21.9 GASTROESOPHAGEAL REFLUX DISEASE, UNSPECIFIED WHETHER ESOPHAGITIS PRESENT: ICD-10-CM

## 2024-08-30 DIAGNOSIS — E78.00 HYPERCHOLESTEROLEMIA: ICD-10-CM

## 2024-08-30 DIAGNOSIS — G35 MULTIPLE SCLEROSIS (MULTI): ICD-10-CM

## 2024-08-30 DIAGNOSIS — I73.9 ARTERIAL INSUFFICIENCY OF LOWER EXTREMITY (CMS-HCC): ICD-10-CM

## 2024-08-30 LAB
ALBUMIN SERPL BCP-MCNC: 4 G/DL (ref 3.4–5)
ALP SERPL-CCNC: 138 U/L (ref 33–136)
ALT SERPL W P-5'-P-CCNC: 24 U/L (ref 7–45)
ANION GAP SERPL CALC-SCNC: 17 MMOL/L (ref 10–20)
AST SERPL W P-5'-P-CCNC: 16 U/L (ref 9–39)
BILIRUB SERPL-MCNC: 0.3 MG/DL (ref 0–1.2)
BUN SERPL-MCNC: 28 MG/DL (ref 6–23)
CALCIUM SERPL-MCNC: 9.1 MG/DL (ref 8.6–10.6)
CHLORIDE SERPL-SCNC: 106 MMOL/L (ref 98–107)
CO2 SERPL-SCNC: 24 MMOL/L (ref 21–32)
CREAT SERPL-MCNC: 1.33 MG/DL (ref 0.5–1.05)
EGFRCR SERPLBLD CKD-EPI 2021: 44 ML/MIN/1.73M*2
ERYTHROCYTE [DISTWIDTH] IN BLOOD BY AUTOMATED COUNT: 12.9 % (ref 11.5–14.5)
GLUCOSE SERPL-MCNC: 92 MG/DL (ref 74–99)
HCT VFR BLD AUTO: 37.8 % (ref 36–46)
HGB BLD-MCNC: 12.5 G/DL (ref 12–16)
MCH RBC QN AUTO: 30.6 PG (ref 26–34)
MCHC RBC AUTO-ENTMCNC: 33.1 G/DL (ref 32–36)
MCV RBC AUTO: 92 FL (ref 80–100)
NRBC BLD-RTO: 0 /100 WBCS (ref 0–0)
PLATELET # BLD AUTO: 337 X10*3/UL (ref 150–450)
POTASSIUM SERPL-SCNC: 4.6 MMOL/L (ref 3.5–5.3)
PROT SERPL-MCNC: 6.8 G/DL (ref 6.4–8.2)
RBC # BLD AUTO: 4.09 X10*6/UL (ref 4–5.2)
SODIUM SERPL-SCNC: 142 MMOL/L (ref 136–145)
WBC # BLD AUTO: 7.7 X10*3/UL (ref 4.4–11.3)

## 2024-08-30 PROCEDURE — 3075F SYST BP GE 130 - 139MM HG: CPT | Performed by: INTERNAL MEDICINE

## 2024-08-30 PROCEDURE — 1124F ACP DISCUSS-NO DSCNMKR DOCD: CPT | Performed by: INTERNAL MEDICINE

## 2024-08-30 PROCEDURE — 36415 COLL VENOUS BLD VENIPUNCTURE: CPT

## 2024-08-30 PROCEDURE — 3060F POS MICROALBUMINURIA REV: CPT | Performed by: INTERNAL MEDICINE

## 2024-08-30 PROCEDURE — 99214 OFFICE O/P EST MOD 30 MIN: CPT | Performed by: INTERNAL MEDICINE

## 2024-08-30 PROCEDURE — 3049F LDL-C 100-129 MG/DL: CPT | Performed by: INTERNAL MEDICINE

## 2024-08-30 PROCEDURE — 85027 COMPLETE CBC AUTOMATED: CPT

## 2024-08-30 PROCEDURE — 71046 X-RAY EXAM CHEST 2 VIEWS: CPT

## 2024-08-30 PROCEDURE — 80053 COMPREHEN METABOLIC PANEL: CPT

## 2024-08-30 PROCEDURE — 3079F DIAST BP 80-89 MM HG: CPT | Performed by: INTERNAL MEDICINE

## 2024-08-30 PROCEDURE — 4010F ACE/ARB THERAPY RXD/TAKEN: CPT | Performed by: INTERNAL MEDICINE

## 2024-08-30 PROCEDURE — 1036F TOBACCO NON-USER: CPT | Performed by: INTERNAL MEDICINE

## 2024-08-30 RX ORDER — POTASSIUM CHLORIDE 20 MEQ/1
20 TABLET, EXTENDED RELEASE ORAL DAILY
Qty: 90 TABLET | Refills: 1 | Status: SHIPPED | OUTPATIENT
Start: 2024-08-30 | End: 2025-02-26

## 2024-08-30 RX ORDER — LEVOTHYROXINE SODIUM 50 UG/1
50 TABLET ORAL DAILY
Qty: 90 TABLET | Refills: 1 | Status: SHIPPED | OUTPATIENT
Start: 2024-08-30

## 2024-08-30 RX ORDER — OMEPRAZOLE 40 MG/1
40 CAPSULE, DELAYED RELEASE ORAL
Qty: 90 CAPSULE | Refills: 1 | Status: SHIPPED | OUTPATIENT
Start: 2024-08-30

## 2024-08-30 ASSESSMENT — ENCOUNTER SYMPTOMS
LOSS OF SENSATION IN FEET: 0
DEPRESSION: 1
OCCASIONAL FEELINGS OF UNSTEADINESS: 0

## 2024-08-30 ASSESSMENT — PATIENT HEALTH QUESTIONNAIRE - PHQ9
2. FEELING DOWN, DEPRESSED OR HOPELESS: NOT AT ALL
SUM OF ALL RESPONSES TO PHQ9 QUESTIONS 1 AND 2: 2
1. LITTLE INTEREST OR PLEASURE IN DOING THINGS: MORE THAN HALF THE DAYS

## 2024-08-30 NOTE — PROGRESS NOTES
Subjective   Patient ID: Jes Roper is a 69 y.o. female who presents for FUV.    HPI  Patient in for a visit  Her   a month and a half ago,   Her mother  a year ago    Review of Systems  General: Denies fever, chills, night sweats,  Eyes: Negative for recent visual changes  Ears, Nose, Throat :  Negative for hearing changes, sinus discomfort  Dermatologic: Negative for new skin conditions, rash  Respiratory: Negative for wheezing, shortness of breath, cough  Cardiovascular: Negative for chest pain, palpitations, or leg swelling  Gastrointestinal: Negative for nausea/vomiting, abdominal pain, changes in bowel habits  Genitourinary Negative for Urinary Incontinence  urgency , frequency, discomfort   Musculoskeletal: see hpi  Neurological: Negative for headaches, dizziness    Previous history  Past Medical History:   Diagnosis Date    Abnormal findings on diagnostic imaging of other specified body structures 2022    Abnormal chest CT    Anemia, unspecified 2021    Anemia    Anxiety disorder, unspecified 2013    Anxiety    Cervicalgia 2019    Neck pain    Chronic cough 2022    Persistent cough    Disorder of kidney and ureter, unspecified 2022    Abnormal renal function    Disorder of lipoprotein metabolism, unspecified 2015    Abnormal serum cholesterol    Disorder of pigmentation, unspecified 2022    Discoloration of skin of toe    Dizziness and giddiness 2022    Dizziness    Dorsalgia, unspecified 2022    Back pain    Edema, unspecified 2019    Edema    Encounter for fitting and adjustment of other specified devices 2021    Fitting and adjustment of pessary    Encounter for general adult medical examination without abnormal findings 2022    Encounter for Medicare annual wellness exam    Encounter for gynecological examination (general) (routine) without abnormal findings 2018    Visit for gynecologic examination     Encounter for gynecological examination (general) (routine) without abnormal findings 03/17/2016    Encounter for gynecological examination without abnormal finding    Encounter for other screening for malignant neoplasm of breast 11/01/2019    Encounter for screening for malignant neoplasm of breast    Encounter for other screening for malignant neoplasm of breast 03/22/2022    Breast screening    Fibromyalgia 09/29/2022    Fibromyalgia    Hyperlipidemia     Hypertension     Hypothyroidism, unspecified 03/22/2022    Hypothyroidism    Local infection of the skin and subcutaneous tissue, unspecified 02/22/2019    Toe infection    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[facility])     Multiple sclerosis (Multi) 12/06/2022    Multiple sclerosis    Obesity, unspecified 11/21/2016    Mild obesity    Other age-related incipient cataract, bilateral 09/22/2022    Other age-related incipient cataract of both eyes    Other chronic pain 07/01/2022    Chronic pain    Other specified disorders of urethra 09/08/2020    Prolapse of urethra    Pain in right knee 06/22/2022    Right knee pain    Peripheral vascular disease, unspecified (CMS-HCC) 04/07/2022    Arterial insufficiency of lower extremity    Personal history of other diseases of urinary system 05/06/2020    History of prolapse of bladder    Personal history of other endocrine, nutritional and metabolic disease 06/11/2014    History of hypothyroidism    Personal history of other specified conditions 12/30/2022    History of chronic cough    Sciatica, right side 08/06/2019    Right sciatic nerve pain    Sleep apnea, unspecified 04/10/2017    Sleep apnea    Torticollis 03/22/2022    Torticollis    Tremor, unspecified 09/22/2022    Tremor of both hands    Unspecified cataract     Cataracts, both eyes    Unspecified skin changes 12/30/2022    Skin change    Unspecified symptoms and signs involving the genitourinary system 11/11/2021    Urinary symptom or sign     Past Surgical  History:   Procedure Laterality Date     SECTION, CLASSIC  10/03/2018     Section    GALLBLADDER SURGERY  2020    Gallbladder Surgery    OTHER SURGICAL HISTORY  2021    Shoulder replacement    OTHER SURGICAL HISTORY  2020    Exploratory laparotomy    OTHER SURGICAL HISTORY  2020    Urethroplasty     Social History     Tobacco Use    Smoking status: Never    Smokeless tobacco: Never   Vaping Use    Vaping status: Never Used   Substance Use Topics    Alcohol use: Never    Drug use: Never     Family History   Problem Relation Name Age of Onset    Diabetes Mother      Hypertension Mother      Skin cancer Mother      Other (HEPATIC CIRRHOSIS [Other]) Father      No Known Problems Sister      Other (AMD) Brother      Retinal detachment Daughter Adilia     Retinal detachment Son Aureliano     Cervical cancer Mother's Sister      Cervical cancer Maternal Grandmother       Allergies   Allergen Reactions    Naltrexone Unknown    Penicillin Unknown     Current Outpatient Medications   Medication Instructions    aspirin 81 mg, oral, Every 12 hours    baclofen (Lioresal) 10 mg tablet Take 0.5 tablets (5 mg) by mouth once daily at bedtime.    buPROPion XL (WELLBUTRIN XL) 300 mg, oral, Daily RT    cholecalciferol (VITAMIN D-3) 2,000 Units, oral, Daily RT    cyanocobalamin (VITAMIN B-12) 100 mcg, oral, Daily    dapagliflozin propanediol (FARXIGA) 10 mg, oral, Daily    diazePAM (VALIUM) 10 mg, oral, Daily PRN    DULoxetine (CYMBALTA) 60 mg, oral, Daily RT    estradiol (Estrace) 0.01 % (0.1 mg/gram) vaginal cream Place a dime sized amount vaginally  3 times a week    fluticasone (Flonase) 50 mcg/actuation nasal spray 2 sprays, Each Nostril, Daily RT    hydroxychloroquine (PLAQUENIL) 400 mg, oral, Daily RT    hyoscyamine ER (Levbid) 0.375 mg 12 hr tablet 1 tablet, oral, Daily    levothyroxine (SYNTHROID, LEVOXYL) 50 mcg, oral, Daily, as directed    losartan (COZAAR) 100 mg, oral, Daily RT     "metoprolol succinate XL (TOPROL-XL) 200 mg, oral, Daily    modafinil (PROVIGIL) 200 mg, oral, 2 times daily    multivit-min-iron-FA-vit K-lut (Centrum Silver Women) 8 mg iron-400 mcg-50 mcg tablet 1 tablet, oral, Daily    omeprazole (PRILOSEC) 40 mg, oral, Daily before breakfast    potassium chloride (Klor-Con) 20 mEq packet 20 mEq, oral, Daily    pregabalin (LYRICA) 200 mg, oral, Daily    rosuvastatin (CRESTOR) 5 mg, oral, Daily    spironolactone (ALDACTONE) 25 mg, oral, Daily    tiZANidine (Zanaflex) 2 mg tablet TAKE 1 TABLET BY MOUTH EVERY 8 HOURS AS NEEDED (caution for sedation)    torsemide (DEMADEX) 20 mg, oral, Daily    vibegron 75 mg, oral, Daily    Vumerity 231 mg, oral, 2 times daily, Take 2 tabs BID       Objective       Physical Exam  Vital Signs: as recorded above  General: Well groomed, well nourished   Orientation:  Alert , oriented to time, place , and person   Mood and Affect:  Cooperative , no apparent distress normal affect  Skin: Good color, good turgor  Eyes: Extra ocular muscle movements intact, anicteric sclerae  Neck: Supple, full range of movement  Chest: Normal breath sounds, normal chest wall exam, symmetric, good air entry, clear to auscultation  Heart: Regular rate and rhythm, without murmur, gallop, or rubs  BACK:  no CTLS spine tenderness, no flank tenderness  Extremities: full range of movement  bilateral UE and bilateral LE,  no lower extremity edema  Neurological: Alert, oriented, cranial nerves II-XII grossly intact except for visual acuity  Sensation:  Intact   Gait: normal steady      Assessment/Plan   Jes Roper is a 69 y.o. female who presents for the concerns below:    Problem List Items Addressed This Visit    None  Cough is still present  dry during the day, will do the chest xray to make sure  They have  a lot of  stuff she is a \"\"  and cannot do much at a time to clean up , consider a vacation and we may be able to see if her cough improves , consider " visiting her sister in [place] with her dtr for the thanksgiving     Renal insufficiency will recheck hydration if still high will need to do kidney imaging     Grieving ,  just , edwin bonilla will be doing     Skin redness on the left arm and went away then it recurred on the right arm will be seeing dermatology    Knee pain  seeing DR Tinoco not bad enough for knee replacement    FMS  joint pain seeing DR Hennessy , willing to do the shot for her knees so got the shots and it helped ,  she is seeing her again on     Pain on front of her legs with movement ,    Numbness bilateral feet , toes are black and blue had seen podiatry         Discussed with:   Return in :    Portions of this note were generated using digital voice recognition software, and may contain grammatical errors       Ambrosio Gallegos MD  24  2:53 PM

## 2024-09-11 ENCOUNTER — OFFICE VISIT (OUTPATIENT)
Dept: RHEUMATOLOGY | Facility: CLINIC | Age: 69
End: 2024-09-11
Payer: COMMERCIAL

## 2024-09-11 VITALS
WEIGHT: 183.4 LBS | OXYGEN SATURATION: 99 % | BODY MASS INDEX: 36.01 KG/M2 | HEART RATE: 80 BPM | SYSTOLIC BLOOD PRESSURE: 107 MMHG | DIASTOLIC BLOOD PRESSURE: 66 MMHG | HEIGHT: 60 IN

## 2024-09-11 DIAGNOSIS — M79.7 FIBROMYALGIA: Primary | ICD-10-CM

## 2024-09-11 DIAGNOSIS — Z79.899 ENCOUNTER FOR LONG-TERM (CURRENT) USE OF MEDICATIONS: ICD-10-CM

## 2024-09-11 DIAGNOSIS — M19.90 INFLAMMATORY ARTHRITIS: ICD-10-CM

## 2024-09-11 DIAGNOSIS — M15.9 GENERALIZED OSTEOARTHRITIS OF MULTIPLE SITES: ICD-10-CM

## 2024-09-11 PROCEDURE — 99214 OFFICE O/P EST MOD 30 MIN: CPT | Performed by: INTERNAL MEDICINE

## 2024-09-11 PROCEDURE — 3008F BODY MASS INDEX DOCD: CPT | Performed by: INTERNAL MEDICINE

## 2024-09-11 PROCEDURE — 3074F SYST BP LT 130 MM HG: CPT | Performed by: INTERNAL MEDICINE

## 2024-09-11 PROCEDURE — 1123F ACP DISCUSS/DSCN MKR DOCD: CPT | Performed by: INTERNAL MEDICINE

## 2024-09-11 PROCEDURE — 3060F POS MICROALBUMINURIA REV: CPT | Performed by: INTERNAL MEDICINE

## 2024-09-11 PROCEDURE — 3078F DIAST BP <80 MM HG: CPT | Performed by: INTERNAL MEDICINE

## 2024-09-11 PROCEDURE — 4010F ACE/ARB THERAPY RXD/TAKEN: CPT | Performed by: INTERNAL MEDICINE

## 2024-09-11 PROCEDURE — 3049F LDL-C 100-129 MG/DL: CPT | Performed by: INTERNAL MEDICINE

## 2024-09-11 RX ORDER — HYDROXYCHLOROQUINE SULFATE 200 MG/1
200 TABLET, FILM COATED ORAL
Qty: 90 TABLET | Refills: 3 | Status: SHIPPED | OUTPATIENT
Start: 2024-09-11 | End: 2025-09-11

## 2024-09-11 RX ORDER — PREGABALIN 150 MG/1
150 CAPSULE ORAL 2 TIMES DAILY
Qty: 60 CAPSULE | Refills: 3 | Status: SHIPPED | OUTPATIENT
Start: 2024-09-11

## 2024-09-11 ASSESSMENT — ENCOUNTER SYMPTOMS
ABDOMINAL PAIN: 0
FATIGUE: 1
SHORTNESS OF BREATH: 0

## 2024-09-11 NOTE — PROGRESS NOTES
Subjective   Patient ID: Jes Roper is a 69 y.o. female who presents for Follow-up (6 mo fuv).  HPI  Patient with history of fibromyalgia and multiple sclerosis with features of optic neuritis.  Also has a history of cardiomyopathy with chronic systolic heart failure, hypertension has been on various medications such as Cymbalta, Effexor, Lamictal, Lodine, naltrexone, gabapentin, Paxil, Pristiq, Prozac, Relafen, Savella, Skelaxin, Ultram, Wellbutrin, Zanaflex, Vioxx.    We last saw her in March and at that time we had injected her bilateral knees with Kenalog.  We had her continue with pregabalin 200 mg daily As well as hydroxychloroquine.  Unfortunately her  passed away recently in July.    Has had a chronic cough Has been seen by her PCP.    She is  sweating again.  Has a lot of stress.  She had been taking care of her mother and she passed away last year and had hoped her and her  could travel more.  He  unexpectedly but had been diagnosed in April with liver cancer.  Her walking is still unsteady and she uses a walker.  She is going to take to counselor from hospice on .    Not sleeping well, very achy  The shots in the knees she thinks that it helped off and on.  It wasn't as bad as it had been.   3months.      Is going to see vascular because she is black and blue on her legs.  Her toes still turn purple.  Pain in the right lower leg    Her hand will shake if she has intentional movement.    Her daughter usually does her pillbox.  She says she ran out of [employer] on Saturday and has been off for the last 4 days.  She always has a lot of pain but it may be a little bit worse since she has been off.    She has a lot of migratory pains but some have improved.  She is to wear a wrist brace on the right and the longer has to.  She does have right CMC pain.    Her family has been doing a lot more for her.  Her  had been doing all the cooking so she has been doing some  cooking.    Review of Systems   Constitutional:  Positive for fatigue.   Respiratory:  Negative for shortness of breath.    Cardiovascular:  Negative for chest pain.   Gastrointestinal:  Negative for abdominal pain.   Musculoskeletal:         Per HPI   Neurological:         History of MS       Objective   Physical Exam  GEN: NAD A&O x3 appropriate affect, rollator  EYES: no conjunctival redness, eyelids normal  SKIN: No rashes seen on exposed skin  TENDER POINTS: 14/18   MSK:  tender b/l knees  Right shoulder replacement no evidence of synovitis in the joints of her hands wrist elbows  Assessment/Plan     Fibromyalgia and osteoarthritis. She has a mild elevation of her CRP of uncertain clinical significance. Since being on the Plaquenil she has had some improvement of her symptoms.   -I do feel that pregabalin does help with her fibromyalgia symptoms and we will try increasing to 150 mg twice daily instead of 200 mg daily.  Can increase this further in the future.    -Uncertain of the effect of hydroxychloroquine but in the past she has felt this was helpful for symptoms and will decrease to 200 mg daily instead of 400 mg daily.  She is up-to-date with her urine drug screen and controlled substance contract.        Will have her come back in 3 months     OARRS:  No data recorded  I have personally reviewed the OARRS report for Jes Roper. I have considered the risks of abuse, dependence, addiction and diversion    Is the patient prescribed a combination of a benzodiazepine and opioid?    Last Urine Drug Screen / ordered today:   Recent Results (from the past 8760 hour(s))   Confirmation Opiate/Opioid/Benzo Prescription Compliance    Collection Time: 02/29/24  3:01 PM   Result Value Ref Range    Clonazepam <25 <25 ng/mL    7-Aminoclonazepam <25 <25 ng/mL    Alprazolam <25 <25 ng/mL    Alpha-Hydroxyalprazolam <25 <25 ng/mL    Midazolam <25 <25 ng/mL    Alpha-Hydroxymidazolam <25 <25 ng/mL    Chlordiazepoxide  <25 <25 ng/mL    Diazepam <25 <25 ng/mL    Nordiazepam <25 <25 ng/mL    Temazepam <25 <25 ng/mL    Oxazepam <25 <25 ng/mL    Lorazepam <25 <25 ng/mL    Methadone <25 <25 ng/mL    EDDP <25 <25 ng/mL    6-Acetylmorphine <25 <25 ng/mL    Codeine <50 <50 ng/mL    Hydrocodone <25 <25 ng/mL    Hydromorphone <25 <25 ng/mL    Morphine  <50 <50 ng/mL    Norhydrocodone <25 <25 ng/mL    Noroxycodone <25 <25 ng/mL    Oxycodone <25 <25 ng/mL    Oxymorphone <25 <25 ng/mL    Fentanyl <2.5 <2.5 ng/mL    Norfentanyl <2.5 <2.5 ng/mL    Tramadol <50 <50 ng/mL    O-Desmethyltramadol <50 <50 ng/mL    Zolpidem <25 <25 ng/mL    Zolpidem Metabolite (ZCA) <25 <25 ng/mL   Screen Opiate/Opioid/Benzo Prescription Compliance    Collection Time: 02/29/24  3:01 PM   Result Value Ref Range    Creatinine, Urine Random 175.7 20.0 - 320.0 mg/dL    Amphetamine Screen, Urine Presumptive Negative Presumptive Negative    Barbiturate Screen, Urine Presumptive Negative Presumptive Negative    Cannabinoid Screen, Urine Presumptive Negative Presumptive Negative    Cocaine Metabolite Screen, Urine Presumptive Negative Presumptive Negative    PCP Screen, Urine Presumptive Negative Presumptive Negative     Results are as expected.         Controlled Substance Agreement:  Date of the Last Agreement: 03/13/24  Reviewed Controlled Substance Agreement including but not limited to the benefits, risks, and alternatives to treatment with a Controlled Substance medication(s).    Lyrica:  What is the patient's goal of therapy?  Help with fibromyalgia pain  Is this being achieved with current treatment?  Yes    Pain Assessment:  No data recorded    Activities of Daily Living:  Is your overall impression that this patient is benefiting (symptom reduction outweighs side effects) from Lyrica therapy? Yes     1. Physical Functioning: Better  2. Family Relationship: Better  3. Social Relationship: Better  4. Mood: Better  5. Sleep Patterns: Better  6. Overall Function:  Better      Marilyn Hennessy MD 09/11/24 12:05 PM

## 2024-09-11 NOTE — PATIENT INSTRUCTIONS
Decrease the hydroxychloroquine to 200mg daily instead of 400mg  We will change your lyrica dosing to twice a day,  Instead of 200mg daily, we will do 150mg twice a day.  It can be increased to 200mg twice a day in the future

## 2024-10-03 ENCOUNTER — HOSPITAL ENCOUNTER (OUTPATIENT)
Dept: VASCULAR MEDICINE | Facility: CLINIC | Age: 69
Discharge: HOME | End: 2024-10-03
Payer: COMMERCIAL

## 2024-10-03 DIAGNOSIS — R09.89 OTHER SPECIFIED SYMPTOMS AND SIGNS INVOLVING THE CIRCULATORY AND RESPIRATORY SYSTEMS: ICD-10-CM

## 2024-10-03 DIAGNOSIS — R23.0 BLUISH SKIN DISCOLORATION: ICD-10-CM

## 2024-10-03 DIAGNOSIS — I73.9 PERIPHERAL VASCULAR DISEASE, UNSPECIFIED (CMS-HCC): ICD-10-CM

## 2024-10-03 DIAGNOSIS — R20.2 PARESTHESIA OF BOTH LOWER EXTREMITIES: ICD-10-CM

## 2024-10-03 PROCEDURE — 93925 LOWER EXTREMITY STUDY: CPT | Performed by: SURGERY

## 2024-10-03 PROCEDURE — 93922 UPR/L XTREMITY ART 2 LEVELS: CPT | Performed by: SURGERY

## 2024-10-03 PROCEDURE — 93922 UPR/L XTREMITY ART 2 LEVELS: CPT

## 2024-10-09 DIAGNOSIS — F43.20 ADJUSTMENT DISORDER, UNSPECIFIED: ICD-10-CM

## 2024-10-09 RX ORDER — TIZANIDINE 2 MG/1
TABLET ORAL
Qty: 180 TABLET | Refills: 0 | Status: SHIPPED | OUTPATIENT
Start: 2024-10-09

## 2024-11-19 ENCOUNTER — LAB (OUTPATIENT)
Dept: LAB | Facility: LAB | Age: 69
End: 2024-11-19
Payer: COMMERCIAL

## 2024-11-19 DIAGNOSIS — Z79.899 OTHER LONG TERM (CURRENT) DRUG THERAPY: Primary | ICD-10-CM

## 2024-11-19 LAB
BASOPHILS # BLD AUTO: 0.05 X10*3/UL (ref 0–0.1)
BASOPHILS NFR BLD AUTO: 0.7 %
EOSINOPHIL # BLD AUTO: 0.19 X10*3/UL (ref 0–0.7)
EOSINOPHIL NFR BLD AUTO: 2.8 %
ERYTHROCYTE [DISTWIDTH] IN BLOOD BY AUTOMATED COUNT: 13.2 % (ref 11.5–14.5)
HCT VFR BLD AUTO: 41.5 % (ref 36–46)
HGB BLD-MCNC: 13.1 G/DL (ref 12–16)
IMM GRANULOCYTES # BLD AUTO: 0.03 X10*3/UL (ref 0–0.7)
IMM GRANULOCYTES NFR BLD AUTO: 0.4 % (ref 0–0.9)
LYMPHOCYTES # BLD AUTO: 1.11 X10*3/UL (ref 1.2–4.8)
LYMPHOCYTES NFR BLD AUTO: 16.5 %
MCH RBC QN AUTO: 30.5 PG (ref 26–34)
MCHC RBC AUTO-ENTMCNC: 31.6 G/DL (ref 32–36)
MCV RBC AUTO: 97 FL (ref 80–100)
MONOCYTES # BLD AUTO: 0.61 X10*3/UL (ref 0.1–1)
MONOCYTES NFR BLD AUTO: 9.1 %
NEUTROPHILS # BLD AUTO: 4.74 X10*3/UL (ref 1.2–7.7)
NEUTROPHILS NFR BLD AUTO: 70.5 %
NRBC BLD-RTO: 0 /100 WBCS (ref 0–0)
PLATELET # BLD AUTO: 324 X10*3/UL (ref 150–450)
RBC # BLD AUTO: 4.3 X10*6/UL (ref 4–5.2)
WBC # BLD AUTO: 6.7 X10*3/UL (ref 4.4–11.3)

## 2024-11-19 PROCEDURE — 82784 ASSAY IGA/IGD/IGG/IGM EACH: CPT

## 2024-11-19 PROCEDURE — 85025 COMPLETE CBC W/AUTO DIFF WBC: CPT

## 2024-11-19 PROCEDURE — 36415 COLL VENOUS BLD VENIPUNCTURE: CPT

## 2024-11-20 LAB — IGG SERPL-MCNC: 1010 MG/DL (ref 700–1600)

## 2024-11-22 NOTE — PROGRESS NOTES
Subjective   Patient ID: Jes Roper is a 69 y.o. female who presents for pessary follow up..  HPI  69-year-old with a history of a #4 incontinence ring pessary refitted with a number for incontinence ring today 3/8/2024 with history of genuine stress urinary incontinence having undergone 10/19/2020 excision of urethral polyp and cystoscopy for history of urinary urgency, stress incontinence, vaginal atrophy with urethral polyp, multiple sclerosis, pelvic floor weakness, and constipation with urinary urgency and frequency.    From Previous note  68-year-old with a history of a #4 incontinence ring pessary refitted with a number for incontinence ring today 3/8/2024 with history of genuine stress urinary incontinence having undergone 10/19/2020 excision of urethral polyp and cystoscopy for history of urinary urgency, stress incontinence, vaginal atrophy with urethral polyp, multiple sclerosis, pelvic floor weakness, and constipation with urinary urgency and frequency.     The patient presents for pessary maintenance, she denies any vaginal complaints, no abnormal bleeding or discharge.     She unfortunately lost her  in April due to liver cancer.  She has been quite sad following this.  She denies any significant depression concerns and is following up with the hospice team.    She feels that her urinary symptoms are well-controlled.    She denies any bowel related complaints, no fecal or flatal incontinence.    She has no other complaints.    From Previous note  68-year-old with a history of a #4 incontinence ring pessary with history of genuine stress urinary incontinence having undergone 10/19/2020 excision of urethral polyp and cystoscopy for history of urinary urgency, stress incontinence, vaginal atrophy with urethral polyp, multiple sclerosis, pelvic floor weakness, and constipation with urinary urgency and frequency.    The patient has been noting worsening urinary urgency and frequency. Gemtesa  "continues to be cost prohibitive and not covered by her insurance. Third line therapy options were discussed at length.     She successfully underwent the MRI for which her pessary was removed, she presents for pessary refitting. Ho    She denies any vaginal complaints, no abnormal bleeding or discharge.     She denies any bowel related complaints, no fecal or flatal incontinence.    She has no other complaints.    From Previous note  This visit was performed through telemedicine  68-year-old with a history of a #4 incontinence ring pessary with history of genuine stress urinary incontinence having undergone 10/19/2020 excision of urethral polyp and cystoscopy for history of urinary urgency, stress incontinence, vaginal atrophy with urethral polyp, multiple sclerosis, pelvic floor weakness, and constipation with urinary urgency and frequency with a  #3 incontinence dish fitted 1/8/2024.     The patient had a sensation of pressure and \"like  was trying to push something out\" after she left the office after her  #3 incontinence dish was fitted 1/8/2024. She took tylenol and the discomfort resolved.  She is overall satisfied with her pessary at this time.    She has been utilizing Gemtesa with significant benefits, she denies any urgency complaints. The medication however is $95 for a 30-day supply.     She denies any vaginal complaints, no abnormal bleeding or discharge.     She denies any bowel related complaints, no fecal or flatal incontinence.    She has no other complaints.    From Previous note  68-year-old with #4 incontinence ring pessary with history of genuine stress urinary incontinence having undergone 10/19/2020 excision of urethral polyp and cystoscopy for history of urinary urgency, stress incontinence, vaginal atrophy with urethral polyp, multiple sclerosis, pelvic floor weakness, and constipation with urinary urgency and frequency.     The patient did not note any changes in her LUTS with the pessary " out. She notes worsening urinary urgency and frequency when she takes her diuretics. She notes daytime frequency associated with her diuretic use. She notes 1-2 episodes of nocturia. Gemtesa samples were provided to her. She denies any UTI like symptoms.     She denies any vaginal complaints, no abnormal bleeding or discharge.     She denies any bowel related complaints, no fecal or flatal incontinence.    She has no other complaints.      From Previous note  68-year-old with #4 incontinence ring pessary with history of genuine stress urinary incontinence having undergone 10/19/2020 excision of urethral polyp and cystoscopy for history of urinary urgency, stress incontinence, vaginal atrophy with urethral polyp, multiple sclerosis, pelvic floor weakness, and constipation with urinary urgency and frequency.     The patient required to have an MRI and was unable to proceed due to the pessary. She presents to get the pessary removed to be able to have the MRI.     She has a fall 10/17 and is noting worsening MS symptoms since then.     She notes worsening urinary urgency and frequency when she takes her diuretics. She notes daytime frequency associated with her diuretic use. She notes 1-2 episodes of nocturia.    She denies any vaginal complaints, no abnormal bleeding or discharge.     She denies any bowel related complaints, no fecal or flatal incontinence.    She has no other complaints.    From Previous note  68-year-old with #4 incontinence ring pessary with history of genuine stress urinary incontinence having undergone 10/19/2020 excision of urethral polyp and cystoscopy for history of urinary urgency, stress incontinence, vaginal atrophy with urethral polyp, multiple sclerosis, pelvic floor weakness, and constipation with urinary urgency and frequency.     She is overall very satisfied with her pessary. She denies any abnormal vaginal bleeding or discharge. She notes improvement in her stress urinary  "incontinence complaints.     She is no longer taking Myrbetriq. \"I have a full plate\". She is presently managing her mother who is 98 and on hospice. She also has had recent increase in her diuretic requirements. She notes daytime frequency associated with her diuretic use. She notes 1-2 episodes of nocturia.     She has no other complaints.     From previous note     67-year-old with #4 incontinence ring pessary with history of genuine stress urinary incontinence having undergone 10/19/2020 excision of urethral polyp and cystoscopy for history of urinary urgency, stress incontinence, vaginal atrophy with urethral polyp, multiple sclerosis, pelvic floor weakness, and constipation with urinary urgency and frequency.     The patient was last seen in December 2022. The patient has not started taking the Myrbetriq as it was cost prohibitive for her. She is switching insurance and will contact again after she is established. She continues to note urinary urgency and incontinence complaints. She continues to note rare stress urinary incontinence and states that she \" some days when its under control and some days she cannot move without leaking\". She denies any UTI like symptoms. She is satisfied from the pessary standpoint.      She denies any bowel related complaints, no fecal or flatal incontinence.     She denies any vaginal complaints, no abnormal vaginal bleeding or discharge. She is utilizing the vaginal estrogen cream.      She has no other complaints.   From previous note  67-year-old with #4 incontinence ring pessary with history of genuine stress urinary incontinence having undergone 10/19/2020 excision of urethral polyp and cystoscopy for history of urinary urgency, stress incontinence, vaginal atrophy with urethral polyp, multiple sclerosis, pelvic floor weakness, and constipation with urinary urgency and frequency presenting today for pessary maintenance.     The patient state she continues to note rare " "stress urinary incontinence. She is overall satisfied with her incontinence ring. She denies any vaginal complaints. She denies any UTI-like symptoms. He does however note episodic urinary urgency, frequency, and incontinence. \"It comes in waves\". She wishes to proceed with Myrbetriq in 2023.     She denies any bowel related complaints, no fecal or flatal incontinence.     She denies any vaginal complaints, no abnormal vaginal bleeding or discharge.     She has no other complaints.      From previous note   67-year-old with history of MS with mixed urinary incontinence and #4 incontinence ring pessary having undergone 10/19/2020 excision of urethral polyp and cystoscopy for history of urethral polyp presenting for pessary follow-up.     She is overall very satisfied with her pessary ring. She denies any UTI-like symptoms. She feels that she is emptying her bladder appropriately. However she has noted an episode of vaginal spotting over the last week. She otherwise denies any abnormal vaginal discharge.     At her last appointment, we discussed her concerns for several episodes of urinary incontinence. However it was unclear whether this was urine or vaginal discharge. She was instructed to proceed with a phenazopyridine challenge. She has been unable to perform this. She does not have any particular lower urinary tract complaints at this time. .      She has a \"very full plate\" as her 97-year-old mother recently moved in. She has not had the opportunity to \"really think about\" her lower urinary tract symptoms.     She has no other complaints.     From previous note  66-year-old presenting for incontinence ring pessary follow-up after 10/19/2020 excision of urethral polyp and cystoscopy for history of urethral polyp, urinary urgency, female stress incontinence, and vaginal atrophy with history of MS.     The patient was refitted with a #4 incontinence ring at her last appointment. She is overall very satisfied with " this. She feels that this has significantly improved her urinary leakage as well as her urgency complaints. She wishes to continue this therapy moving forward.     She denies any UTI-like symptoms.     She has picked up her vaginal estrogen therapy but has not begun this.     She has no other complaints.     From previous note  65-year-old with urethral polyp, urinary urgency, and vaginal atrophy.     The patient continues to utilize her vaginal estrogen therapy. She has not noted any improvement in her urethral polyp complaints. She denies any gross hematuria. She denies any significant changes in her urinary urgency.     She wishes to proceed with definitive surgical management.     She has no other complaints.     From previous note  65-year-old presenting as a referral from Dr. Carvalho with complaints of a urethral mass, gross hematuria, and urinary urgency.     The patient was originally evaluated in March with complaints of vaginal bleeding. She was noted to have urethral prolapse and vaginal atrophy and started on vaginal estrogen cream. Upon reevaluation her prolapse had improved but upon evaluation by Dr. Carvalho was found to have a urethral polyp. Cystoscopy at that time was normal and upper tract imaging demonstrated nonobstructive bilateral nephrolithiasis.     The patient continues to note episodic spotting when wiping. She does note urinary urgency and frequency but denies any urge related incontinence. She does note rare stress urinary incontinence. She notes nocturia up to 3 times. She denies enuresis. She denies a history of chronic urinary tract infections.     She is not sexually active. She does suffer from MS. She denies any vaginal bulge complaints. She denies any abnormal vaginal discharge.     She has episodic diarrhea and constipation. She denies any fecal or flatal incontinence.     She has no other complaints.   Review of Systems  Constitutional: No fever, No chills and No fatigue.   Eyes:  No vision problems and No dryness of the eyes.   ENT: No dry mouth, No hearing loss and No nosebleeds.   Cardiovascular: No chest pain, No palpitations and No orthopnea.   Respiratory: No shortness of breath, No cough and No wheezing.   Gastrointestinal: No abdominal pain, No constipation, No nausea, No diarrhea, No vomiting and No melena.   Genitourinary: As noted in HPI.   Musculoskeletal: No back pain, No myalgias, No muscle weakness, No joint swelling and No leg edema.   Integumentary: No rashes, No skin lesion and No itching.   Neurological: No headache, No numbness and No dizziness.   Psychiatric: No sleep disturbances, No anxiety and No depression.   Endocrine: No hot flashes, No loss of hair and No hirsutism.   Hematologic/Lymphatic: No swollen glands, No tendency for easy bleeding and No tendency for easy bruising.   All other systems have been reviewed and are negative for complaint.        Objective   Physical Exam    PHYSICAL EXAMINATION:  No LMP recorded.  There is no height or weight on file to calculate BMI.  There were no vitals taken for this visit.  General Appearance: well appearing  Neuro: Alert and oriented   HEENT: mucous membranes moist, neck supple  Resp: No respiratory distress, normal work of breathing  MSK: normal range of motion, gait appropriate    After the vagina was treated with lidocaine gel, the #3 incontinence ring was easily removed.  There was a small area of excoriation at the vaginal apex treated with silver nitrate.  The pessary was cleaned and replaced without difficulty.      Assessment/Plan   69-year-old with a history of a #4 incontinence ring pessary refitted with a number for incontinence ring today 3/8/2024 with history of genuine stress urinary incontinence having undergone 10/19/2020 excision of urethral polyp and cystoscopy for history of urinary urgency, stress incontinence, vaginal atrophy with urethral polyp, multiple sclerosis, pelvic floor weakness, and  constipation with urinary urgency and frequency.     The patient's #3 incontinence ring was cleaned and replaced today 8/20/2024 with a small area of excoriation treated with silver nitrate.  We again discussed the importance of continuing vaginal estrogen therapy. We have previously discussed the patient's urodynamics. We specifically discussed therapy for her stress urinary incontinence. We have previously discussed pelvic floor physical therapy, pessary management, and definitive management with a mid urethral sling or Bulkamid therapy. She is overall very satisfied with her incontinence ring.we again discussed urodynamics findings noting terminal contraction noted at 300 cc. We again discussed the importance of timed voiding and fluid management strategies. She had no benefits with oxybutynin therapy in the past.  Her daughter is a pharmacist and had asked about Vesicare and we discussed that this was a medication similar to the oxybutynin she had previously used with minimal benefits.  She had excellent results with her Gemtesa but this does appear to be cost prohibitive at $95 a month.  We again discussed PTNS, Botox, Revi, and InterStim including the various risks of these procedures.  She wishes to continue her present therapy.     2. We again discussed the patient's constipation. We discussed titrating daily MiraLAX therapy. We discussed the importance of daily fiber therapy. She will continue this moving forward.     #3 she will continue her vaginal estrogen therapy 3 times weekly.    4.  The patient will follow-up in December for pessary follow-up.  We will readdress her lower urinary tract symptoms at that time.     ANTWON Pope MD      Scribe Attestation    By signing my name below, Christy MEDINA Scribe attest that this documentation has been prepared under the direction and in the presence of Ajay Pope MD. All medical record entries made by the Scribe were at my direction or  personally dictated by me. I have reviewed the chart and agree that the record accurately reflects my personal performance of the history, physical exam, discussion and plan.

## 2024-12-03 ENCOUNTER — APPOINTMENT (OUTPATIENT)
Dept: UROLOGY | Facility: CLINIC | Age: 69
End: 2024-12-03
Payer: COMMERCIAL

## 2024-12-13 ENCOUNTER — OFFICE VISIT (OUTPATIENT)
Dept: RHEUMATOLOGY | Facility: CLINIC | Age: 69
End: 2024-12-13
Payer: COMMERCIAL

## 2024-12-13 VITALS
BODY MASS INDEX: 37.89 KG/M2 | SYSTOLIC BLOOD PRESSURE: 94 MMHG | HEART RATE: 73 BPM | DIASTOLIC BLOOD PRESSURE: 50 MMHG | WEIGHT: 194 LBS | OXYGEN SATURATION: 100 %

## 2024-12-13 DIAGNOSIS — Z79.899 ENCOUNTER FOR LONG-TERM (CURRENT) USE OF MEDICATIONS: ICD-10-CM

## 2024-12-13 DIAGNOSIS — M79.7 FIBROMYALGIA: Primary | ICD-10-CM

## 2024-12-13 DIAGNOSIS — M15.9 GENERALIZED OSTEOARTHRITIS OF MULTIPLE SITES: ICD-10-CM

## 2024-12-13 PROCEDURE — 3060F POS MICROALBUMINURIA REV: CPT | Performed by: INTERNAL MEDICINE

## 2024-12-13 PROCEDURE — 4010F ACE/ARB THERAPY RXD/TAKEN: CPT | Performed by: INTERNAL MEDICINE

## 2024-12-13 PROCEDURE — G2211 COMPLEX E/M VISIT ADD ON: HCPCS | Performed by: INTERNAL MEDICINE

## 2024-12-13 PROCEDURE — 3078F DIAST BP <80 MM HG: CPT | Performed by: INTERNAL MEDICINE

## 2024-12-13 PROCEDURE — 99213 OFFICE O/P EST LOW 20 MIN: CPT | Performed by: INTERNAL MEDICINE

## 2024-12-13 PROCEDURE — 1159F MED LIST DOCD IN RCRD: CPT | Performed by: INTERNAL MEDICINE

## 2024-12-13 PROCEDURE — 3074F SYST BP LT 130 MM HG: CPT | Performed by: INTERNAL MEDICINE

## 2024-12-13 PROCEDURE — 1123F ACP DISCUSS/DSCN MKR DOCD: CPT | Performed by: INTERNAL MEDICINE

## 2024-12-13 PROCEDURE — 3049F LDL-C 100-129 MG/DL: CPT | Performed by: INTERNAL MEDICINE

## 2024-12-13 RX ORDER — OFATUMUMAB 20 MG/.4ML
INJECTION, SOLUTION SUBCUTANEOUS
COMMUNITY

## 2024-12-13 ASSESSMENT — ENCOUNTER SYMPTOMS
SHORTNESS OF BREATH: 0
FATIGUE: 1
ABDOMINAL PAIN: 0

## 2024-12-13 NOTE — PROGRESS NOTES
Subjective   Patient ID: Jes Roper is a 69 y.o. female who presents for Follow-up.  HPI  Patient with history of fibromyalgia and multiple sclerosis with features of optic neuritis.  Also has a history of cardiomyopathy with chronic systolic heart failure, hypertension has been on various medications such as Cymbalta, Effexor, Lamictal, Lodine, naltrexone, gabapentin, Paxil, Pristiq, Prozac, Relafen, Savella, Skelaxin, Ultram, Wellbutrin, Zanaflex, Vioxx.    Had her last visit in September we had increased her pregabalin to 150 mg twice daily.  We also decreased her hydroxy chloroquine to 200 mg daily.  Her daughter is filling her medications.  She is uncertain if there is any benefit from the increase.  She still has a lot of pain.  She has been cleaning out her 's things and found some things.  Still is working through her grief and does have a lot of chronic pain.  She was hoping to do some type of exercise, specifically a water class and is looking to see if it is covered by Silver sneakers.  Review of Systems   Constitutional:  Positive for fatigue.   Respiratory:  Negative for shortness of breath.    Cardiovascular:  Negative for chest pain.   Gastrointestinal:  Negative for abdominal pain.   Musculoskeletal:         Per HPI   Neurological:         History of MS       Objective   Physical Exam  GEN: NAD A&O x3 appropriate affect, rollator  EYES: no conjunctival redness, eyelids normal  SKIN: No rashes seen on exposed skin  TENDER POINTS: 14/18   MSK:  tender b/l knees  Right shoulder replacement no evidence of synovitis in the joints of her hands wrist elbows  Assessment/Plan     Fibromyalgia and osteoarthritis. She has a mild elevation of her CRP of uncertain clinical significance. Since being on the Plaquenil she has had some improvement of her symptoms.   -Currently taking 150 mg of Lyrica twice a day and did not make a huge difference from her taking 200 mg daily.  She has been taking all  of her medications that her daughter has been putting into her pillbox.    -Also currently on hydroxychloroquine  -We discussed about increase in activity and joining [employer] sneakers.  She showed me a picture of all the modalities for her treatment and we discussed about her doing physical therapy but she wants to try to go in the pool and do Silver sneakers.  She does go to grief groups and also has a therapist.    Will have her come back in 3 months     OARRS:  No data recorded  I have personally reviewed the OARRS report for Jes Roper. I have considered the risks of abuse, dependence, addiction and diversion    Is the patient prescribed a combination of a benzodiazepine and opioid?    Last Urine Drug Screen / ordered today:   Recent Results (from the past 8760 hours)   Confirmation Opiate/Opioid/Benzo Prescription Compliance    Collection Time: 02/29/24  3:01 PM   Result Value Ref Range    Clonazepam <25 <25 ng/mL    7-Aminoclonazepam <25 <25 ng/mL    Alprazolam <25 <25 ng/mL    Alpha-Hydroxyalprazolam <25 <25 ng/mL    Midazolam <25 <25 ng/mL    Alpha-Hydroxymidazolam <25 <25 ng/mL    Chlordiazepoxide <25 <25 ng/mL    Diazepam <25 <25 ng/mL    Nordiazepam <25 <25 ng/mL    Temazepam <25 <25 ng/mL    Oxazepam <25 <25 ng/mL    Lorazepam <25 <25 ng/mL    Methadone <25 <25 ng/mL    EDDP <25 <25 ng/mL    6-Acetylmorphine <25 <25 ng/mL    Codeine <50 <50 ng/mL    Hydrocodone <25 <25 ng/mL    Hydromorphone <25 <25 ng/mL    Morphine  <50 <50 ng/mL    Norhydrocodone <25 <25 ng/mL    Noroxycodone <25 <25 ng/mL    Oxycodone <25 <25 ng/mL    Oxymorphone <25 <25 ng/mL    Fentanyl <2.5 <2.5 ng/mL    Norfentanyl <2.5 <2.5 ng/mL    Tramadol <50 <50 ng/mL    O-Desmethyltramadol <50 <50 ng/mL    Zolpidem <25 <25 ng/mL    Zolpidem Metabolite (ZCA) <25 <25 ng/mL   Screen Opiate/Opioid/Benzo Prescription Compliance    Collection Time: 02/29/24  3:01 PM   Result Value Ref Range    Creatinine, Urine Random 175.7 20.0 - 320.0  mg/dL    Amphetamine Screen, Urine Presumptive Negative Presumptive Negative    Barbiturate Screen, Urine Presumptive Negative Presumptive Negative    Cannabinoid Screen, Urine Presumptive Negative Presumptive Negative    Cocaine Metabolite Screen, Urine Presumptive Negative Presumptive Negative    PCP Screen, Urine Presumptive Negative Presumptive Negative     Results are as expected.         Controlled Substance Agreement:  Date of the Last Agreement: 03/13/24  Reviewed Controlled Substance Agreement including but not limited to the benefits, risks, and alternatives to treatment with a Controlled Substance medication(s).    Lyrica:  What is the patient's goal of therapy?  Help with fibromyalgia pain  Is this being achieved with current treatment?  Yes    Pain Assessment:  No data recorded    Activities of Daily Living:  Is your overall impression that this patient is benefiting (symptom reduction outweighs side effects) from Lyrica therapy? Yes     1. Physical Functioning: Better  2. Family Relationship: Better  3. Social Relationship: Better  4. Mood: Better  5. Sleep Patterns: Better  6. Overall Function: Better      Marilyn Hennessy MD 12/13/24 11:10 AM

## 2024-12-18 ENCOUNTER — APPOINTMENT (OUTPATIENT)
Dept: PRIMARY CARE | Facility: CLINIC | Age: 69
End: 2024-12-18
Payer: COMMERCIAL

## 2024-12-18 ENCOUNTER — LAB (OUTPATIENT)
Dept: LAB | Facility: LAB | Age: 69
End: 2024-12-18
Payer: COMMERCIAL

## 2024-12-18 ENCOUNTER — HOSPITAL ENCOUNTER (OUTPATIENT)
Dept: RADIOLOGY | Facility: CLINIC | Age: 69
Discharge: HOME | End: 2024-12-18
Payer: COMMERCIAL

## 2024-12-18 VITALS — WEIGHT: 188 LBS | SYSTOLIC BLOOD PRESSURE: 102 MMHG | DIASTOLIC BLOOD PRESSURE: 67 MMHG | BODY MASS INDEX: 36.72 KG/M2
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
## 2024-12-18 DIAGNOSIS — F43.20 ADJUSTMENT DISORDER, UNSPECIFIED: ICD-10-CM

## 2024-12-18 DIAGNOSIS — F43.21 GRIEVING: ICD-10-CM

## 2024-12-18 DIAGNOSIS — R20.2 PARESTHESIA OF BOTH LOWER EXTREMITIES: Primary | ICD-10-CM

## 2024-12-18 DIAGNOSIS — R20.2 PARESTHESIA OF BOTH LOWER EXTREMITIES: ICD-10-CM

## 2024-12-18 DIAGNOSIS — M25.531 RIGHT WRIST PAIN: ICD-10-CM

## 2024-12-18 DIAGNOSIS — E87.6 HYPOKALEMIA: ICD-10-CM

## 2024-12-18 DIAGNOSIS — G89.29 OTHER CHRONIC PAIN: ICD-10-CM

## 2024-12-18 DIAGNOSIS — E11.59 HYPERTENSION ASSOCIATED WITH DIABETES (MULTI): ICD-10-CM

## 2024-12-18 DIAGNOSIS — I15.2 HYPERTENSION ASSOCIATED WITH DIABETES (MULTI): ICD-10-CM

## 2024-12-18 LAB
ALBUMIN SERPL BCP-MCNC: 4.1 G/DL (ref 3.4–5)
ALP SERPL-CCNC: 149 U/L (ref 33–136)
ALT SERPL W P-5'-P-CCNC: 21 U/L (ref 7–45)
ANION GAP SERPL CALC-SCNC: 10 MMOL/L (ref 10–20)
AST SERPL W P-5'-P-CCNC: 19 U/L (ref 9–39)
BILIRUB SERPL-MCNC: 0.4 MG/DL (ref 0–1.2)
BUN SERPL-MCNC: 22 MG/DL (ref 6–23)
CALCIUM SERPL-MCNC: 9.4 MG/DL (ref 8.6–10.6)
CHLORIDE SERPL-SCNC: 106 MMOL/L (ref 98–107)
CO2 SERPL-SCNC: 31 MMOL/L (ref 21–32)
CREAT SERPL-MCNC: 1.09 MG/DL (ref 0.5–1.05)
EGFRCR SERPLBLD CKD-EPI 2021: 56 ML/MIN/1.73M*2
GLUCOSE SERPL-MCNC: 86 MG/DL (ref 74–99)
POTASSIUM SERPL-SCNC: 4.4 MMOL/L (ref 3.5–5.3)
PROT SERPL-MCNC: 6.9 G/DL (ref 6.4–8.2)
SODIUM SERPL-SCNC: 143 MMOL/L (ref 136–145)
TSH SERPL-ACNC: 1.6 MIU/L (ref 0.44–3.98)

## 2024-12-18 PROCEDURE — 1123F ACP DISCUSS/DSCN MKR DOCD: CPT | Performed by: INTERNAL MEDICINE

## 2024-12-18 PROCEDURE — 80053 COMPREHEN METABOLIC PANEL: CPT

## 2024-12-18 PROCEDURE — 3078F DIAST BP <80 MM HG: CPT | Performed by: INTERNAL MEDICINE

## 2024-12-18 PROCEDURE — 73110 X-RAY EXAM OF WRIST: CPT | Mod: RIGHT SIDE | Performed by: RADIOLOGY

## 2024-12-18 PROCEDURE — 3074F SYST BP LT 130 MM HG: CPT | Performed by: INTERNAL MEDICINE

## 2024-12-18 PROCEDURE — 1036F TOBACCO NON-USER: CPT | Performed by: INTERNAL MEDICINE

## 2024-12-18 PROCEDURE — 99214 OFFICE O/P EST MOD 30 MIN: CPT | Performed by: INTERNAL MEDICINE

## 2024-12-18 PROCEDURE — 84443 ASSAY THYROID STIM HORMONE: CPT

## 2024-12-18 PROCEDURE — 4010F ACE/ARB THERAPY RXD/TAKEN: CPT | Performed by: INTERNAL MEDICINE

## 2024-12-18 PROCEDURE — 3049F LDL-C 100-129 MG/DL: CPT | Performed by: INTERNAL MEDICINE

## 2024-12-18 PROCEDURE — 3060F POS MICROALBUMINURIA REV: CPT | Performed by: INTERNAL MEDICINE

## 2024-12-18 PROCEDURE — 73110 X-RAY EXAM OF WRIST: CPT | Mod: RT

## 2024-12-18 PROCEDURE — 1159F MED LIST DOCD IN RCRD: CPT | Performed by: INTERNAL MEDICINE

## 2024-12-18 PROCEDURE — 36415 COLL VENOUS BLD VENIPUNCTURE: CPT

## 2024-12-18 RX ORDER — TIZANIDINE 2 MG/1
4 TABLET ORAL EVERY 8 HOURS PRN
Qty: 180 TABLET | Refills: 0 | Status: SHIPPED | OUTPATIENT
Start: 2024-12-18 | End: 2025-12-18

## 2024-12-18 RX ORDER — POTASSIUM CHLORIDE 20 MEQ/1
20 TABLET, EXTENDED RELEASE ORAL DAILY
Qty: 90 TABLET | Refills: 1 | Status: SHIPPED | OUTPATIENT
Start: 2024-12-18 | End: 2025-06-16

## 2024-12-18 ASSESSMENT — ENCOUNTER SYMPTOMS
DEPRESSION: 1
LOSS OF SENSATION IN FEET: 0
OCCASIONAL FEELINGS OF UNSTEADINESS: 0

## 2024-12-18 NOTE — PATIENT INSTRUCTIONS
I applaud you for signing up the [employer] , that is a new experience for you     You can still ice your hip and ice       Ways to Help Prevent Falls at Home    Quick Tips   ? Ask for help if you need it. Most people want to help!   ? Get up slowly after sitting or laying down   ? Wear a medical alert device or keep cell phone in your pocket   ? Use night lights, especially areas near a bathroom   ? Keep the items you use often within reach on a small stool or end table   ? Use an assistive device such as walker or cane, as directed by provider/physical therapy   ? Use a non-slip mat and grab bars in your bathroom. Look for home health sections for best options     Other Areas to Focus On   ? Exercise and nutrition: Regular exercise or taking a falls prevention class are great ways improve strength and balance. Don’t forget to stay hydrated and bring a snack!   ? Medicine side effects: Some medicines can make you sleepy or dizzy, which could cause a fall. Ask your healthcare provider about the side effects your medicines could cause. Be sure to let them know if you take any vitamins or supplements as well.   ? Tripping hazards: Remove items you could trip on, such as loose mats, rugs, cords, and clutter. Wear closed toe shoes with rubber soles.   ? Health and wellness: Get regular checkups with your healthcare provider, plus routine vision and hearing screenings. Talk with your healthcare provider about:   o Your medicines and the possible side effects - bring them in a bag if that is easier!   o Problems with balance or feeling dizzy   o Ways to promote bone health, such as Vitamin D and calcium supplements   o Questions or concerns about falling     *Ask your healthcare team if you have questions     ©[facility], 2022

## 2024-12-18 NOTE — PROGRESS NOTES
Subjective   Patient ID: Jes Roper is a 69 y.o. female who presents for FUV.    HPI  Patient in for a visit  She had been in contact with Dr Kiya Brambila once a month   Seeing [facility] psychiatry fellow regularly once a week   Her children are there with helping her   She is worried about what her mother did to her and does not want that to happen   Review of Systems    Previous history  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    Abnormal serum cholesterol    Disorder of pigmentation, unspecified 03/22/2022    Discoloration of skin of toe    Dizziness and giddiness 07/01/2022    Dizziness    Dorsalgia, unspecified 12/30/2022    Back pain    Edema, unspecified 07/17/2019    Edema    Encounter for fitting and adjustment of other specified devices 01/19/2021    Fitting and adjustment of pessary    Encounter for general adult medical examination without abnormal findings 03/22/2022    Encounter for Medicare annual wellness exam    Encounter for gynecological examination (general) (routine) without abnormal findings 12/05/2018    Visit for gynecologic examination    Encounter for gynecological examination (general) (routine) without abnormal findings 03/17/2016    Encounter for gynecological examination without abnormal finding    Encounter for other screening for malignant neoplasm of breast 11/01/2019    Encounter for screening for malignant neoplasm of breast    Encounter for other screening for malignant neoplasm of breast 03/22/2022    Breast screening    Fibromyalgia  2022    Fibromyalgia    Hyperlipidemia     Hypertension     Hypothyroidism, unspecified 2022    Hypothyroidism    Local infection of the skin and subcutaneous tissue, unspecified 2019    Toe infection    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[facility])     Multiple sclerosis (Multi) 2022    Multiple sclerosis    Obesity, unspecified 2016    Mild obesity    Other age-related incipient cataract, bilateral 2022    Other age-related incipient cataract of both eyes    Other chronic pain 2022    Chronic pain    Other specified disorders of urethra 2020    Prolapse of urethra    Pain in right knee 2022    Right knee pain    Peripheral vascular disease, unspecified ([facility]) 2022    Arterial insufficiency of lower extremity    Personal history of other diseases of urinary system 2020    History of prolapse of bladder    Personal history of other endocrine, nutritional and metabolic disease 2014    History of hypothyroidism    Personal history of other specified conditions 2022    History of chronic cough    Sciatica, right side 2019    Right sciatic nerve pain    Sleep apnea, unspecified 04/10/2017    Sleep apnea    Torticollis 2022    Torticollis    Tremor, unspecified 2022    Tremor of both hands    Unspecified cataract     Cataracts, both eyes    Unspecified skin changes 2022    Skin change    Unspecified symptoms and signs involving the genitourinary system 2021    Urinary symptom or sign     Past Surgical History:   Procedure Laterality Date     SECTION, CLASSIC  10/03/2018     Section    GALLBLADDER SURGERY  2020    Gallbladder Surgery    OTHER SURGICAL HISTORY  2021    Shoulder replacement    OTHER SURGICAL HISTORY  2020    Exploratory laparotomy    OTHER SURGICAL HISTORY  2020    Urethroplasty     Social History     Tobacco Use    Smoking status: Never    Smokeless tobacco: Never    Vaping Use    Vaping status: Never Used   Substance Use Topics    Alcohol use: Never    Drug use: Never     Family History   Problem Relation Name Age of Onset    Diabetes Mother      Hypertension Mother      Skin cancer Mother      Other (HEPATIC CIRRHOSIS [Other]) Father      No Known Problems Sister      Other (AMD) Brother      Retinal detachment Daughter Adilia     Retinal detachment Son Aureliano     Cervical cancer Mother's Sister      Cervical cancer Maternal Grandmother       Allergies   Allergen Reactions    Naltrexone Unknown    Penicillin Unknown     Current Outpatient Medications   Medication Instructions    aspirin 81 mg, Every 12 hours    baclofen (Lioresal) 10 mg tablet Take 0.5 tablets (5 mg) by mouth once daily at bedtime.    buPROPion XL (WELLBUTRIN XL) 300 mg, Daily RT    cholecalciferol (VITAMIN D-3) 2,000 Units, Daily RT    cyanocobalamin (VITAMIN B-12) 100 mcg, Daily    dapagliflozin propanediol (FARXIGA) 10 mg, oral, Daily    diazePAM (VALIUM) 10 mg, Daily PRN    DULoxetine (CYMBALTA) 60 mg, Daily RT    estradiol (Estrace) 0.01 % (0.1 mg/gram) vaginal cream Place a dime sized amount vaginally  3 times a week    fluticasone (Flonase) 50 mcg/actuation nasal spray 2 sprays, Daily RT    hydroxychloroquine (PLAQUENIL) 200 mg, oral, Daily RT    hyoscyamine ER (Levbid) 0.375 mg 12 hr tablet 1 tablet, Daily    levothyroxine (SYNTHROID, LEVOXYL) 50 mcg, oral, Daily, as directed    losartan (COZAAR) 100 mg, oral, Daily RT    metoprolol succinate XL (TOPROL-XL) 200 mg, oral, Daily    modafinil (PROVIGIL) 200 mg, oral, 2 times daily    multivit-min-iron-FA-vit K-lut (Centrum Silver Women) 8 mg iron-400 mcg-50 mcg tablet 1 tablet, Daily    ofatumumab (Kesimpta Pen) 20 mg/0.4 mL injection     omeprazole (PRILOSEC) 40 mg, oral, Daily before breakfast    potassium chloride CR (Klor-Con M20) 20 mEq ER tablet 20 mEq, oral, Daily, Do not crush or chew.    pregabalin (LYRICA) 150 mg, oral, 2 times daily     rosuvastatin (CRESTOR) 5 mg, oral, Daily    spironolactone (ALDACTONE) 25 mg, oral, Daily    tiZANidine (Zanaflex) 2 mg tablet TAKE 1 TABLET BY MOUTH EVERY 8 HOURS AS NEEDED (caution for sedation)    torsemide (DEMADEX) 20 mg, oral, Daily    vibegron 75 mg, oral, Daily       Objective       Physical Exam  Vital Signs: as recorded above  General: Well groomed, well nourished   Orientation:  Alert , oriented to time, place , and person   Mood and Affect:  Cooperative , no apparent distress normal affect  Skin: Good color, good turgor  Eyes: Extra ocular muscle movements intact, anicteric sclerae  Neck: Supple, full range of movement  Chest: Normal breath sounds, normal chest wall exam, symmetric, good air entry, clear to auscultation  Heart: Regular rate and rhythm, without murmur, gallop, or rubs  BACK:  no CTLS spine tenderness, no flank tenderness  Extremities: full range of movement  bilateral UE and bilateral LE,  no lower extremity edema  Neurological: Alert, oriented, cranial nerves II-XII grossly intact except for visual acuity  Sensation:  Intact   Gait: normal steady      Assessment/Plan   Jes Roper is a 69 y.o. female who presents for the concerns below:    Problem List Items Addressed This Visit    None  Mobility     Grieving the loss of goodwill and finances that were unfounded when her     , the loss of her purpose   She did get a [employer] that can only be used within [place] if she does start thinking bad thoughts     S/p fall / hit her right hip thigh and right wrist which is still swollen , bilateral knees going to ortho     Urinary incontinence seeing Dr Pope  before the end of the month she will make an appt with Ashley Shay while Dr Pope is away on sabbatical     HYPOKALEMIA level good on supplementation , continue potassium , refills utd , no recent history of vomiting or diarrhea         Discussed with:   Return in :  end of March early April     Portions of  this note were generated using digital voice recognition software, and may contain grammatical errors       Ambrosio Gallegos MD  12/18/24  3:01 PMPatient was identified as a fall risk. Risk prevention instructions provided.

## 2024-12-19 ENCOUNTER — APPOINTMENT (OUTPATIENT)
Dept: CARDIOLOGY | Facility: CLINIC | Age: 69
End: 2024-12-19
Payer: COMMERCIAL

## 2024-12-19 VITALS
BODY MASS INDEX: 37.51 KG/M2 | WEIGHT: 191.06 LBS | SYSTOLIC BLOOD PRESSURE: 82 MMHG | HEIGHT: 60 IN | DIASTOLIC BLOOD PRESSURE: 47 MMHG | HEART RATE: 80 BPM | OXYGEN SATURATION: 93 %

## 2024-12-19 DIAGNOSIS — I50.22 CHRONIC SYSTOLIC HEART FAILURE: Primary | ICD-10-CM

## 2024-12-19 DIAGNOSIS — I10 ESSENTIAL HYPERTENSION, BENIGN: ICD-10-CM

## 2024-12-19 DIAGNOSIS — R06.09 DYSPNEA ON EXERTION: ICD-10-CM

## 2024-12-19 PROCEDURE — 3008F BODY MASS INDEX DOCD: CPT | Performed by: INTERNAL MEDICINE

## 2024-12-19 PROCEDURE — 3060F POS MICROALBUMINURIA REV: CPT | Performed by: INTERNAL MEDICINE

## 2024-12-19 PROCEDURE — 1159F MED LIST DOCD IN RCRD: CPT | Performed by: INTERNAL MEDICINE

## 2024-12-19 PROCEDURE — 1123F ACP DISCUSS/DSCN MKR DOCD: CPT | Performed by: INTERNAL MEDICINE

## 2024-12-19 PROCEDURE — 1160F RVW MEDS BY RX/DR IN RCRD: CPT | Performed by: INTERNAL MEDICINE

## 2024-12-19 PROCEDURE — 4010F ACE/ARB THERAPY RXD/TAKEN: CPT | Performed by: INTERNAL MEDICINE

## 2024-12-19 PROCEDURE — 1125F AMNT PAIN NOTED PAIN PRSNT: CPT | Performed by: INTERNAL MEDICINE

## 2024-12-19 PROCEDURE — 3049F LDL-C 100-129 MG/DL: CPT | Performed by: INTERNAL MEDICINE

## 2024-12-19 PROCEDURE — 99214 OFFICE O/P EST MOD 30 MIN: CPT | Performed by: INTERNAL MEDICINE

## 2024-12-19 PROCEDURE — 3074F SYST BP LT 130 MM HG: CPT | Performed by: INTERNAL MEDICINE

## 2024-12-19 PROCEDURE — 1036F TOBACCO NON-USER: CPT | Performed by: INTERNAL MEDICINE

## 2024-12-19 PROCEDURE — 93005 ELECTROCARDIOGRAM TRACING: CPT | Performed by: INTERNAL MEDICINE

## 2024-12-19 PROCEDURE — G2211 COMPLEX E/M VISIT ADD ON: HCPCS | Performed by: INTERNAL MEDICINE

## 2024-12-19 PROCEDURE — 3078F DIAST BP <80 MM HG: CPT | Performed by: INTERNAL MEDICINE

## 2024-12-19 RX ORDER — LOSARTAN POTASSIUM 50 MG/1
50 TABLET ORAL
Qty: 90 TABLET | Refills: 3 | Status: SHIPPED | OUTPATIENT
Start: 2024-12-19 | End: 2024-12-19 | Stop reason: SDUPTHER

## 2024-12-19 RX ORDER — SPIRONOLACTONE 25 MG/1
25 TABLET ORAL DAILY
Qty: 90 TABLET | Refills: 3 | Status: SHIPPED | OUTPATIENT
Start: 2024-12-19

## 2024-12-19 RX ORDER — LOSARTAN POTASSIUM 50 MG/1
50 TABLET ORAL
Qty: 90 TABLET | Refills: 3 | Status: SHIPPED | OUTPATIENT
Start: 2024-12-19

## 2024-12-19 ASSESSMENT — ENCOUNTER SYMPTOMS
OCCASIONAL FEELINGS OF UNSTEADINESS: 1
DEPRESSION: 1
LOSS OF SENSATION IN FEET: 1

## 2024-12-19 ASSESSMENT — PATIENT HEALTH QUESTIONNAIRE - PHQ9
2. FEELING DOWN, DEPRESSED OR HOPELESS: SEVERAL DAYS
1. LITTLE INTEREST OR PLEASURE IN DOING THINGS: SEVERAL DAYS
10. IF YOU CHECKED OFF ANY PROBLEMS, HOW DIFFICULT HAVE THESE PROBLEMS MADE IT FOR YOU TO DO YOUR WORK, TAKE CARE OF THINGS AT HOME, OR GET ALONG WITH OTHER PEOPLE: NOT DIFFICULT AT ALL
SUM OF ALL RESPONSES TO PHQ9 QUESTIONS 1 AND 2: 2

## 2024-12-19 ASSESSMENT — PAIN SCALES - GENERAL: PAINLEVEL_OUTOF10: 4

## 2024-12-19 NOTE — PROGRESS NOTES
"Daily progress note    Chief complaint   Doing better  No new complaints    History of present illness  87-year-old white female with history of hypertension osteoarthritis gout glaucoma chronic kidney disease stage III and gastroesophageal disease who has had recent left ankle fracture who underwent open reduction internal fixation in May 2021[facility] with left lower extremity swelling pain for last few days.  Patient denies any chest pain shortness of breath.  Patient denies any fever chills cough congestion night sweats or weight loss weight gain.  Patient work-up in ER revealed acute extensive left lower extreme DVT admit for management.     REVIEW OF SYSTEMS  Unremarkable     PHYSICAL EXAM   Blood pressure 100/61, pulse 69, temperature 98.1 °F (36.7 °C), temperature source Oral, resp. rate 16, height 157.5 cm (62\"), weight 47.9 kg (105 lb 11.2 oz), SpO2 99 %.    GENERAL: Chronically ill-appearing, nontoxic, does appear uncomfortable  HENT: nares patent  EYES: no scleral icterus  CV: regular rhythm, regular rate, left lower extremity swelling  RESPIRATORY: normal effort, no rubs murmurs gallops   ABDOMEN: soft, nontender bowel sounds positive  MUSCULOSKELETAL: Soft tissue swelling left ankle. Increased pain with extension and flexion of the left ankle.   NEURO: alert, moves all extremities, follows commands  SKIN: warm, dry, no obvious rash     LAB RESULTS  Lab Results (last 24 hours)     Procedure Component Value Units Date/Time    Basic Metabolic Panel [[id number]]  (Abnormal) Collected: 08/13/21 0507    Specimen: Blood Updated: 08/13/21 0647     Glucose 87 mg/dL      BUN 20 mg/dL      Creatinine 0.99 mg/dL      Sodium 139 mmol/L      Potassium 3.9 mmol/L      Chloride 104 mmol/L      CO2 24.6 mmol/L      Calcium 9.4 mg/dL      eGFR Non African Amer 53 mL/min/1.73      BUN/Creatinine Ratio 20.2     Anion Gap 10.4 mmol/L     Narrative:      GFR Normal >60  Chronic Kidney " Subjective   Jes Roper is a 69 y.o. female who presents to the [facility]  for follow up of chronic systolic heart failure and chronic exertional dyspnea. Last seen in 2024.     Since our last visit, Ms. Roper has stable NYHA class II symptoms with exertional dyspnea with climbing 1 flight of stairs, walking 3-5 minutes at a time (up from walking 1-2 aisle at the grocery store prior to starting Farxiga 10 mg a day in 2020). Has been sedentary since her   of liver cancer in July. Seeing a therapist for bereavement.    Her leg edema is stable compared to prior visit. Now trace KAT taking torsemide 10 mg a day.     Had fall last week and now wearing a right hand brace after injury.   Decreased mobility since that episode. No syncope. Has fibromyalgia and MS with chronic pains in the legs and shoulders most prominently R sciatica pain. These are variable day-to-day but on average worse than 4 months ago.     No chest pain, PND, orthopnea, KAT, palpitations, syncope, or claudication. Has episodes of fingers/toes turning blue (no formal diagnosis of Raynaud's phenomenon).      Repeat TTE in 2019 showed stable LV EF 40%.     Dyspnea work up:  1. 2018 TTE: LV EF ~40%, no LVH (LVMI 80 gm//m2), impaired relaxation diastology (E/e' 8), normal LA size, normal RV/RA, trivial AI, trace MR, trace TR, unable to estimate RVSP.  2. 2017 PFTs: FEV1 2.11 L (93% pred); FVC 2.63 L (89% pred), FEV1/FVC ratio 80% (103% pred), DLCO 13.46 (66% pred), DLCO/VA 3.87 (85%) diffusion capacity mildly reduced; TLC 4.69 L (106% pred), RV/TLC ratio 45% (normal), RV 2.13 L (121% pred).     Past Medical History:  1. Chronic systolic heart failure: 2018 LV EF 45%  2. Multiple sclerosis  3. Fibromyalgia  4. Hypertension  5. GHULAM on CPAP  6. Bereavement     Social History:  Non-smoker.  ( passed in 2024 since our last visit)     Family History:  No premature CAD  Disease <60  Kidney Failure <15      CBC & Differential [[id number]]  (Abnormal) Collected: 08/13/21 0507    Specimen: Blood Updated: 08/13/21 0616    Narrative:      The following orders were created for panel order CBC & Differential.  Procedure                               Abnormality         Status                     ---------                               -----------         ------                     CBC Auto Differential[[id number]]        Abnormal            Final result                 Please view results for these tests on the individual orders.    CBC Auto Differential [[id number]]  (Abnormal) Collected: 08/13/21 0507    Specimen: Blood Updated: 08/13/21 0616     WBC 8.71 10*3/mm3      RBC 3.25 10*6/mm3      Hemoglobin 9.4 g/dL      Hematocrit 28.8 %      MCV 88.6 fL      MCH 28.9 pg      MCHC 32.6 g/dL      RDW 16.4 %      RDW-SD 53.6 fl      MPV 12.0 fL      Platelets 276 10*3/mm3      Neutrophil % 57.5 %      Lymphocyte % 28.6 %      Monocyte % 9.2 %      Eosinophil % 3.7 %      Basophil % 0.5 %      Immature Grans % 0.5 %      Neutrophils, Absolute 5.02 10*3/mm3      Lymphocytes, Absolute 2.49 10*3/mm3      Monocytes, Absolute 0.80 10*3/mm3      Eosinophils, Absolute 0.32 10*3/mm3      Basophils, Absolute 0.04 10*3/mm3      Immature Grans, Absolute 0.04 10*3/mm3      nRBC 0.0 /100 WBC         Imaging Results (Last 24 Hours)     ** No results found for the last 24 hours. **             Interpretation Summary    · Acute left lower extremity deep vein thrombosis noted in the common femoral.  · All other left sided veins appeared normal.          Current Facility-Administered Medications:   •  apixaban (ELIQUIS) tablet 5 mg, 5 mg, Oral, Q12H, Ronnie Krueger MD, 5 mg at 08/13/21 0838  •  brimonidine (ALPHAGAN) 0.2 % ophthalmic solution 1 drop, 1 drop, Both Eyes, TID, Ronnie Krueger MD, 1 drop at 08/13/21 0838  •  castor oil-balsam peru (VENELEX) ointment, , Topical, Q12H, Ronnie Krueger MD, 5 g at 08/13/21  in 1st degree relatives ( 55 years of age for male relatives, 65 years of age for female relatives)     Review of Systems    A 14 point review of systems was asked. All questions were negative except for pertinent positives listed in the HPI.      Objective   Physical Exam  BP Readings from Last 3 Encounters:   24 (!) 82/47   24 102/67   24 94/50      Wt Readings from Last 3 Encounters:   24 86.7 kg (191 lb 1 oz)   24 85.3 kg (188 lb)   24 88 kg (194 lb)      BMI: Estimated body mass index is 37.31 kg/m² as calculated from the following:    Height as of this encounter: 1.524 m (5').    Weight as of this encounter: 86.7 kg (191 lb 1 oz).  BSA: Estimated body surface area is 1.92 meters squared as calculated from the following:    Height as of this encounter: 1.524 m (5').    Weight as of this encounter: 86.7 kg (191 lb 1 oz).    General: no acute distress  HEENT: EOMI, no scleral icterus.  Lungs: Clear to auscultation bilaterally without wheezing, rales, or rhonchi.  Cardiovascular: Regular rhythm and rate. Normal S1 and S2. No murmurs, rubs, or gallops are appreciated. JVP normal.  Abdomen: Soft, nontender, nondistended. Bowel sounds present.  Extremities: Warm and well perfused with equal 2+ pulses bilaterally.  No edema present.  Neurologic: Alert and oriented x3.    I have personally reviewed the following images and laboratory findings:  Last echocardiogram:   Most recent echo, 2019: LV EF ~40%, no LVH (LVMI 70 gm/m2), impaired relaxation diastology (E/e' 11.8), normal LA size (LYNN 27 ml/m2), normal RV/RA, trivial AI, trace MR/TR, unable to estimate RVSP.     Last cath / stress test / CACS:  2019 CT chest: Mild coronary artery calcifications present    Most recent EC2023 EC2022 ECG: Sinus rhythm, 81 bpm, Normal ECG. Personally reviewed in office.     Lab Results   Component Value Date    CHOL 194 2024    CHOL 235 (H) 2022     Lab  "Results   Component Value Date    HDL 46.1 03/13/2024    HDL 53.6 04/22/2022     Lab Results   Component Value Date    LDLCALC 115 (H) 03/13/2024     Lab Results   Component Value Date    TRIG 167 (H) 03/13/2024    TRIG 143 04/22/2022     No components found for: \"CHOLHDL\"   4/22/2023      Assessment/Plan   1. Chronic dyspnea:  Multi-factorial dyspnea due to the following causes: 1) Cardiomyopathy / chronic systolic heart failure 2) hypertension 3) Fibromyalgia / MS 4) Skeletal muscle deconditioning     - repeat August 2019 echocardiogram shows stable LV EF of 40%. She has stable NYHA class II symptoms.  - continue PT program exercises. Walking for a few minutes at a time for skeletal muscle conditioning as tolerated by MS fatigue symptomatology. Can also use aquatherapy for physical conditioning program that can reduce impact on joints during exercise. LV EF is > 35% so patient does not qualify for cardiac rehab exercise program under a heart failure indication.      2. Chronic systolic heart failure:  Stable NYHA class II symptoms. LV EF 40% on 8/2019 echocardiogram She is on optimal medical therapy for neurohormonal remodeling with metoprolol  mg a day, losartan 100 mg a day, Farxiga 10 mg a day, and spironolactone 25 mg a day for neurohormonal remodeling. Continue torsemide 10-20 mg a day.     Continue Farxiga 10 mg a day. This medication has provided improvement in severe dyspnea and renal function on recent blood work shows improved estimated GFR. She meets the criteria of enrolled patients for the DAPA-HF trial of Farxiga (dapagliflozin) 10 mg a day for prevention of worsening heart failure or cardiovascular death. I renewed her paper work for the [employer] patient assistance program in late 2023 for another 12 months through end of 2024. Initial application was approved in February 2023.      3. Hypertension:  Blood pressure at goal on current medications.    4. Dyslipidemia:  LDL improved " 0838  •  cholecalciferol (VITAMIN D3) tablet 1,000 Units, 1,000 Units, Oral, Daily, Anastacio Kim MD, 1,000 Units at 08/13/21 0838  •  dorzolamide (TRUSOPT) 2 % 1 drop, timolol (TIMOPTIC) 0.5 % 1 drop for Cosopt 22.3-6.8 mg/mL, , Both Eyes, Daily, Anastacio Kim MD, Given at 08/13/21 0838  •  furosemide (LASIX) tablet 20 mg, 20 mg, Oral, Daily, Anastacio Kim MD, 20 mg at 08/13/21 0838  •  HYDROcodone-acetaminophen (NORCO) 5-325 MG per tablet 1 tablet, 1 tablet, Oral, Q6H PRN, Augusto Johnson MD, 1 tablet at 08/13/21 0602  •  latanoprost (XALATAN) 0.005 % ophthalmic solution 1 drop, 1 drop, Left Eye, Daily, Anastacio Kim MD, 1 drop at 08/12/21 2045  •  melatonin tablet 5 mg, 5 mg, Oral, Nightly PRN, Anastacio Kim MD, 5 mg at 08/12/21 2052  •  mirtazapine (REMERON) tablet 15 mg, 15 mg, Oral, Nightly, Anastacio Kim MD, 15 mg at 08/12/21 2044  •  pantoprazole (PROTONIX) EC tablet 40 mg, 40 mg, Oral, QAM, Anastacio Kim MD, 40 mg at 08/13/21 0602  •  potassium chloride (K-DUR,KLOR-CON) ER tablet 20 mEq, 20 mEq, Oral, TID With Meals, Anastacio Kim MD, 20 mEq at 08/13/21 1243     ASSESSMENT  Acute left lower extremity DVT  Recent left ankle fracture s/p open reduction internal fixation  Hypertension  Osteoarthritis/gout  Constipation  Chronic kidney disease stage III  Gastroesophageal reflux disease    PLAN  Discharge to subacute rehab  Discharge summary dictated    ANASTACIO KIM MD                 with lifestyle changes to 115 from 153. Mild coronary arteriosclerosis present on 12/2019 CT scan. Goal LDL < 100. Recommend low dose statin (rosuvastatin 5 mg a day). She does not need to take aspirin 81 mg a day.     Follow up with Dr. Mead in 6 months.     Time = 30 minutes on direct patient care reviewing current symptoms and discussing treatment options, reviewing prior cardiac care, and coordinating follow up testing and medication renewal.        SIGNATURE: Aguila Mead MD PATIENT NAME: Jes Roper   DATE/TIME: December 19, 2024 2:19 PM MRN: [id number]

## 2024-12-19 NOTE — PATIENT INSTRUCTIONS
You were seen in the [facility] for your chronic systolic heart failure and shortness of breath (the medical term is called dyspnea).     Your shortness of breath stable compared to 6 months ago and is likely due to a combination of your heart failure (chronic systolic heart failure), and skeletal muscle deconditioning from your MS and fibromyalgia.      Your repeat August 2019 echocardiogram showed that your heart muscle squeezing strength (LV ejection fraction) was stable at 40%.      You are on good heart failure medical therapy with:  1. Metoprolol  mg a day  2. REDUCE losartan to 50 mg a day from 100 mg a day for more blood pressure room, your blood pressure was 82/50 mm Hg today  3. Farxiga 10 mg a day  4. Spironolactone 25 mg a day.     Continue to take torsemide 10-20 mg a day. You can take a 1/2 tablet (10 mg) now in the summer to see if this helps your blood pressure stay in normal range. Weight yourself every day, if you gain 2 pounds overnight, take a full 20 mg tablet that morning.    I recommend we continue rosuvastatin 5 mg a day to lower your cholesterol. Your LDL (bad) cholesterol was 115 better than 153 2 years ago but above long term goal < 100 to protect your heart arteries. You do not need to take aspirin 81 mg a day.     Follow up with Dr. Mead in 4 months.

## 2024-12-23 LAB
ATRIAL RATE: 76 BPM
P AXIS: 64 DEGREES
P OFFSET: 183 MS
P ONSET: 132 MS
PR INTERVAL: 184 MS
Q ONSET: 224 MS
QRS COUNT: 13 BEATS
QRS DURATION: 88 MS
QT INTERVAL: 382 MS
QTC CALCULATION(BAZETT): 429 MS
QTC FREDERICIA: 413 MS
R AXIS: 76 DEGREES
T AXIS: 57 DEGREES
T OFFSET: 415 MS
VENTRICULAR RATE: 76 BPM

## 2024-12-29 NOTE — PROGRESS NOTES
Subjective   Patient ID: Jes Roper is a 69 y.o. female who presents for pessary follow up..  HPI  69-year-old with a history of a #4 incontinence ring pessary refitted with a number for incontinence ring today 3/8/2024 with history of genuine stress urinary incontinence having undergone 10/19/2020 excision of urethral polyp and cystoscopy for history of urinary urgency, stress incontinence, vaginal atrophy with urethral polyp, multiple sclerosis, pelvic floor weakness, and constipation with urinary urgency and frequency.       The patient presents for pessary maintenance, she denies any vaginal complaints, no abnormal bleeding or discharge.         She feels that her urinary symptoms are worsening, she is incontinence and has urgency and frequency.     She denies any bowel related complaints, no fecal or flatal incontinence.    She has no other complaints.    From Previous note  68-year-old with a history of a #4 incontinence ring pessary refitted with a number for incontinence ring today 3/8/2024 with history of genuine stress urinary incontinence having undergone 10/19/2020 excision of urethral polyp and cystoscopy for history of urinary urgency, stress incontinence, vaginal atrophy with urethral polyp, multiple sclerosis, pelvic floor weakness, and constipation with urinary urgency and frequency.     The patient presents for pessary maintenance, she denies any vaginal complaints, no abnormal bleeding or discharge.     She unfortunately lost her  in April due to liver cancer.  She has been quite sad following this.  She denies any significant depression concerns and is following up with the hospice team.    She feels that her urinary symptoms are well-controlled.    She denies any bowel related complaints, no fecal or flatal incontinence.    She has no other complaints.    From Previous note  68-year-old with a history of a #4 incontinence ring pessary with history of genuine stress urinary  "incontinence having undergone 10/19/2020 excision of urethral polyp and cystoscopy for history of urinary urgency, stress incontinence, vaginal atrophy with urethral polyp, multiple sclerosis, pelvic floor weakness, and constipation with urinary urgency and frequency.    The patient has been noting worsening urinary urgency and frequency. Gemtesa continues to be cost prohibitive and not covered by her insurance. Third line therapy options were discussed at length.     She successfully underwent the MRI for which her pessary was removed, she presents for pessary refitting. Ho    She denies any vaginal complaints, no abnormal bleeding or discharge.     She denies any bowel related complaints, no fecal or flatal incontinence.    She has no other complaints.    From Previous note  This visit was performed through telemedicine  68-year-old with a history of a #4 incontinence ring pessary with history of genuine stress urinary incontinence having undergone 10/19/2020 excision of urethral polyp and cystoscopy for history of urinary urgency, stress incontinence, vaginal atrophy with urethral polyp, multiple sclerosis, pelvic floor weakness, and constipation with urinary urgency and frequency with a  #3 incontinence dish fitted 1/8/2024.     The patient had a sensation of pressure and \"like  was trying to push something out\" after she left the office after her  #3 incontinence dish was fitted 1/8/2024. She took tylenol and the discomfort resolved.  She is overall satisfied with her pessary at this time.    She has been utilizing Gemtesa with significant benefits, she denies any urgency complaints. The medication however is $95 for a 30-day supply.     She denies any vaginal complaints, no abnormal bleeding or discharge.     She denies any bowel related complaints, no fecal or flatal incontinence.    She has no other complaints.    From Previous note  68-year-old with #4 incontinence ring pessary with history of genuine " stress urinary incontinence having undergone 10/19/2020 excision of urethral polyp and cystoscopy for history of urinary urgency, stress incontinence, vaginal atrophy with urethral polyp, multiple sclerosis, pelvic floor weakness, and constipation with urinary urgency and frequency.     The patient did not note any changes in her LUTS with the pessary out. She notes worsening urinary urgency and frequency when she takes her diuretics. She notes daytime frequency associated with her diuretic use. She notes 1-2 episodes of nocturia. Gemtesa samples were provided to her. She denies any UTI like symptoms.     She denies any vaginal complaints, no abnormal bleeding or discharge.     She denies any bowel related complaints, no fecal or flatal incontinence.    She has no other complaints.      From Previous note  68-year-old with #4 incontinence ring pessary with history of genuine stress urinary incontinence having undergone 10/19/2020 excision of urethral polyp and cystoscopy for history of urinary urgency, stress incontinence, vaginal atrophy with urethral polyp, multiple sclerosis, pelvic floor weakness, and constipation with urinary urgency and frequency.     The patient required to have an MRI and was unable to proceed due to the pessary. She presents to get the pessary removed to be able to have the MRI.     She has a fall 10/17 and is noting worsening MS symptoms since then.     She notes worsening urinary urgency and frequency when she takes her diuretics. She notes daytime frequency associated with her diuretic use. She notes 1-2 episodes of nocturia.    She denies any vaginal complaints, no abnormal bleeding or discharge.     She denies any bowel related complaints, no fecal or flatal incontinence.    She has no other complaints.    From Previous note  68-year-old with #4 incontinence ring pessary with history of genuine stress urinary incontinence having undergone 10/19/2020 excision of urethral polyp and  "cystoscopy for history of urinary urgency, stress incontinence, vaginal atrophy with urethral polyp, multiple sclerosis, pelvic floor weakness, and constipation with urinary urgency and frequency.     She is overall very satisfied with her pessary. She denies any abnormal vaginal bleeding or discharge. She notes improvement in her stress urinary incontinence complaints.     She is no longer taking Myrbetriq. \"I have a full plate\". She is presently managing her mother who is 98 and on hospice. She also has had recent increase in her diuretic requirements. She notes daytime frequency associated with her diuretic use. She notes 1-2 episodes of nocturia.     She has no other complaints.     From previous note     67-year-old with #4 incontinence ring pessary with history of genuine stress urinary incontinence having undergone 10/19/2020 excision of urethral polyp and cystoscopy for history of urinary urgency, stress incontinence, vaginal atrophy with urethral polyp, multiple sclerosis, pelvic floor weakness, and constipation with urinary urgency and frequency.     The patient was last seen in December 2022. The patient has not started taking the Myrbetriq as it was cost prohibitive for her. She is switching insurance and will contact again after she is established. She continues to note urinary urgency and incontinence complaints. She continues to note rare stress urinary incontinence and states that she \" some days when its under control and some days she cannot move without leaking\". She denies any UTI like symptoms. She is satisfied from the pessary standpoint.      She denies any bowel related complaints, no fecal or flatal incontinence.     She denies any vaginal complaints, no abnormal vaginal bleeding or discharge. She is utilizing the vaginal estrogen cream.      She has no other complaints.   From previous note  67-year-old with #4 incontinence ring pessary with history of genuine stress urinary incontinence " "having undergone 10/19/2020 excision of urethral polyp and cystoscopy for history of urinary urgency, stress incontinence, vaginal atrophy with urethral polyp, multiple sclerosis, pelvic floor weakness, and constipation with urinary urgency and frequency presenting today for pessary maintenance.     The patient state she continues to note rare stress urinary incontinence. She is overall satisfied with her incontinence ring. She denies any vaginal complaints. She denies any UTI-like symptoms. He does however note episodic urinary urgency, frequency, and incontinence. \"It comes in waves\". She wishes to proceed with Myrbetriq in 2023.     She denies any bowel related complaints, no fecal or flatal incontinence.     She denies any vaginal complaints, no abnormal vaginal bleeding or discharge.     She has no other complaints.      From previous note   67-year-old with history of MS with mixed urinary incontinence and #4 incontinence ring pessary having undergone 10/19/2020 excision of urethral polyp and cystoscopy for history of urethral polyp presenting for pessary follow-up.     She is overall very satisfied with her pessary ring. She denies any UTI-like symptoms. She feels that she is emptying her bladder appropriately. However she has noted an episode of vaginal spotting over the last week. She otherwise denies any abnormal vaginal discharge.     At her last appointment, we discussed her concerns for several episodes of urinary incontinence. However it was unclear whether this was urine or vaginal discharge. She was instructed to proceed with a phenazopyridine challenge. She has been unable to perform this. She does not have any particular lower urinary tract complaints at this time. .      She has a \"very full plate\" as her 97-year-old mother recently moved in. She has not had the opportunity to \"really think about\" her lower urinary tract symptoms.     She has no other complaints.     From previous " note  66-year-old presenting for incontinence ring pessary follow-up after 10/19/2020 excision of urethral polyp and cystoscopy for history of urethral polyp, urinary urgency, female stress incontinence, and vaginal atrophy with history of MS.     The patient was refitted with a #4 incontinence ring at her last appointment. She is overall very satisfied with this. She feels that this has significantly improved her urinary leakage as well as her urgency complaints. She wishes to continue this therapy moving forward.     She denies any UTI-like symptoms.     She has picked up her vaginal estrogen therapy but has not begun this.     She has no other complaints.     From previous note  65-year-old with urethral polyp, urinary urgency, and vaginal atrophy.     The patient continues to utilize her vaginal estrogen therapy. She has not noted any improvement in her urethral polyp complaints. She denies any gross hematuria. She denies any significant changes in her urinary urgency.     She wishes to proceed with definitive surgical management.     She has no other complaints.     From previous note  65-year-old presenting as a referral from Dr. Carvalho with complaints of a urethral mass, gross hematuria, and urinary urgency.     The patient was originally evaluated in March with complaints of vaginal bleeding. She was noted to have urethral prolapse and vaginal atrophy and started on vaginal estrogen cream. Upon reevaluation her prolapse had improved but upon evaluation by Dr. Carvalho was found to have a urethral polyp. Cystoscopy at that time was normal and upper tract imaging demonstrated nonobstructive bilateral nephrolithiasis.     The patient continues to note episodic spotting when wiping. She does note urinary urgency and frequency but denies any urge related incontinence. She does note rare stress urinary incontinence. She notes nocturia up to 3 times. She denies enuresis. She denies a history of chronic urinary tract  infections.     She is not sexually active. She does suffer from MS. She denies any vaginal bulge complaints. She denies any abnormal vaginal discharge.     She has episodic diarrhea and constipation. She denies any fecal or flatal incontinence.     She has no other complaints.   Review of Systems  Constitutional: No fever, No chills and No fatigue.   Eyes: No vision problems and No dryness of the eyes.   ENT: No dry mouth, No hearing loss and No nosebleeds.   Cardiovascular: No chest pain, No palpitations and No orthopnea.   Respiratory: No shortness of breath, No cough and No wheezing.   Gastrointestinal: No abdominal pain, No constipation, No nausea, No diarrhea, No vomiting and No melena.   Genitourinary: As noted in HPI.   Musculoskeletal: No back pain, No myalgias, No muscle weakness, No joint swelling and No leg edema.   Integumentary: No rashes, No skin lesion and No itching.   Neurological: No headache, No numbness and No dizziness.   Psychiatric: No sleep disturbances, No anxiety and No depression.   Endocrine: No hot flashes, No loss of hair and No hirsutism.   Hematologic/Lymphatic: No swollen glands, No tendency for easy bleeding and No tendency for easy bruising.   All other systems have been reviewed and are negative for complaint.        Objective   Physical Exam    PHYSICAL EXAMINATION:  No LMP recorded.  There is no height or weight on file to calculate BMI.  There were no vitals taken for this visit.  General Appearance: well appearing  Neuro: Alert and oriented   HEENT: mucous membranes moist, neck supple  Resp: No respiratory distress, normal work of breathing  MSK: normal range of motion, gait appropriate    After the vagina was treated with lidocaine gel, resistance was met while trying to remove the patient's #3 incontinence ring.  It appears that a 2 cm area of the most proximal portion of the ring was agglutinated to the vaginal tissues.  The patient admits that she has not been utilizing  her vaginal estrogen therapy.  With gentle pressure the tissues were  and the incontinence ring removed.  Careful inspection of the vagina noted a large area of mildly excoriated tissue of the vaginal apex this was treated with silver nitrate.  The pessary was not replaced.      Assessment/Plan   69-year-old with a history of a #4 incontinence ring pessary refitted with a #3 incontinence ring 3/8/2024 with history of genuine stress urinary incontinence having undergone 10/19/2020 excision of urethral polyp and cystoscopy for history of urinary urgency, stress incontinence, vaginal atrophy with urethral polyp, multiple sclerosis, pelvic floor weakness, and constipation with urinary urgency and frequency with vaginal agglutination around her pessary noted today 12/30/2024.     The patient's #3 incontinence ring was removed today 12/30/2024 due to agglutination and vaginal irritation.  We discussed the importance of using her vaginal estrogen therapy nightly for the next 2 weeks. We have previously discussed the patient's urodynamics. We specifically discussed therapy for her stress urinary incontinence. We have previously discussed pelvic floor physical therapy, pessary management, and definitive management with a mid urethral sling or Bulkamid therapy.  She has recently noted worsening urge urinary incontinence complaints.  We again discussed urodynamics findings noting terminal contraction noted at 300 cc. We again discussed the importance of timed voiding and fluid management strategies. She had no benefits with oxybutynin therapy in the past.  Her daughter is a pharmacist and had asked about Vesicare and we discussed that this was a medication similar to the oxybutynin she had previously used with minimal benefits.  She had excellent results with her Gemtesa but this does appear to be cost prohibitive at $95 a month.  We again discussed PTNS, Botox, Revi, and InterStim including the various risks of these  procedures.  The patient will be prescribed Myrbetriq therapy now and we will determine if this is a more cost appropriate medication for her come 2025.     2. We again discussed the patient's constipation. We discussed titrating daily MiraLAX therapy. We discussed the importance of daily fiber therapy. She will continue this moving forward.     #3  We discussed the patient's pessary and urinary complaints as noted above.  She will return to clinic in 2 weeks to discuss her lower urinary tract symptoms.  If she has noted significant worsening in her stress urinary incontinence complaints, we discussed proceeding with replacement of her pessary and possible Bulkamid therapy scheduling.  She will contact the clinic regarding the coverage of her beta 3 agonist therapy and we will readdress third line therapeutic options should this not be covered in 2 weeks.     ANTWON Pope MD      Scribe Attestation    By signing my name below, IChristy Scribe attest that this documentation has been prepared under the direction and in the presence of Ajay Pope MD. All medical record entries made by the Scribe were at my direction or personally dictated by me. I have reviewed the chart and agree that the record accurately reflects my personal performance of the history, physical exam, discussion and plan.

## 2024-12-30 ENCOUNTER — APPOINTMENT (OUTPATIENT)
Dept: UROLOGY | Facility: CLINIC | Age: 69
End: 2024-12-30
Payer: COMMERCIAL

## 2024-12-30 VITALS
DIASTOLIC BLOOD PRESSURE: 75 MMHG | HEART RATE: 84 BPM | SYSTOLIC BLOOD PRESSURE: 115 MMHG | BODY MASS INDEX: 37.11 KG/M2 | WEIGHT: 190 LBS

## 2024-12-30 DIAGNOSIS — R31.21 ASYMPTOMATIC MICROSCOPIC HEMATURIA: ICD-10-CM

## 2024-12-30 DIAGNOSIS — N39.3 FEMALE STRESS INCONTINENCE: ICD-10-CM

## 2024-12-30 DIAGNOSIS — N95.2 VAGINAL ATROPHY: ICD-10-CM

## 2024-12-30 DIAGNOSIS — G35 MULTIPLE SCLEROSIS (MULTI): ICD-10-CM

## 2024-12-30 DIAGNOSIS — N39.41 URGE URINARY INCONTINENCE: ICD-10-CM

## 2024-12-30 DIAGNOSIS — Z96.0 PRESENCE OF PESSARY: ICD-10-CM

## 2024-12-30 DIAGNOSIS — Z46.89 PESSARY MAINTENANCE: Primary | ICD-10-CM

## 2024-12-30 PROCEDURE — 3074F SYST BP LT 130 MM HG: CPT | Performed by: OBSTETRICS & GYNECOLOGY

## 2024-12-30 PROCEDURE — 99213 OFFICE O/P EST LOW 20 MIN: CPT | Performed by: OBSTETRICS & GYNECOLOGY

## 2024-12-30 PROCEDURE — 3049F LDL-C 100-129 MG/DL: CPT | Performed by: OBSTETRICS & GYNECOLOGY

## 2024-12-30 PROCEDURE — 4010F ACE/ARB THERAPY RXD/TAKEN: CPT | Performed by: OBSTETRICS & GYNECOLOGY

## 2024-12-30 PROCEDURE — 1160F RVW MEDS BY RX/DR IN RCRD: CPT | Performed by: OBSTETRICS & GYNECOLOGY

## 2024-12-30 PROCEDURE — 2500000005 HC RX 250 GENERAL PHARMACY W/O HCPCS: Performed by: OBSTETRICS & GYNECOLOGY

## 2024-12-30 PROCEDURE — 1123F ACP DISCUSS/DSCN MKR DOCD: CPT | Performed by: OBSTETRICS & GYNECOLOGY

## 2024-12-30 PROCEDURE — 1036F TOBACCO NON-USER: CPT | Performed by: OBSTETRICS & GYNECOLOGY

## 2024-12-30 PROCEDURE — G2211 COMPLEX E/M VISIT ADD ON: HCPCS | Performed by: OBSTETRICS & GYNECOLOGY

## 2024-12-30 PROCEDURE — 1159F MED LIST DOCD IN RCRD: CPT | Performed by: OBSTETRICS & GYNECOLOGY

## 2024-12-30 PROCEDURE — 3060F POS MICROALBUMINURIA REV: CPT | Performed by: OBSTETRICS & GYNECOLOGY

## 2024-12-30 PROCEDURE — 3078F DIAST BP <80 MM HG: CPT | Performed by: OBSTETRICS & GYNECOLOGY

## 2024-12-30 RX ORDER — LIDOCAINE HYDROCHLORIDE 20 MG/ML
1 JELLY TOPICAL ONCE
Status: COMPLETED | OUTPATIENT
Start: 2024-12-30 | End: 2024-12-30

## 2024-12-30 RX ORDER — MIRABEGRON 50 MG/1
50 TABLET, FILM COATED, EXTENDED RELEASE ORAL DAILY
Qty: 30 TABLET | Refills: 11 | Status: SHIPPED | OUTPATIENT
Start: 2024-12-30 | End: 2025-12-30

## 2024-12-30 RX ORDER — ESTRADIOL 0.1 MG/G
CREAM VAGINAL
Qty: 42.5 G | Refills: 3 | Status: SHIPPED | OUTPATIENT
Start: 2024-12-30

## 2024-12-30 RX ADMIN — LIDOCAINE HYDROCHLORIDE 1 APPLICATION: 20 JELLY TOPICAL at 14:46

## 2024-12-30 NOTE — PATIENT INSTRUCTIONS
Please follow-up in 2 weeks for pessary refitting.     Please use  your vaginal estrogen therapy nightly for 2 weeks and then 3 times a week thereafter. Do not use the plastic applicator and only use your fingertip.     Call the clinic with questions or concerns.    [phone]

## 2024-12-31 DIAGNOSIS — M79.7 FIBROMYALGIA: ICD-10-CM

## 2024-12-31 RX ORDER — PREGABALIN 150 MG/1
150 CAPSULE ORAL 2 TIMES DAILY
Qty: 60 CAPSULE | Refills: 3 | Status: SHIPPED | OUTPATIENT
Start: 2024-12-31

## 2025-01-06 ENCOUNTER — TELEPHONE (OUTPATIENT)
Dept: CARDIOLOGY | Facility: HOSPITAL | Age: 70
End: 2025-01-06
Payer: COMMERCIAL

## 2025-01-06 DIAGNOSIS — M79.7 FIBROMYALGIA: ICD-10-CM

## 2025-01-06 RX ORDER — PREGABALIN 150 MG/1
150 CAPSULE ORAL 2 TIMES DAILY
Qty: 60 CAPSULE | Refills: 3 | Status: SHIPPED | OUTPATIENT
Start: 2025-01-06

## 2025-01-13 ENCOUNTER — TELEPHONE (OUTPATIENT)
Dept: SCHEDULING | Age: 70
End: 2025-01-13

## 2025-01-14 NOTE — PROGRESS NOTES
[facility]  Outpatient Clinic Note    Chief Complaint   Patient presents with    Pessary Check     Referring provider: No referring provider defined for this encounter.     Subjective   Jes Roper is a 69 y.o. female presenting for pessary replacement, urinary incontinence.     History of Present Illness:  Patient known to Dr. Pope for stress urinary incontinence, urinary urgency, vaginal atrophy and urethral polyp.  She is s/p excision of urethral polyp and cystoscopy 10/19/2020.  Last visit 12/30/2024 when incontinence ring #3 pessary was removed due to vaginal agglutination, vaginal apex excoriation treated with silver nitrate.  Plan was to use vaginal estrogen cream nightly for 2 weeks, start Myrbetriq 50 mg and return in 2 weeks for pessary replacement, possible Bulkamid therapy scheduling.  Of note patient has undergone UDS notable for terminal DO at 300 cc.  She has tried and failed oxybutynin.  She had good success with Gemtesa but this was cost prohibitive.    Today she reports she is doing well overall. Pessary remains out. Without pessary in place, ALVA happens daily. She has been using vaginal estrogen cream almost daily since last visit with Dr Pope. She does note she has been applying externally. Denies vaginal bleeding. Denies vaginal pain. She is unsure if she'd like the pessary replaced today, considering other options including bulking vs MUS.     Endorses ALVA and UUI. Can experience urge to void but is unable to stop stream when it starts, has very little warning.  She is unable to say if stress or urge incontinence bothers her more or which she would like to focus on treatment wise first.  She is not taking Myrbetriq as it was also cost prohibitive. Urinary symptoms remain stable at baseline. She is not concerned about UTI, no acutely worsened symptoms. Denies dysuria. Denies hematuria. Denies fevers, chills, nausea or vomiting.       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    Abnormal serum cholesterol    Disorder of pigmentation, unspecified 03/22/2022    Discoloration of skin of toe    Dizziness and giddiness 07/01/2022    Dizziness    Dorsalgia, unspecified 12/30/2022    Back pain    Edema, unspecified 07/17/2019    Edema    Encounter for fitting and adjustment of other specified devices 01/19/2021    Fitting and adjustment of pessary    Encounter for general adult medical examination without abnormal findings 03/22/2022    Encounter for Medicare annual wellness exam    Encounter for gynecological examination (general) (routine) without abnormal findings 12/05/2018    Visit for gynecologic examination    Encounter for gynecological examination (general) (routine) without abnormal findings 03/17/2016    Encounter for gynecological examination without abnormal finding    Encounter for other screening for malignant neoplasm of breast 11/01/2019    Encounter for screening for malignant neoplasm of breast    Encounter for other screening for malignant neoplasm of breast 03/22/2022    Breast screening    Fibromyalgia 09/29/2022    Fibromyalgia    Hyperlipidemia     Hypertension     Hypothyroidism, unspecified 03/22/2022    Hypothyroidism    Local infection of the skin and subcutaneous tissue, unspecified 02/22/2019    Toe infection    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[facility])     Multiple sclerosis (Multi) 12/06/2022    Multiple sclerosis    Obesity, unspecified 11/21/2016    Mild obesity    Other  age-related incipient cataract, bilateral 2022    Other age-related incipient cataract of both eyes    Other chronic pain 2022    Chronic pain    Other specified disorders of urethra 2020    Prolapse of urethra    Pain in right knee 2022    Right knee pain    Peripheral vascular disease, unspecified (CMS-[facility]) 2022    Arterial insufficiency of lower extremity    Personal history of other diseases of urinary system 2020    History of prolapse of bladder    Personal history of other endocrine, nutritional and metabolic disease 2014    History of hypothyroidism    Personal history of other specified conditions 2022    History of chronic cough    Sciatica, right side 2019    Right sciatic nerve pain    Sleep apnea, unspecified 04/10/2017    Sleep apnea    Torticollis 2022    Torticollis    Tremor, unspecified 2022    Tremor of both hands    Unspecified cataract     Cataracts, both eyes    Unspecified skin changes 2022    Skin change    Unspecified symptoms and signs involving the genitourinary system 2021    Urinary symptom or sign       Past Surgical History:   Procedure Laterality Date     SECTION, CLASSIC  10/03/2018     Section    GALLBLADDER SURGERY  2020    Gallbladder Surgery    OTHER SURGICAL HISTORY  2021    Shoulder replacement    OTHER SURGICAL HISTORY  2020    Exploratory laparotomy    OTHER SURGICAL HISTORY  2020    Urethroplasty       Medications  Current Outpatient Medications on File Prior to Visit   Medication Sig Dispense Refill    baclofen (Lioresal) 10 mg tablet Take 0.5 tablets (5 mg) by mouth once daily at bedtime.      buPROPion XL (Wellbutrin XL) 300 mg 24 hr tablet Take 1 tablet (300 mg) by mouth once daily.      cholecalciferol (Vitamin D-3) 50 MCG (2000 UT) tablet Take 1 tablet (2,000 Units) by mouth once daily.      cyanocobalamin (Vitamin B-12) 100 mcg tablet Take 1 tablet  (100 mcg) by mouth once daily.      dapagliflozin propanediol (Farxiga) 10 mg Take 1 tablet (10 mg) by mouth once daily. 90 tablet 3    DULoxetine (Cymbalta) 60 mg DR capsule Take 1 capsule (60 mg) by mouth once daily.      estradiol (Estrace) 0.01 % (0.1 mg/gram) vaginal cream Place a dime sized amount vaginally  3 times a week 42.5 g 3    fluticasone (Flonase) 50 mcg/actuation nasal spray Administer 2 sprays into each nostril once daily.      hydroxychloroquine (Plaquenil) 200 mg tablet Take 1 tablet (200 mg) by mouth once daily. 90 tablet 3    hyoscyamine ER (Levbid) 0.375 mg 12 hr tablet Take 1 tablet (0.375 mg) by mouth once daily.      levothyroxine (Synthroid, Levoxyl) 50 mcg tablet Take 1 tablet (50 mcg) by mouth once daily. as directed 90 tablet 1    losartan (Cozaar) 50 mg tablet Take 1 tablet (50 mg) by mouth once daily. 90 tablet 3    metoprolol succinate XL (Toprol-XL) 200 mg 24 hr tablet Take 1 tablet (200 mg) by mouth once daily. 90 tablet 3    mirabegron (Myrbetriq) 50 mg tablet extended release 24 hr 24 hr tablet Take 1 tablet (50 mg) by mouth once daily. 30 tablet 11    multivit-min-iron-FA-vit K-lut (Centrum Silver Women) 8 mg iron-400 mcg-50 mcg tablet Take 1 tablet by mouth once daily.      ofatumumab (Kesimpta Pen) 20 mg/0.4 mL injection       omeprazole (PriLOSEC) 40 mg DR capsule Take 1 capsule (40 mg) by mouth once daily in the morning. Take before meals. 90 capsule 1    potassium chloride CR (Klor-Con M20) 20 mEq ER tablet Take 1 tablet (20 mEq) by mouth once daily. Do not crush or chew. 90 tablet 1    pregabalin (Lyrica) 150 mg capsule Take 1 capsule (150 mg) by mouth 2 times a day. 60 capsule 3    rosuvastatin (Crestor) 5 mg tablet Take 1 tablet (5 mg) by mouth once daily. 30 tablet 11    spironolactone (Aldactone) 25 mg tablet Take 1 tablet (25 mg) by mouth once daily. 90 tablet 3    tiZANidine (Zanaflex) 2 mg tablet Take 2 tablets (4 mg) by mouth every 8 hours if needed for muscle  spasms. 180 tablet 0    torsemide (Demadex) 20 mg tablet Take 1 tablet (20 mg) by mouth once daily. 90 tablet 3    modafinil (Provigil) 200 mg tablet Take 1 tablet (200 mg) by mouth twice a day. 60 tablet 2     No current facility-administered medications on file prior to visit.       Allergies:  Allergies   Allergen Reactions    Naltrexone Unknown    Penicillin Unknown       Objective     Physicial Exam  Vitals:    01/15/25 1313   BP: 111/64   Pulse: 80     There is no height or weight on file to calculate BMI.    General: Well developed, well nourished, alert and cooperative, appears in no acute distress  Eyes: Non-injected conjunctiva, sclera clear, no proptosis  Cardiac: No edema, cyanosis or pallor.   Lungs: Breathing is easy, non-labored. Speaking in clear and complete sentences. Normal diaphragmatic movement.  MSK: Ambulatory with steady gait, unassisted  Neuro: alert and oriented to person, place and time  Psych: Demonstrates good judgement and reason, without hallucinations, abnormal affect or abnormal behaviors.  Skin: no obvious lesions, no rashes.    Pelvic:  No vulvar lesion  Urethra: normal appearance, non-tender, no masses or lesions.   Vagina: Epithelium atrophic, no bleeding or discharge. No evidence of agglutination of vaginal tissues, able to easily place speculum. No pessary in place. Area of excoriated tissue appears improved, no bleeding or discharge. Tissue very atrophic.      Lab Results   Component Value Date    URINECULTURE No significant growth 11/30/2023         Chemistry    Lab Results   Component Value Date/Time     12/18/2024 1545    K 4.4 12/18/2024 1545     12/18/2024 1545    CO2 31 12/18/2024 1545    BUN 22 12/18/2024 1545    CREATININE 1.09 (H) 12/18/2024 1545    Lab Results   Component Value Date/Time    CALCIUM 9.4 12/18/2024 1545    ALKPHOS 149 (H) 12/18/2024 1545    AST 19 12/18/2024 1545    ALT 21 12/18/2024 1545    BILITOT 0.4 12/18/2024 1545            Review of  Systems  All other systems have been reviewed and are negative for complaint.    Assessment and Plan:  Jes Roper is a 69 y.o. female known to Dr Pope for history of ALVA, MS & neurogenic bladder or urgency, UUI, vaginal atrophy. She is s/p excision of urethral polyp and cystoscopy 10/19/2020.    > Stress urinary incontinence: Incontinence ring #3 removed previously due to vaginal irritation and agglutination. Exam improved today, notable for significant atrophy without bleeding or discharge.  We discussed management options for stress urinary incontinence including replacement of pessary, transurethral bulking or mid urethral sling.  Patient is not interested in having pessary replaced today.  She would like to consider procedural options.  She was previously provided with patient education material by Dr. Pope.  Discussed I would have her follow-up with MD partner for further discussion, treatment planning.     > Neurogenic bladder, urinary urgency, urge incontinence: She has tried and failed oxybutynin.  Gemtesa and Myrbetriq both cost prohibitive.  We discussed third line therapies including Botox, InterStim.  She would like to consider options.  We did discuss the Botox procedure in detail including risk of retention, UTI.  She is not currently taking OAB med.  Patient is asymptomatic in terms of UTI, no acute change in symptoms.    > Vaginal atrophy: Continue vaginal estrogen cream 2-3 times per week.  Discussed intravaginal application via fingertip.    All questions and concerns were addressed. Patient verbalizes understanding and has no other questions at this time.     Follow up: With Dr. Katherine zuniga for procedural options for both ALVA and UUI.    Pascale Shay PA-C  01/15/25 2:30 PM  Clinic phone: [phone], [phone]

## 2025-01-15 ENCOUNTER — APPOINTMENT (OUTPATIENT)
Dept: UROLOGY | Facility: CLINIC | Age: 70
End: 2025-01-15
Payer: COMMERCIAL

## 2025-01-15 VITALS — HEART RATE: 80 BPM | SYSTOLIC BLOOD PRESSURE: 111 MMHG | DIASTOLIC BLOOD PRESSURE: 64 MMHG

## 2025-01-15 DIAGNOSIS — Z46.89 PESSARY MAINTENANCE: Primary | ICD-10-CM

## 2025-01-15 DIAGNOSIS — N39.3 FEMALE STRESS INCONTINENCE: ICD-10-CM

## 2025-01-15 DIAGNOSIS — N31.9 NEUROGENIC BLADDER: ICD-10-CM

## 2025-01-15 PROCEDURE — 3074F SYST BP LT 130 MM HG: CPT | Performed by: PHYSICIAN ASSISTANT

## 2025-01-15 PROCEDURE — 1036F TOBACCO NON-USER: CPT | Performed by: PHYSICIAN ASSISTANT

## 2025-01-15 PROCEDURE — 4010F ACE/ARB THERAPY RXD/TAKEN: CPT | Performed by: PHYSICIAN ASSISTANT

## 2025-01-15 PROCEDURE — 3078F DIAST BP <80 MM HG: CPT | Performed by: PHYSICIAN ASSISTANT

## 2025-01-15 PROCEDURE — 1160F RVW MEDS BY RX/DR IN RCRD: CPT | Performed by: PHYSICIAN ASSISTANT

## 2025-01-15 PROCEDURE — 1123F ACP DISCUSS/DSCN MKR DOCD: CPT | Performed by: PHYSICIAN ASSISTANT

## 2025-01-15 PROCEDURE — 1159F MED LIST DOCD IN RCRD: CPT | Performed by: PHYSICIAN ASSISTANT

## 2025-01-15 PROCEDURE — 99204 OFFICE O/P NEW MOD 45 MIN: CPT | Performed by: PHYSICIAN ASSISTANT

## 2025-01-15 NOTE — PATIENT INSTRUCTIONS
"Treatment options:  Stress urinary incontinence-cough, laugh, sneeze leakage  Mid urethral sling (mesh)  Bulking   The pessary was for this type of leakage  Neurogenic bladder, urinary urgency, urge incontinence  Sacral neuromodulation -implantable device in the back, buttocks area that acts almost as a \"pacemaker\" for the bladder  Bladder Botox -lasts 6 to 12 months on average.  Risks of retention requiring catheter for short period of time, urinary tract infection  The medications you previously took or were too expensive for for this type of leakage    Goal would be to decide which type of leakage you would like to focus on treatment first.  For example, start with Botox and see how this improves symptoms overall.  Can always repeat the Botox in the future or proceed with sacral neuromodulation.  Can also add on down the road the bulking or sling.    Continue vaginal estrogen cream 2-3 times per week.  Apply a pea-sized amount via fingertip into the vagina, inserting at least until your first knuckle is inside the vagina.  This helps to prevent urinary tract infections and will keep the vaginal tissue healthy.    "

## 2025-02-13 DIAGNOSIS — R20.2 PARESTHESIA OF BOTH LOWER EXTREMITIES: ICD-10-CM

## 2025-02-13 DIAGNOSIS — R82.90 ABNORMAL URINE FINDINGS: Primary | ICD-10-CM

## 2025-02-13 DIAGNOSIS — R39.9 URINARY SYMPTOM OR SIGN: ICD-10-CM

## 2025-02-13 DIAGNOSIS — F43.20 ADJUSTMENT DISORDER, UNSPECIFIED: ICD-10-CM

## 2025-02-13 DIAGNOSIS — G89.29 OTHER CHRONIC PAIN: ICD-10-CM

## 2025-02-13 RX ORDER — TIZANIDINE 2 MG/1
4 TABLET ORAL EVERY 8 HOURS PRN
Qty: 180 TABLET | Refills: 0 | Status: SHIPPED | OUTPATIENT
Start: 2025-02-13 | End: 2026-02-13

## 2025-02-13 NOTE — TELEPHONE ENCOUNTER
Phone call from the daughter Adilia- both have had COVID for over 1 week- Adilia is doing better Jes is still having issues possibly due to MS.  They have been using OTC medication   Jes has also been experiencing urinary incontinency.  Going on for over a month but worse now MS physician and was told to speak to you possibly  a UTI.  The uro-gynecologist she was seeing is on sabbatical for a year and the PA left so they have no one to advise them regarding this issue  Should she go to the ED or urgent care for a urine and culture?  Virtual visit with you?

## 2025-02-14 ENCOUNTER — APPOINTMENT (OUTPATIENT)
Dept: PRIMARY CARE | Facility: CLINIC | Age: 70
End: 2025-02-14
Payer: COMMERCIAL

## 2025-02-14 ENCOUNTER — TELEMEDICINE (OUTPATIENT)
Dept: PRIMARY CARE | Facility: CLINIC | Age: 70
End: 2025-02-14
Payer: COMMERCIAL

## 2025-02-14 DIAGNOSIS — N39.0 URINARY TRACT INFECTION WITH HEMATURIA, SITE UNSPECIFIED: Primary | ICD-10-CM

## 2025-02-14 DIAGNOSIS — R31.9 URINARY TRACT INFECTION WITH HEMATURIA, SITE UNSPECIFIED: Primary | ICD-10-CM

## 2025-02-14 PROCEDURE — 1123F ACP DISCUSS/DSCN MKR DOCD: CPT | Performed by: INTERNAL MEDICINE

## 2025-02-14 PROCEDURE — 4010F ACE/ARB THERAPY RXD/TAKEN: CPT | Performed by: INTERNAL MEDICINE

## 2025-02-14 PROCEDURE — 99213 OFFICE O/P EST LOW 20 MIN: CPT | Performed by: INTERNAL MEDICINE

## 2025-02-14 RX ORDER — NITROFURANTOIN 25; 75 MG/1; MG/1
100 CAPSULE ORAL 2 TIMES DAILY
Qty: 14 CAPSULE | Refills: 0 | Status: SHIPPED | OUTPATIENT
Start: 2025-02-14 | End: 2025-02-21

## 2025-02-14 NOTE — PROGRESS NOTES
Subjective   Patient ID: Jes Roper is a 70 y.o. female who presents for No chief complaint on file..    HPI    I performed this visit using real-time telehealth tools, including an audio/video connection between Jes Roper and myself Dr Mckinney in office [facility], [place], [place]  Patient on with me on a virtual visit  Urinary sxs per dtr they had gone to get ua done   Cran caplets daily   Review of Systems  ROS  :    General: Denies fever, chills, night sweats,  Gastrointestinal: Negative for nausea/vomiting, abdominal pain, changes in bowel habits  Genitourinary:see hpi  negative for URINARY INCONTINENCE prior to this UTI   Neurological: Negative for headaches, dizziness    Previous history  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    Abnormal serum cholesterol    Disorder of pigmentation, unspecified 03/22/2022    Discoloration of skin of toe    Dizziness and giddiness 07/01/2022    Dizziness    Dorsalgia, unspecified 12/30/2022    Back pain    Edema, unspecified 07/17/2019    Edema    Encounter for fitting and adjustment of other specified devices 01/19/2021    Fitting and adjustment of pessary    Encounter for general adult medical examination without abnormal findings 03/22/2022    Encounter for Medicare annual wellness exam    Encounter for gynecological examination (general) (routine) without abnormal findings 12/05/2018    Visit for gynecologic examination    Encounter for gynecological examination (general) (routine) without abnormal  findings 2016    Encounter for gynecological examination without abnormal finding    Encounter for other screening for malignant neoplasm of breast 2019    Encounter for screening for malignant neoplasm of breast    Encounter for other screening for malignant neoplasm of breast 2022    Breast screening    Fibromyalgia 2022    Fibromyalgia    Hyperlipidemia     Hypertension     Hypothyroidism, unspecified 2022    Hypothyroidism    Local infection of the skin and subcutaneous tissue, unspecified 2019    Toe infection    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[facility]-HCC)     Multiple sclerosis (Multi) 2022    Multiple sclerosis    Obesity, unspecified 2016    Mild obesity    Other age-related incipient cataract, bilateral 2022    Other age-related incipient cataract of both eyes    Other chronic pain 2022    Chronic pain    Other specified disorders of urethra 2020    Prolapse of urethra    Pain in right knee 2022    Right knee pain    Peripheral vascular disease, unspecified (CMS-HCC) 2022    Arterial insufficiency of lower extremity    Personal history of other diseases of urinary system 2020    History of prolapse of bladder    Personal history of other endocrine, nutritional and metabolic disease 2014    History of hypothyroidism    Personal history of other specified conditions 2022    History of chronic cough    Sciatica, right side 2019    Right sciatic nerve pain    Sleep apnea, unspecified 04/10/2017    Sleep apnea    Torticollis 2022    Torticollis    Tremor, unspecified 2022    Tremor of both hands    Unspecified cataract     Cataracts, both eyes    Unspecified skin changes 2022    Skin change    Unspecified symptoms and signs involving the genitourinary system 2021    Urinary symptom or sign     Past Surgical History:   Procedure Laterality Date     SECTION, CLASSIC  10/03/2018      Section    GALLBLADDER SURGERY  2020    Gallbladder Surgery    OTHER SURGICAL HISTORY  2021    Shoulder replacement    OTHER SURGICAL HISTORY  2020    Exploratory laparotomy    OTHER SURGICAL HISTORY  2020    Urethroplasty     Social History     Tobacco Use    Smoking status: Never    Smokeless tobacco: Never   Vaping Use    Vaping status: Never Used   Substance Use Topics    Alcohol use: Never    Drug use: Never     Family History   Problem Relation Name Age of Onset    Diabetes Mother      Hypertension Mother      Skin cancer Mother      Other (HEPATIC CIRRHOSIS [Other]) Father      No Known Problems Sister      Other (AMD) Brother      Retinal detachment Daughter Adilia     Retinal detachment Son Aureliano     Cervical cancer Mother's Sister      Cervical cancer Maternal Grandmother       Allergies   Allergen Reactions    Naltrexone Unknown    Penicillin Unknown     Current Outpatient Medications   Medication Instructions    baclofen (Lioresal) 10 mg tablet Take 0.5 tablets (5 mg) by mouth once daily at bedtime.    buPROPion XL (WELLBUTRIN XL) 300 mg, Daily RT    cholecalciferol (VITAMIN D-3) 2,000 Units, Daily RT    cyanocobalamin (VITAMIN B-12) 100 mcg, Daily    dapagliflozin propanediol (FARXIGA) 10 mg, oral, Daily    DULoxetine (CYMBALTA) 60 mg, Daily RT    estradiol (Estrace) 0.01 % (0.1 mg/gram) vaginal cream Place a dime sized amount vaginally  3 times a week    fluticasone (Flonase) 50 mcg/actuation nasal spray 2 sprays, Daily RT    hydroxychloroquine (PLAQUENIL) 200 mg, oral, Daily RT    hyoscyamine ER (Levbid) 0.375 mg 12 hr tablet 1 tablet, Daily    levothyroxine (SYNTHROID, LEVOXYL) 50 mcg, oral, Daily, as directed    losartan (COZAAR) 50 mg, oral, Daily RT    metoprolol succinate XL (TOPROL-XL) 200 mg, oral, Daily    mirabegron (MYRBETRIQ) 50 mg, oral, Daily    modafinil (PROVIGIL) 200 mg, 2 times daily    multivit-min-iron-FA-vit K-lut (Centrum Silver Women) 8 mg iron-400  mcg-50 mcg tablet 1 tablet, Daily    ofatumumab (Kesimpta Pen) 20 mg/0.4 mL injection     omeprazole (PRILOSEC) 40 mg, oral, Daily before breakfast    potassium chloride CR (Klor-Con M20) 20 mEq ER tablet 20 mEq, oral, Daily, Do not crush or chew.    pregabalin (LYRICA) 150 mg, oral, 2 times daily    rosuvastatin (CRESTOR) 5 mg, oral, Daily    spironolactone (ALDACTONE) 25 mg, oral, Daily    tiZANidine (ZANAFLEX) 4 mg, oral, Every 8 hours PRN    torsemide (DEMADEX) 20 mg, oral, Daily       Objective       Physical Exam  via [employer] Telehealth  General: Well groomed, well nourished , speaks full sentences  Alert Cooperative , no apparent distress   Skin: Good color does not appear dehydrated   Eyes: Extra ocular muscle movements intact, anicteric sclerae  Neck: Supple, with good range of motion looking behind her and moving head sideways to cough   Neurological: Alert, oriented    Assessment/Plan   Jes Roper is a 70 y.o. female who presents for the concerns below:    Problem List Items Addressed This Visit    None    UTI PLAN: Hydration, bladder hygiene discussed with patient, urinalysis, urine culture if with positive findings. Antibiotic treatment if appropriate, nitrofurantoin, ciprofloxacin or bactrim, subsequent choice will be adjusted pending culture results if needed   Live culture yogurt or probiotics to help regrow good bacteria . May try uristat/pyridium (warned of orange discoloration of urine) for pain/spasm, cranberry juice or capsules if not allergic. If recurrent, may need to consult urology/urogynecology to check etiology.  If any problems arise patient to call or come back in. More than 50% of office visit time spent counseling the patient  If recurrent will consult urogynecology since pt has hx of MS    Time Spent  with patient:  7  minutes of which greater than 50 percent was spent counselling and coordinating care.      Discussed with:   Return in :    Portions of this note were generated  using digital voice recognition software, and may contain grammatical errors       Ambrosio Gallegos MD  02/14/25  2:20 PM

## 2025-02-16 LAB
APPEARANCE UR: CLEAR
BACTERIA #/AREA URNS HPF: ABNORMAL /HPF
BACTERIA UR CULT: NORMAL
BILIRUB UR QL STRIP: NEGATIVE
COLOR UR: YELLOW
GLUCOSE UR QL STRIP: ABNORMAL
HGB UR QL STRIP: NEGATIVE
HYALINE CASTS #/AREA URNS LPF: ABNORMAL /LPF
KETONES UR QL STRIP: NEGATIVE
LEUKOCYTE ESTERASE UR QL STRIP: NEGATIVE
NITRITE UR QL STRIP: NEGATIVE
PH UR STRIP: 6 [PH] (ref 5–8)
PROT UR QL STRIP: NEGATIVE
RBC #/AREA URNS HPF: ABNORMAL /HPF
SERVICE CMNT-IMP: ABNORMAL
SP GR UR STRIP: 1.02 (ref 1–1.03)
SQUAMOUS #/AREA URNS HPF: ABNORMAL /HPF
WBC #/AREA URNS HPF: ABNORMAL /HPF

## 2025-02-18 DIAGNOSIS — K21.9 GASTROESOPHAGEAL REFLUX DISEASE, UNSPECIFIED WHETHER ESOPHAGITIS PRESENT: ICD-10-CM

## 2025-02-18 RX ORDER — OMEPRAZOLE 40 MG/1
40 CAPSULE, DELAYED RELEASE ORAL
Qty: 90 CAPSULE | Refills: 1 | Status: SHIPPED | OUTPATIENT
Start: 2025-02-18

## 2025-02-21 ENCOUNTER — TELEPHONE (OUTPATIENT)
Dept: PRIMARY CARE | Facility: CLINIC | Age: 70
End: 2025-02-21
Payer: COMMERCIAL

## 2025-02-21 DIAGNOSIS — E11.9 TYPE 2 DIABETES MELLITUS WITHOUT COMPLICATION, UNSPECIFIED WHETHER LONG TERM INSULIN USE (MULTI): Primary | ICD-10-CM

## 2025-02-21 NOTE — TELEPHONE ENCOUNTER
Cecy called back and confirmed pt is not on any steroids and there are no other concerns. She appreciates me calling her back and relaying info. Number if we need anything is #[phone].

## 2025-02-21 NOTE — TELEPHONE ENCOUNTER
Called Cecy back and left her a vm with what you had said. I asked her to call back to confirm if pt is taking steroids or if they had any other concerns. I also called pt and spoke with her. She cannot remember her last A1C but she thought it was good. She is going to the doctors today. She asked when you would like her to get the blood work rechecked. I let her know the cough syrup might raise it and to stick with pill form. Do you want her to get blood work rechecked next week or in a few weeks?

## 2025-02-21 NOTE — TELEPHONE ENCOUNTER
Cecy from Dr. Villegas office called and said they did a UA for pt and were concerned with some of the findings. They wanted you to call them to discuss at #[phone].

## 2025-02-22 DIAGNOSIS — J42 CHRONIC BRONCHITIS, UNSPECIFIED CHRONIC BRONCHITIS TYPE (MULTI): Primary | ICD-10-CM

## 2025-02-24 RX ORDER — BENZONATATE 100 MG/1
CAPSULE ORAL
Qty: 21 CAPSULE | Refills: 0 | Status: SHIPPED | OUTPATIENT
Start: 2025-02-24

## 2025-03-07 LAB
ANION GAP SERPL CALCULATED.4IONS-SCNC: 9 MMOL/L (CALC) (ref 7–17)
APPEARANCE UR: CLEAR
BILIRUB UR QL STRIP: NEGATIVE
BUN SERPL-MCNC: 15 MG/DL (ref 7–25)
BUN/CREAT SERPL: NORMAL (CALC) (ref 6–22)
CALCIUM SERPL-MCNC: 9 MG/DL (ref 8.6–10.4)
CHLORIDE SERPL-SCNC: 105 MMOL/L (ref 98–110)
CO2 SERPL-SCNC: 26 MMOL/L (ref 20–32)
COLOR UR: ABNORMAL
CREAT SERPL-MCNC: 0.89 MG/DL (ref 0.5–1.05)
EGFRCR SERPLBLD CKD-EPI 2021: 71 ML/MIN/1.73M2
EST. AVERAGE GLUCOSE BLD GHB EST-MCNC: 120 MG/DL
EST. AVERAGE GLUCOSE BLD GHB EST-SCNC: 6.6 MMOL/L
GLUCOSE SERPL-MCNC: 84 MG/DL (ref 65–99)
GLUCOSE UR QL STRIP: ABNORMAL
HBA1C MFR BLD: 5.8 % OF TOTAL HGB
HGB UR QL STRIP: NEGATIVE
KETONES UR QL STRIP: NEGATIVE
LEUKOCYTE ESTERASE UR QL STRIP: NEGATIVE
NITRITE UR QL STRIP: NEGATIVE
PH UR STRIP: 6.5 [PH] (ref 5–8)
POTASSIUM SERPL-SCNC: 4.6 MMOL/L (ref 3.5–5.3)
PROT UR QL STRIP: NEGATIVE
SODIUM SERPL-SCNC: 140 MMOL/L (ref 135–146)
SP GR UR STRIP: 1.03 (ref 1–1.03)

## 2025-03-13 ENCOUNTER — OFFICE VISIT (OUTPATIENT)
Dept: RHEUMATOLOGY | Facility: CLINIC | Age: 70
End: 2025-03-13
Payer: COMMERCIAL

## 2025-03-13 VITALS
TEMPERATURE: 97.7 F | DIASTOLIC BLOOD PRESSURE: 72 MMHG | OXYGEN SATURATION: 98 % | WEIGHT: 188.4 LBS | HEART RATE: 87 BPM | SYSTOLIC BLOOD PRESSURE: 111 MMHG | BODY MASS INDEX: 36.79 KG/M2

## 2025-03-13 DIAGNOSIS — M43.16 SPONDYLOLISTHESIS OF LUMBAR REGION: ICD-10-CM

## 2025-03-13 DIAGNOSIS — M79.7 FIBROMYALGIA: ICD-10-CM

## 2025-03-13 DIAGNOSIS — M17.0 PRIMARY OSTEOARTHRITIS OF BOTH KNEES: Primary | ICD-10-CM

## 2025-03-13 DIAGNOSIS — M47.812 CERVICAL SPONDYLOSIS WITHOUT MYELOPATHY: ICD-10-CM

## 2025-03-13 DIAGNOSIS — Z79.899 ENCOUNTER FOR LONG-TERM (CURRENT) USE OF MEDICATIONS: ICD-10-CM

## 2025-03-13 DIAGNOSIS — M15.9 GENERALIZED OSTEOARTHRITIS OF MULTIPLE SITES: ICD-10-CM

## 2025-03-13 PROCEDURE — 3074F SYST BP LT 130 MM HG: CPT | Performed by: INTERNAL MEDICINE

## 2025-03-13 PROCEDURE — 99214 OFFICE O/P EST MOD 30 MIN: CPT | Performed by: INTERNAL MEDICINE

## 2025-03-13 PROCEDURE — 1123F ACP DISCUSS/DSCN MKR DOCD: CPT | Performed by: INTERNAL MEDICINE

## 2025-03-13 PROCEDURE — 3078F DIAST BP <80 MM HG: CPT | Performed by: INTERNAL MEDICINE

## 2025-03-13 PROCEDURE — 1159F MED LIST DOCD IN RCRD: CPT | Performed by: INTERNAL MEDICINE

## 2025-03-13 PROCEDURE — 20610 DRAIN/INJ JOINT/BURSA W/O US: CPT | Mod: 50 | Performed by: INTERNAL MEDICINE

## 2025-03-13 PROCEDURE — 1125F AMNT PAIN NOTED PAIN PRSNT: CPT | Performed by: INTERNAL MEDICINE

## 2025-03-13 PROCEDURE — 1036F TOBACCO NON-USER: CPT | Performed by: INTERNAL MEDICINE

## 2025-03-13 PROCEDURE — 2500000004 HC RX 250 GENERAL PHARMACY W/ HCPCS (ALT 636 FOR OP/ED): Performed by: INTERNAL MEDICINE

## 2025-03-13 PROCEDURE — 4010F ACE/ARB THERAPY RXD/TAKEN: CPT | Performed by: INTERNAL MEDICINE

## 2025-03-13 RX ORDER — TRIAMCINOLONE ACETONIDE 40 MG/ML
40 INJECTION, SUSPENSION INTRA-ARTICULAR; INTRAMUSCULAR
Status: COMPLETED | OUTPATIENT
Start: 2025-03-13 | End: 2025-03-13

## 2025-03-13 RX ADMIN — TRIAMCINOLONE ACETONIDE 40 MG: 40 INJECTION, SUSPENSION INTRA-ARTICULAR; INTRAMUSCULAR at 12:42

## 2025-03-13 ASSESSMENT — PAIN SCALES - GENERAL: PAINLEVEL_OUTOF10: 7

## 2025-03-13 ASSESSMENT — ENCOUNTER SYMPTOMS
FATIGUE: 1
LOSS OF SENSATION IN FEET: 0
DEPRESSION: 0
SHORTNESS OF BREATH: 0
OCCASIONAL FEELINGS OF UNSTEADINESS: 0
ABDOMINAL PAIN: 0

## 2025-03-13 NOTE — PROGRESS NOTES
Subjective   Patient ID: Jes Roper is a 70 y.o. female who presents for No chief complaint on file..  HPI  Patient with history of fibromyalgia and multiple sclerosis with features of optic neuritis.  Also has a history of cardiomyopathy with chronic systolic heart failure, hypertension has been on various medications such as Cymbalta, Effexor, Lamictal, Lodine, naltrexone, gabapentin, Paxil, Pristiq, Prozac, Relafen, Savella, Skelaxin, Ultram, Wellbutrin, Zanaflex, Vioxx.  Patient is accompanied by her daughter today  Patient was last seen in December and at that time She did not feel there was a huge difference of increasing her Lyrica from 150 to 200 mg.  We went back down to 150 mg.  Continues to be on hydroxychloroquine.  We discussed about increase in physical activity.  She unfortunately was having a lot of grief over the death of her .    Had discussed with her PCP last month about UTI .  Has a history of pessary.  Unfortunately her urologist is on sabbatical and the covering provider also has left the system and she is waiting for another provider and may.    In the interim she did not do any increase in activity.  We had discussed about Silver sneakers.  She mostly sits or lays in bed.  Has a lot of aches in her knees.  Has pain in her back.  The shoulder that was replaced on the right still gives her pain with range of motion.  She is still overwhelmed with the things that she needs to do after the death of her .  Is trying to get her taxes together.  She is going through things in her house.  She is seeing a mental health specialist  Review of Systems   Constitutional:  Positive for fatigue.   Respiratory:  Negative for shortness of breath.    Cardiovascular:  Negative for chest pain.   Gastrointestinal:  Negative for abdominal pain.   Musculoskeletal:         Per HPI   Neurological:         History of MS       Objective   Physical Exam  GEN: NAD A&O x3 appropriate affect,  rollator  EYES: no conjunctival redness, eyelids normal  SKIN: No rashes seen on exposed skin  TENDER POINTS: 14/18   MSK:  tender b/l knees  Right shoulder replacement no evidence of synovitis in the joints of her hands wrist elbows  Assessment/Plan     Fibromyalgia and osteoarthritis. She has a mild elevation of her CRP of uncertain clinical significance. Since being on the Plaquenil she has had some improvement of her symptoms.   -Currently taking 150 mg of Lyrica twice a day and did not make a huge difference from her taking 200 mg daily.  She has been taking all of her medications that her daughter has been putting into her pillbox.    -Also currently on hydroxychloroquine  -Will put in for water therapy to help with her knees and lower back.  Eventually will transition to land therapy.    Bilateral knee pain injected medially with 40 mg of Kenalog    Patient updated her urine drug screen and controlled substance contract for her pregabalin.  Checked [place].  She is also on modafinil and diazepam.    Will have her come back in 3 to 4 months or as needed     [place]:  No data recorded  I have personally reviewed the OARRS report for Jes YEUNG Judson. I have considered the risks of abuse, dependence, addiction and diversion    Is the patient prescribed a combination of a benzodiazepine and opioid?    Last Urine Drug Screen / ordered today:   No results found for this or any previous visit (from the past 8760 hours).    Results are as expected.         Controlled Substance Agreement:  Date of the Last Agreement: 03/13/24  Reviewed Controlled Substance Agreement including but not limited to the benefits, risks, and alternatives to treatment with a Controlled Substance medication(s).    Lyrica:  What is the patient's goal of therapy?  Help with fibromyalgia pain  Is this being achieved with current treatment?  Yes    Pain Assessment:  No data recorded    Activities of Daily Living:  Is your overall impression that  this patient is benefiting (symptom reduction outweighs side effects) from Lyrica therapy? Yes     1. Physical Functioning: Better  2. Family Relationship: Better  3. Social Relationship: Better  4. Mood: Better  5. Sleep Patterns: Better  6. Overall Function: Better    Patient ID: Jes Roper is a 70 y.o. female.    Large Joint Injection/Arthrocentesis: bilateral knee on 3/13/2025 12:42 PM  Indications: pain  Details: 25 G needle, medial approach  Medications (Right): 40 mg triamcinolone acetonide 40 mg/mL  Medications (Left): 40 mg triamcinolone acetonide 40 mg/mL  Outcome: tolerated well, no immediate complications  Procedure, treatment alternatives, risks and benefits explained, specific risks discussed. Patient was prepped and draped in the usual sterile fashion.         Marilyn Hennessy MD 03/13/25 11:34 AM

## 2025-03-22 DIAGNOSIS — R20.2 PARESTHESIA OF BOTH LOWER EXTREMITIES: ICD-10-CM

## 2025-03-22 DIAGNOSIS — F43.20 ADJUSTMENT DISORDER, UNSPECIFIED: ICD-10-CM

## 2025-03-22 DIAGNOSIS — E03.9 HYPOTHYROIDISM, UNSPECIFIED: ICD-10-CM

## 2025-03-22 DIAGNOSIS — G89.29 OTHER CHRONIC PAIN: ICD-10-CM

## 2025-03-24 RX ORDER — LEVOTHYROXINE SODIUM 50 UG/1
50 TABLET ORAL DAILY
Qty: 90 TABLET | Refills: 0 | Status: SHIPPED | OUTPATIENT
Start: 2025-03-24

## 2025-03-24 RX ORDER — TIZANIDINE 2 MG/1
4 TABLET ORAL EVERY 8 HOURS PRN
Qty: 180 TABLET | Refills: 0 | Status: SHIPPED | OUTPATIENT
Start: 2025-03-24 | End: 2026-03-24

## 2025-04-01 ENCOUNTER — APPOINTMENT (OUTPATIENT)
Dept: PRIMARY CARE | Facility: CLINIC | Age: 70
End: 2025-04-01
Payer: COMMERCIAL

## 2025-04-01 VITALS
DIASTOLIC BLOOD PRESSURE: 77 MMHG | WEIGHT: 186 LBS | HEIGHT: 60 IN | BODY MASS INDEX: 36.52 KG/M2 | SYSTOLIC BLOOD PRESSURE: 125 MMHG

## 2025-04-01 DIAGNOSIS — Z13.228 SCREENING FOR ENDOCRINE, METABOLIC AND IMMUNITY DISORDER: ICD-10-CM

## 2025-04-01 DIAGNOSIS — R68.2 DRY MOUTH AND EYES: ICD-10-CM

## 2025-04-01 DIAGNOSIS — R39.9 URINARY SYMPTOM OR SIGN: ICD-10-CM

## 2025-04-01 DIAGNOSIS — H91.93 HEARING DECREASED, BILATERAL: ICD-10-CM

## 2025-04-01 DIAGNOSIS — Z13.29 SCREENING FOR ENDOCRINE, METABOLIC AND IMMUNITY DISORDER: ICD-10-CM

## 2025-04-01 DIAGNOSIS — Z13.6 SCREENING FOR CARDIOVASCULAR CONDITION: ICD-10-CM

## 2025-04-01 DIAGNOSIS — Z00.00 MEDICARE ANNUAL WELLNESS VISIT, SUBSEQUENT: Primary | ICD-10-CM

## 2025-04-01 DIAGNOSIS — H04.123 DRY MOUTH AND EYES: ICD-10-CM

## 2025-04-01 DIAGNOSIS — R49.0 HOARSENESS: ICD-10-CM

## 2025-04-01 DIAGNOSIS — N18.31 STAGE 3A CHRONIC KIDNEY DISEASE (MULTI): ICD-10-CM

## 2025-04-01 DIAGNOSIS — I15.2 HYPERTENSION ASSOCIATED WITH DIABETES: ICD-10-CM

## 2025-04-01 DIAGNOSIS — E11.59 HYPERTENSION ASSOCIATED WITH DIABETES: ICD-10-CM

## 2025-04-01 DIAGNOSIS — Z13.0 SCREENING FOR ENDOCRINE, METABOLIC AND IMMUNITY DISORDER: ICD-10-CM

## 2025-04-01 DIAGNOSIS — R05.3 PERSISTENT COUGH: ICD-10-CM

## 2025-04-01 PROCEDURE — 1170F FXNL STATUS ASSESSED: CPT | Performed by: INTERNAL MEDICINE

## 2025-04-01 PROCEDURE — 99215 OFFICE O/P EST HI 40 MIN: CPT | Performed by: INTERNAL MEDICINE

## 2025-04-01 PROCEDURE — G0439 PPPS, SUBSEQ VISIT: HCPCS | Performed by: INTERNAL MEDICINE

## 2025-04-01 PROCEDURE — 3074F SYST BP LT 130 MM HG: CPT | Performed by: INTERNAL MEDICINE

## 2025-04-01 PROCEDURE — 4010F ACE/ARB THERAPY RXD/TAKEN: CPT | Performed by: INTERNAL MEDICINE

## 2025-04-01 PROCEDURE — 3078F DIAST BP <80 MM HG: CPT | Performed by: INTERNAL MEDICINE

## 2025-04-01 PROCEDURE — 3008F BODY MASS INDEX DOCD: CPT | Performed by: INTERNAL MEDICINE

## 2025-04-01 PROCEDURE — 1036F TOBACCO NON-USER: CPT | Performed by: INTERNAL MEDICINE

## 2025-04-01 PROCEDURE — 1123F ACP DISCUSS/DSCN MKR DOCD: CPT | Performed by: INTERNAL MEDICINE

## 2025-04-01 RX ORDER — BUDESONIDE 0.25 MG/2ML
0.25 INHALANT ORAL
Qty: 120 ML | Refills: 2 | Status: SHIPPED | OUTPATIENT
Start: 2025-04-01 | End: 2025-06-30

## 2025-04-01 RX ORDER — OXYBUTYNIN CHLORIDE 5 MG/1
5 TABLET ORAL 2 TIMES DAILY
Qty: 60 TABLET | Refills: 5 | Status: SHIPPED | OUTPATIENT
Start: 2025-04-01 | End: 2025-09-28

## 2025-04-01 ASSESSMENT — ACTIVITIES OF DAILY LIVING (ADL)
DOING_HOUSEWORK: NEEDS ASSISTANCE
MANAGING_FINANCES: NEEDS ASSISTANCE
GROCERY_SHOPPING: NEEDS ASSISTANCE
TAKING_MEDICATION: INDEPENDENT
BATHING: INDEPENDENT
DRESSING: INDEPENDENT

## 2025-04-01 ASSESSMENT — LIFESTYLE VARIABLES: HOW MANY STANDARD DRINKS CONTAINING ALCOHOL DO YOU HAVE ON A TYPICAL DAY: PATIENT DOES NOT DRINK

## 2025-04-01 NOTE — PATIENT INSTRUCTIONS
For the popping of the ear and decreased hearing , we will get that re-assessed but if they do not find anything wrong we will see ENT     Your kidney function improved , please continue drinking your water   Cough may be a combination of dry throat also causing the voice hoarseness   Since your lungs are clear , the cough was before the new medication,    Restart your pulmicort to see if this makes a difference   We will do your blood work to check for sjogren's     Try to replace the powdered donuts with eggs / egg white , like mini quiches freeze them and microwave    Rearrange your plates so you can reach them

## 2025-04-01 NOTE — PROGRESS NOTES
"Subjective   Patient ID: Jes Roper \"Jeff" is a 70 y.o. female who presents for No chief complaint on file..    HPI    Patient in for a visit  She is recovering from covid, her voice comes and goes may be due to the MS  She is taking her reflux medication  Went to PT for dizziness a couple of years ago it worked well she still has the popping in her right ear cannot tell if left too   Patient comes in for an [facility] Annual Wellness Visit  last one done over a year ago , doing well over-all with no particular complaints. Also is in for laboratory review and health maintenance update.  Updating family history as well.  Interval event - past medical history, surgical, social, and family history reviewed and updated.  Interval care -  Patient is    up to date with dental care.  Patient does     receive routine vision care.        Review of Systems  General: Denies fever, chills, night sweats,  Eyes: Negative for recent visual changes  Ears, Nose, Throat :  Negative for hearing changes, sinus discomfort  Dermatologic: Negative for new skin conditions, rash  Respiratory: Negative for wheezing, shortness of breath, cough  Cardiovascular: Negative for chest pain, palpitations, or leg swelling  Gastrointestinal: Negative for nausea/vomiting, abdominal pain, changes in bowel habits  Genitourinary Negative for Urinary Incontinence  urgency , frequency, discomfort   Musculoskeletal: see hpi  Neurological: Negative for headaches, dizziness    Previous history  Past Medical History:   Diagnosis Date    Abnormal findings on diagnostic imaging of other specified body structures 12/30/2022    Abnormal chest CT    Anemia, unspecified 12/17/2021    Anemia    Anxiety disorder, unspecified 09/26/2013    Anxiety    Cervicalgia 12/06/2019    Neck pain    Chronic cough 09/22/2022    Persistent cough    Disorder of kidney and ureter, unspecified 12/30/2022    Abnormal renal function    Disorder of lipoprotein metabolism, unspecified " 06/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    Abnormal serum cholesterol    Disorder of pigmentation, unspecified 03/22/2022    Discoloration of skin of toe    Dizziness and giddiness 07/01/2022    Dizziness    Dorsalgia, unspecified 12/30/2022    Back pain    Edema, unspecified 07/17/2019    Edema    Encounter for fitting and adjustment of other specified devices 01/19/2021    Fitting and adjustment of pessary    Encounter for general adult medical examination without abnormal findings 03/22/2022    Encounter for Medicare annual wellness exam    Encounter for gynecological examination (general) (routine) without abnormal findings 12/05/2018    Visit for gynecologic examination    Encounter for gynecological examination (general) (routine) without abnormal findings 03/17/2016    Encounter for gynecological examination without abnormal finding    Encounter for other screening for malignant neoplasm of breast 11/01/2019    Encounter for screening for malignant neoplasm of breast    Encounter for other screening for malignant neoplasm of breast 03/22/2022    Breast screening    Fibromyalgia 09/29/2022    Fibromyalgia    Hyperlipidemia     Hypertension     Hypothyroidism, unspecified 03/22/2022    Hypothyroidism    Local infection of the skin and subcutaneous tissue, unspecified 02/22/2019    Toe infection    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[facility])     Multiple sclerosis (Multi) 12/06/2022    Multiple sclerosis    Obesity, unspecified 11/21/2016    Mild obesity    Other age-related incipient cataract, bilateral 09/22/2022    Other age-related incipient cataract of both eyes    Other chronic pain 07/01/2022    Chronic pain    Other specified disorders of urethra 09/08/2020    Prolapse of urethra    Pain in right knee 06/22/2022    Right knee pain    Peripheral vascular disease, unspecified (CMS-HCC) 04/07/2022    Arterial insufficiency of lower extremity    Personal history of other diseases of urinary system 05/06/2020    History of prolapse of  bladder    Personal history of other endocrine, nutritional and metabolic disease 2014    History of hypothyroidism    Personal history of other specified conditions 2022    History of chronic cough    Sciatica, right side 2019    Right sciatic nerve pain    Sleep apnea, unspecified 04/10/2017    Sleep apnea    Torticollis 2022    Torticollis    Tremor, unspecified 2022    Tremor of both hands    Unspecified cataract     Cataracts, both eyes    Unspecified skin changes 2022    Skin change    Unspecified symptoms and signs involving the genitourinary system 2021    Urinary symptom or sign     Past Surgical History:   Procedure Laterality Date     SECTION, CLASSIC  10/03/2018     Section    GALLBLADDER SURGERY  2020    Gallbladder Surgery    OTHER SURGICAL HISTORY  2021    Shoulder replacement    OTHER SURGICAL HISTORY  2020    Exploratory laparotomy    OTHER SURGICAL HISTORY  2020    Urethroplasty     Social History     Tobacco Use    Smoking status: Never    Smokeless tobacco: Never   Vaping Use    Vaping status: Never Used   Substance Use Topics    Alcohol use: Never    Drug use: Never     Family History   Problem Relation Name Age of Onset    Diabetes Mother      Hypertension Mother      Skin cancer Mother      Other (HEPATIC CIRRHOSIS [Other]) Father      No Known Problems Sister      Other (AMD) Brother      Retinal detachment Daughter Adilia     Retinal detachment Son Aureliano     Cervical cancer Mother's Sister      Cervical cancer Maternal Grandmother       Allergies   Allergen Reactions    Naltrexone Unknown    Penicillin Unknown     Current Outpatient Medications   Medication Instructions    baclofen (Lioresal) 10 mg tablet Take 0.5 tablets (5 mg) by mouth once daily at bedtime.    benzonatate (Tessalon) 100 mg capsule TAKE 1 CAPSULE BY MOUTH THREE TIMES DAILY AS NEEDED for cough for up to 7 days (do not crush or CHEW)     cholecalciferol (VITAMIN D-3) 2,000 Units, Daily RT    cyanocobalamin (VITAMIN B-12) 100 mcg, Daily    dapagliflozin propanediol (FARXIGA) 10 mg, oral, Daily    DULoxetine (CYMBALTA) 60 mg, Daily RT    estradiol (Estrace) 0.01 % (0.1 mg/gram) vaginal cream Place a dime sized amount vaginally  3 times a week    fluticasone (Flonase) 50 mcg/actuation nasal spray 2 sprays, Daily RT    hyoscyamine ER (Levbid) 0.375 mg 12 hr tablet 1 tablet, Daily    levothyroxine (SYNTHROID, LEVOXYL) 50 mcg, oral, Daily, as directed    losartan (COZAAR) 50 mg, oral, Daily RT    metoprolol succinate XL (TOPROL-XL) 200 mg, oral, Daily    modafinil (PROVIGIL) 200 mg, 2 times daily    multivit-min-iron-FA-vit K-lut (Centrum Silver Women) 8 mg iron-400 mcg-50 mcg tablet 1 tablet, Daily    ofatumumab (Kesimpta Pen) 20 mg/0.4 mL injection     omeprazole (PRILOSEC) 40 mg, oral, Daily before breakfast    potassium chloride CR (Klor-Con M20) 20 mEq ER tablet 20 mEq, oral, Daily, Do not crush or chew.    pregabalin (LYRICA) 150 mg, oral, 2 times daily    rosuvastatin (CRESTOR) 5 mg, oral, Daily    spironolactone (ALDACTONE) 25 mg, oral, Daily    tiZANidine (ZANAFLEX) 4 mg, oral, Every 8 hours PRN    torsemide (DEMADEX) 20 mg, oral, Daily       Objective       Physical Exam  Vital Signs: as recorded above  General: Well groomed, well nourished   Orientation:  Alert , oriented to time, place , and person   Mood and Affect:  Cooperative , no apparent distress normal affect  Skin: Good color, good turgor  Eyes: Extra ocular muscle movements intact, anicteric sclerae  Neck: Supple, full range of movement  Chest: Normal breath sounds, normal chest wall exam, symmetric, good air entry, clear to auscultation  Heart: Regular rate and rhythm, without murmur, gallop, or rubs  BACK:  no CTLS spine tenderness, no flank tenderness  Extremities: full range of movement  bilateral UE and bilateral LE,  no lower extremity edema  Neurological: Alert, oriented,  "cranial nerves II-XII grossly intact except for visual acuity  Sensation:  Intact   Gait: using her walker steady      Assessment/Plan   Jes Roper \"Helen\" is a 70 y.o. female who presents for the concerns below:    Problem List Items Addressed This Visit    None    For the popping of the ear and decreased hearing , we will get that re-assessed but if they do not find anything wrong we will see ENT      HYPERGLYCEMIA PLAN: her urine had +3 glucose with hba1c 5.8  without  medications currently  , need to follow Diabetic diet,  urine microalbumin utd , ophthalmology exam.  Need Podiatry checks periodically.  CKD 3A kidney function improved , please continue drinking your water   HYPERKALEMIA last potassium level is good will recheck      WILL be one year in July Grieving pt ws talking to someone at [place] but not seen since     DRY EYE dry mouth understanding she is on medications , I cannot see if tests for sjogrens were done will  do it along with bloodwork  kidney function improved , please continue drinking your water   PERSISTENT Cough may be a combination of dry throat also causing the voice hoarseness Restart pulmicort   Since your lungs are clear We will do your bloodwork to check for sjogren's   HYPERTENSION PLAN:  , taking blood pressure medication  Follow low salt diet, exercise as discussed, weight control. Continue current medications  Seeing cardiology this month   HYPERCHOLESTEROLEMIA PLAN: stable  continue current medications  Follow low cholesterol diet, encouraged high omega 3 fatty acid intake in diet, exercise, weight control. . Will Obtain AST/ALT, fasting lipid profile, creatine kinase  on statin if not done within past 3-6 months . Coenzyme Q10 200 mg a day if able to help increase HDL.  Try to replace the powdered donuts with eggs / egg white , like mini quiches freeze them and microwave    Rearrange your plates so you can reach them   URINARY FREQUENCY oxybutinin bid helps but " knowing this also contributes to dry mouth     Annual Wellness Visit  the risks and benefits of influenza vaccination were discussed with the patient, influenza vaccine is up to date this year  the risks and benefits of Prevnar 20  vaccination were discussed with the patient, Prevnar 20 vaccine is    up to date  the risks and benefits of pneumonia vaccination were discussed with the patient, pneumonia vaccine is     up to date   the risks and benefits of  Shingrix  vaccination were discussed with the patient, Shingrix  vaccine is   not     up to date there is a shortage of the vaccine  the risks and benefits of tetanus vaccination were discussed with the patient, tetanus vaccine is      up to date   Cardiovascular screening and counseling the risk and benefits of screening were discussed counseling on maintaining a healthy diet and due for lipid panel  Breast cancer screening and counseling , the risks and benefits of screening were discussed with the patient, and screening is current     order is in  Cervical cancer screening and counseling , the risks and benefits of screening were discussed with the patient, and screening is current     order is in  Osteoporosis screening and counseling was given on obtaining adequate amounts of calcium and vitamin D on a daily basis and weight bearing exercise such as walking  , the risks and benefits of screening were discussed with the patient, and screening is current     order is in  Colorectal cancer screening and counseling; the risks and benefits of screening were discussed with the patient, colon cancer screening is     up to date , order is in   Abdominal aortic aneurysm screening and counseling,  the risks and benefits of screening were discussed with the patient, screening is not indicated   Visual acuity / Glaucoma screening and counseling , the risks and benefits of vision screening were discussed with the patient, screening is current   HIV screening and  Counseling, the risks and benefits of screening were discussed with the patient, screening is not indicated   Advance directive planning : needs to be updated information forms given to patient .  complete and up  to date   Other Preventive Counseling Provided :  fall risk reduction  seat belt use, sunscreen use , and increasing physical activity .  Patient Discussion:  plan discussed with the patient and follow-up visit needed     Discussed with:   Return in :  June for the wellness check   Portions of this note were generated using digital voice recognition software, and may contain grammatical errors       Ambrosio Gallegos MD  04/01/25  1:18 PM

## 2025-04-02 LAB
ALBUMIN SERPL-MCNC: 4.2 G/DL (ref 3.6–5.1)
ALP SERPL-CCNC: 155 U/L (ref 37–153)
ALT SERPL-CCNC: 28 U/L (ref 6–29)
ANION GAP SERPL CALCULATED.4IONS-SCNC: 8 MMOL/L (CALC) (ref 7–17)
AST SERPL-CCNC: 24 U/L (ref 10–35)
BILIRUB SERPL-MCNC: 0.4 MG/DL (ref 0.2–1.2)
BUN SERPL-MCNC: 20 MG/DL (ref 7–25)
CALCIUM SERPL-MCNC: 9.4 MG/DL (ref 8.6–10.4)
CHLORIDE SERPL-SCNC: 104 MMOL/L (ref 98–110)
CHOLEST SERPL-MCNC: 173 MG/DL
CHOLEST/HDLC SERPL: 3.4 (CALC)
CO2 SERPL-SCNC: 29 MMOL/L (ref 20–32)
CREAT SERPL-MCNC: 0.95 MG/DL (ref 0.5–1.05)
EGFRCR SERPLBLD CKD-EPI 2021: 65 ML/MIN/1.73M2
ENA SS-A AB SER IA-ACNC: NORMAL AI
ENA SS-B AB SER IA-ACNC: NORMAL AI
GLUCOSE SERPL-MCNC: 89 MG/DL (ref 65–139)
HDLC SERPL-MCNC: 51 MG/DL
LDLC SERPL CALC-MCNC: 102 MG/DL (CALC)
NONHDLC SERPL-MCNC: 122 MG/DL (CALC)
POTASSIUM SERPL-SCNC: 4.5 MMOL/L (ref 3.5–5.3)
PROT SERPL-MCNC: 6.9 G/DL (ref 6.1–8.1)
SODIUM SERPL-SCNC: 141 MMOL/L (ref 135–146)
TRIGL SERPL-MCNC: 105 MG/DL

## 2025-04-17 ENCOUNTER — APPOINTMENT (OUTPATIENT)
Dept: CARDIOLOGY | Facility: CLINIC | Age: 70
End: 2025-04-17
Payer: COMMERCIAL

## 2025-04-17 VITALS
OXYGEN SATURATION: 95 % | DIASTOLIC BLOOD PRESSURE: 67 MMHG | BODY MASS INDEX: 37.21 KG/M2 | HEART RATE: 75 BPM | WEIGHT: 189.5 LBS | SYSTOLIC BLOOD PRESSURE: 104 MMHG | HEIGHT: 60 IN

## 2025-04-17 DIAGNOSIS — E78.5 DYSLIPIDEMIA: ICD-10-CM

## 2025-04-17 DIAGNOSIS — I10 ESSENTIAL HYPERTENSION, BENIGN: ICD-10-CM

## 2025-04-17 DIAGNOSIS — I50.22 CHRONIC SYSTOLIC HEART FAILURE: Primary | ICD-10-CM

## 2025-04-17 DIAGNOSIS — R06.09 DYSPNEA ON EXERTION: ICD-10-CM

## 2025-04-17 PROCEDURE — 1125F AMNT PAIN NOTED PAIN PRSNT: CPT | Performed by: INTERNAL MEDICINE

## 2025-04-17 PROCEDURE — 1123F ACP DISCUSS/DSCN MKR DOCD: CPT | Performed by: INTERNAL MEDICINE

## 2025-04-17 PROCEDURE — 4010F ACE/ARB THERAPY RXD/TAKEN: CPT | Performed by: INTERNAL MEDICINE

## 2025-04-17 PROCEDURE — 1159F MED LIST DOCD IN RCRD: CPT | Performed by: INTERNAL MEDICINE

## 2025-04-17 PROCEDURE — 3078F DIAST BP <80 MM HG: CPT | Performed by: INTERNAL MEDICINE

## 2025-04-17 PROCEDURE — 3074F SYST BP LT 130 MM HG: CPT | Performed by: INTERNAL MEDICINE

## 2025-04-17 PROCEDURE — G2211 COMPLEX E/M VISIT ADD ON: HCPCS | Performed by: INTERNAL MEDICINE

## 2025-04-17 PROCEDURE — 3008F BODY MASS INDEX DOCD: CPT | Performed by: INTERNAL MEDICINE

## 2025-04-17 PROCEDURE — 1036F TOBACCO NON-USER: CPT | Performed by: INTERNAL MEDICINE

## 2025-04-17 PROCEDURE — 99214 OFFICE O/P EST MOD 30 MIN: CPT | Performed by: INTERNAL MEDICINE

## 2025-04-17 PROCEDURE — 1160F RVW MEDS BY RX/DR IN RCRD: CPT | Performed by: INTERNAL MEDICINE

## 2025-04-17 RX ORDER — LOSARTAN POTASSIUM 25 MG/1
25 TABLET ORAL
Qty: 30 TABLET | Refills: 11 | Status: SHIPPED | OUTPATIENT
Start: 2025-04-17 | End: 2025-04-17 | Stop reason: SDUPTHER

## 2025-04-17 RX ORDER — LOSARTAN POTASSIUM 25 MG/1
25 TABLET ORAL
Qty: 30 TABLET | Refills: 11 | Status: SHIPPED | OUTPATIENT
Start: 2025-04-17

## 2025-04-17 RX ORDER — ROSUVASTATIN CALCIUM 5 MG/1
5 TABLET, COATED ORAL DAILY
Qty: 30 TABLET | Refills: 11 | Status: SHIPPED | OUTPATIENT
Start: 2025-04-17 | End: 2026-04-17

## 2025-04-17 ASSESSMENT — PATIENT HEALTH QUESTIONNAIRE - PHQ9
5. POOR APPETITE OR OVEREATING: NEARLY EVERY DAY
7. TROUBLE CONCENTRATING ON THINGS, SUCH AS READING THE NEWSPAPER OR WATCHING TELEVISION: NEARLY EVERY DAY
3. TROUBLE FALLING OR STAYING ASLEEP OR SLEEPING TOO MUCH: NEARLY EVERY DAY
SUM OF ALL RESPONSES TO PHQ9 QUESTIONS 1 AND 2: 4
6. FEELING BAD ABOUT YOURSELF - OR THAT YOU ARE A FAILURE OR HAVE LET YOURSELF OR YOUR FAMILY DOWN: NEARLY EVERY DAY
9. THOUGHTS THAT YOU WOULD BE BETTER OFF DEAD, OR OF HURTING YOURSELF: MORE THAN HALF THE DAYS
2. FEELING DOWN, DEPRESSED OR HOPELESS: SEVERAL DAYS
4. FEELING TIRED OR HAVING LITTLE ENERGY: NEARLY EVERY DAY
SUM OF ALL RESPONSES TO PHQ QUESTIONS 1-9: 21
1. LITTLE INTEREST OR PLEASURE IN DOING THINGS: NEARLY EVERY DAY
10. IF YOU CHECKED OFF ANY PROBLEMS, HOW DIFFICULT HAVE THESE PROBLEMS MADE IT FOR YOU TO DO YOUR WORK, TAKE CARE OF THINGS AT HOME, OR GET ALONG WITH OTHER PEOPLE: EXTREMELY DIFFICULT
8. MOVING OR SPEAKING SO SLOWLY THAT OTHER PEOPLE COULD HAVE NOTICED. OR THE OPPOSITE, BEING SO FIGETY OR RESTLESS THAT YOU HAVE BEEN MOVING AROUND A LOT MORE THAN USUAL: NOT AT ALL

## 2025-04-17 ASSESSMENT — ENCOUNTER SYMPTOMS
OCCASIONAL FEELINGS OF UNSTEADINESS: 1
LOSS OF SENSATION IN FEET: 0
DEPRESSION: 1

## 2025-04-17 ASSESSMENT — PAIN SCALES - GENERAL: PAINLEVEL_OUTOF10: 3

## 2025-04-17 NOTE — PATIENT INSTRUCTIONS
You were seen in the [facility] for your chronic systolic heart failure and shortness of breath (the medical term is called dyspnea).     Your shortness of breath stable compared to 4 months ago and is likely due to a combination of your heart failure (chronic systolic heart failure), and skeletal muscle deconditioning from your MS and fibromyalgia.      Your repeat August 2019 echocardiogram showed that your heart muscle squeezing strength (LV ejection fraction) was stable at 40%.      You are on good heart failure medical therapy with:  1. Metoprolol  mg a day  2. RESTART losartan at a lower dose of 25 mg a day from prior 50 mg a day. Can hold for systolic blood pressure < 90 mm Hg.   3. Farxiga 10 mg a day  4. Spironolactone 25 mg a day.     Continue to take torsemide 10-20 mg a day. You can take a 1/2 tablet (10 mg) now in the summer to see if this helps your blood pressure stay in normal range. Weight yourself every day, if you gain 2 pounds overnight, take a full 20 mg tablet that morning.    I recommend we continue rosuvastatin 5 mg a day to lower your cholesterol. Your LDL (bad) cholesterol was 115 better than 153 2 years ago but above long term goal < 100 to protect your heart arteries. You do not need to take aspirin 81 mg a day. I sent a new prescription for rosuvastatin 5 mg a day to your pharmacy.     Follow up with Dr. Mead in 4 months.

## 2025-04-17 NOTE — PROGRESS NOTES
"Subjective   Jes Roper \"Helen\" is a 70 y.o. female who presents to the [facility]  for follow up of chronic systolic heart failure and chronic exertional dyspnea. Last seen in 2024.     Since our last visit, Ms. Roper has stable NYHA class II symptoms with exertional dyspnea with climbing 1 flight of stairs, walking 3-5 minutes at a time (up from walking 1-2 aisle at the grocery store prior to starting Farxiga 10 mg a day in 2020). Has been sedentary since her   of liver cancer in 2024. Seeing a therapist for bereavement.    Her leg edema is stable compared to prior visit. Now trace KAT taking torsemide 10 mg a day.     Had COVID in early 2025. Had some low BP readings recovering from the virus. Is not taking losartan 50 mg a day and rosuvastatin 5 mg a day on review of current medication list that was prescribed at last visit with me.    Using mobility aids (Rolator) for walking. No syncope. Has fibromyalgia and MS with chronic pains in the legs and shoulders most prominently R sciatica pain. These are variable day-to-day but on average worse than 4 months ago.     No chest pain, PND, orthopnea, KAT, palpitations, syncope, or claudication. Has episodes of fingers/toes turning blue (no formal diagnosis of Raynaud's phenomenon).      Repeat TTE in 2019 showed stable LV EF 40%.     Dyspnea work up:  1. 2018 TTE: LV EF ~40%, no LVH (LVMI 80 gm//m2), impaired relaxation diastology (E/e' 8), normal LA size, normal RV/RA, trivial AI, trace MR, trace TR, unable to estimate RVSP.  2. 2017 PFTs: FEV1 2.11 L (93% pred); FVC 2.63 L (89% pred), FEV1/FVC ratio 80% (103% pred), DLCO 13.46 (66% pred), DLCO/VA 3.87 (85%) diffusion capacity mildly reduced; TLC 4.69 L (106% pred), RV/TLC ratio 45% (normal), RV 2.13 L (121% pred).     Past Medical History:  1. Chronic systolic heart failure: 2018 LV EF 45%  2. Multiple sclerosis  3. " Fibromyalgia  4. Hypertension  5. GHULAM on CPAP  6. Bereavement     Social History:  Non-smoker.  ( passed in July 2024 since our last visit)     Family History:  No premature CAD in 1st degree relatives ( 55 years of age for male relatives, 65 years of age for female relatives)     Review of Systems    A 14 point review of systems was asked. All questions were negative except for pertinent positives listed in the HPI.      Objective   Physical Exam  BP Readings from Last 3 Encounters:   04/17/25 104/67   04/01/25 125/77   03/13/25 111/72      Wt Readings from Last 3 Encounters:   04/17/25 86 kg (189 lb 8 oz)   04/01/25 84.4 kg (186 lb)   03/13/25 85.5 kg (188 lb 6.4 oz)      BMI: Estimated body mass index is 37.01 kg/m² as calculated from the following:    Height as of this encounter: 1.524 m (5').    Weight as of this encounter: 86 kg (189 lb 8 oz).  BSA: Estimated body surface area is 1.91 meters squared as calculated from the following:    Height as of this encounter: 1.524 m (5').    Weight as of this encounter: 86 kg (189 lb 8 oz).    General: no acute distress  HEENT: EOMI, no scleral icterus.  Lungs: Clear to auscultation bilaterally without wheezing, rales, or rhonchi.  Cardiovascular: Regular rhythm and rate. Normal S1 and S2. No murmurs, rubs, or gallops are appreciated. JVP normal.  Abdomen: Soft, nontender, nondistended. Bowel sounds present.  Extremities: Warm and well perfused with equal 2+ pulses bilaterally.  No edema present.  Neurologic: Alert and oriented x3.    I have personally reviewed the following images and laboratory findings:  Last echocardiogram:   Most recent echo, 8/27/2019: LV EF ~40%, no LVH (LVMI 70 gm/m2), impaired relaxation diastology (E/e' 11.8), normal LA size (LYNN 27 ml/m2), normal RV/RA, trivial AI, trace MR/TR, unable to estimate RVSP.     Last cath / stress test / CACS:  12/23/2019 CT chest: Mild coronary artery calcifications present    Most recent  "EC2024 ECG: Sinus rhythm, 76 bpm, normal ECG. Personally reviewed in office.    2022 ECG: Sinus rhythm, 81 bpm, Normal ECG. Personally reviewed in office.     Lab Results   Component Value Date    CHOL 173 2025    CHOL 194 2024    CHOL 235 (H) 2022     Lab Results   Component Value Date    HDL 51 2025    HDL 46.1 2024    HDL 53.6 2022     Lab Results   Component Value Date    LDLCALC 102 (H) 2025    LDLCALC 115 (H) 2024     Lab Results   Component Value Date    TRIG 105 2025    TRIG 167 (H) 2024    TRIG 143 2022     No components found for: \"CHOLHDL\"   2023      Assessment/Plan   1. Chronic dyspnea:  Multi-factorial dyspnea due to the following causes: 1) Cardiomyopathy / chronic systolic heart failure 2) hypertension 3) Fibromyalgia / MS 4) Skeletal muscle deconditioning     - repeat 2019 echocardiogram shows stable LV EF of 40%. She has stable NYHA class II symptoms.  - continue PT program exercises. Walking for a few minutes at a time for skeletal muscle conditioning as tolerated by MS fatigue symptomatology. Can also use aquatherapy for physical conditioning program that can reduce impact on joints during exercise. LV EF is > 35% so patient does not qualify for cardiac rehab exercise program under a heart failure indication.      2. Chronic systolic heart failure:  Stable NYHA class II symptoms. LV EF 40% on 2019 echocardiogram She is on optimal medical therapy for neurohormonal remodeling with metoprolol  mg a day, Farxiga 10 mg a day, and spironolactone 25 mg a day for neurohormonal remodeling. Continue torsemide 10-20 mg a day. Will restart losartan 25 mg a day for ARB at low dose.     Continue Farxiga 10 mg a day. This medication has provided improvement in severe dyspnea and renal function on recent blood work shows improved estimated GFR. She meets the criteria of enrolled patients for the DAPA-HF " trial of Farxiga (dapagliflozin) 10 mg a day for prevention of worsening heart failure or cardiovascular death. I renewed her paper work for the [employer] patient assistance program in late 2023 for another 12 months through end of 2024. Initial application was approved in February 2023.      3. Hypertension:  Blood pressure at goal on current medications.    4. Dyslipidemia:  LDL improved with lifestyle changes to 115 from 153. Mild coronary arteriosclerosis present on 12/2019 CT scan. Goal LDL < 100. Recommend low dose statin (rosuvastatin 5 mg a day). She does not need to take aspirin 81 mg a day.     Follow up with Dr. Mead in 6 months.     Time = 30 minutes on direct patient care reviewing current symptoms and discussing treatment options, reviewing prior cardiac care, and coordinating follow up testing and medication renewal.        SIGNATURE: Aguila Mead MD PATIENT NAME: Jes Roper   DATE/TIME: April 17, 2025 3:47 PM MRN: [id number]

## 2025-04-30 DIAGNOSIS — R20.2 PARESTHESIA OF BOTH LOWER EXTREMITIES: ICD-10-CM

## 2025-04-30 DIAGNOSIS — F43.20 ADJUSTMENT DISORDER, UNSPECIFIED: ICD-10-CM

## 2025-04-30 DIAGNOSIS — G89.29 OTHER CHRONIC PAIN: ICD-10-CM

## 2025-05-01 RX ORDER — TIZANIDINE 2 MG/1
TABLET ORAL
Qty: 180 TABLET | Refills: 0 | Status: SHIPPED | OUTPATIENT
Start: 2025-05-01

## 2025-05-02 ENCOUNTER — TELEMEDICINE (OUTPATIENT)
Facility: HOSPITAL | Age: 70
End: 2025-05-02
Payer: COMMERCIAL

## 2025-05-02 DIAGNOSIS — N32.81 OAB (OVERACTIVE BLADDER): ICD-10-CM

## 2025-05-02 DIAGNOSIS — N39.46 MIXED INCONTINENCE: ICD-10-CM

## 2025-05-02 DIAGNOSIS — N39.3 SUI (STRESS URINARY INCONTINENCE, FEMALE): ICD-10-CM

## 2025-05-02 DIAGNOSIS — R35.0 URINARY FREQUENCY: Primary | ICD-10-CM

## 2025-05-02 PROCEDURE — 4010F ACE/ARB THERAPY RXD/TAKEN: CPT | Performed by: STUDENT IN AN ORGANIZED HEALTH CARE EDUCATION/TRAINING PROGRAM

## 2025-05-02 PROCEDURE — 99214 OFFICE O/P EST MOD 30 MIN: CPT | Performed by: STUDENT IN AN ORGANIZED HEALTH CARE EDUCATION/TRAINING PROGRAM

## 2025-05-02 PROCEDURE — 1123F ACP DISCUSS/DSCN MKR DOCD: CPT | Performed by: STUDENT IN AN ORGANIZED HEALTH CARE EDUCATION/TRAINING PROGRAM

## 2025-05-02 NOTE — PROGRESS NOTES
Virtual or Telephone Consent    While technically available, the patient was unable or unwilling to consent to connect via audio/video telehealth technology; therefore, I performed this visit using a real-time audio only connection between Jes Roper & Renato Murphy MD.  Verbal consent was requested and obtained from Jes Roper on this date, 05/02/25 for a telehealth visit and the patient's location was confirmed at the time of the visit.           HISTORY OF PRESENT ILLNESS:  Patient with MS mixed urinary incontinence previously saw Dr. Pope and Katey was fitted with a pessary which did not help.  Also tried Detrol gemtesa and Myrbetriq.  The latter 2 were too expensive.  She is currently on oxybutynin 5 mg twice daily which is not helping at all her incontinence is actually getting worse.  She is interested in third line therapy         Past Medical History  She has a past medical history of Abnormal findings on diagnostic imaging of other specified body structures (12/30/2022), Anemia, unspecified (12/17/2021), Anxiety disorder, unspecified (09/26/2013), Cervicalgia (12/06/2019), Chronic cough (09/22/2022), Disorder of kidney and ureter, unspecified (12/30/2022), Disorder of lipoprotein metabolism, unspecified (06/26/2015), Disorder of pigmentation, unspecified (03/22/2022), Dizziness and giddiness (07/01/2022), Dorsalgia, unspecified (12/30/2022), Edema, unspecified (07/17/2019), Encounter for fitting and adjustment of other specified devices (01/19/2021), Encounter for general adult medical examination without abnormal findings (03/22/2022), Encounter for gynecological examination (general) (routine) without abnormal findings (12/05/2018), Encounter for gynecological examination (general) (routine) without abnormal findings (03/17/2016), Encounter for other screening for malignant neoplasm of breast (11/01/2019), Encounter for other screening for malignant neoplasm of breast (03/22/2022),  Fibromyalgia (2022), Hyperlipidemia, Hypertension, Hypothyroidism, unspecified (2022), Local infection of the skin and subcutaneous tissue, unspecified (2019), 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[facility]), Multiple sclerosis (Multi) (2022), Obesity, unspecified (2016), Other age-related incipient cataract, bilateral (2022), Other chronic pain (2022), Other specified disorders of urethra (2020), Pain in right knee (2022), Peripheral vascular disease, unspecified (CMS-HCC) (2022), Personal history of other diseases of urinary system (2020), Personal history of other endocrine, nutritional and metabolic disease (2014), Personal history of other specified conditions (2022), Sciatica, right side (2019), Sleep apnea, unspecified (04/10/2017), Torticollis (2022), Tremor, unspecified (2022), Unspecified cataract, Unspecified skin changes (2022), and Unspecified symptoms and signs involving the genitourinary system (2021).    Surgical History  She has a past surgical history that includes Other surgical history (2021); Other surgical history (2020); Other surgical history (2020);  section, classic (10/03/2018); and Gallbladder surgery (2020).     Social History  She reports that she has never smoked. She has never been exposed to tobacco smoke. She has never used smokeless tobacco. She reports that she does not drink alcohol and does not use drugs.    Family History  Family History[1]     Allergies  Naltrexone and Penicillin      A comprehensive 10+ review of systems was negative except for: see hpi                        Assessment:    68-year-old with multiple sclerosis and history of mixed incontinence      MATTHEW    -discussed mechanism of UUI and ALVA, and treatment options for both including PFT, pessary, sling for ALVA and PFT, pharmacotherapy and third-line therapy for OAB    - has failed  Detrol gemtesa and Myrbetriq, currently oxybutynin is not helping    We discussed botox vs sacral neuromodulation: both have similar efficacy 80% patients reports >50% improvement, botox associated with 5% risk of incomplete emptying, increase in UTI and will require re-injection in 6-9 months; and as early as 3 months. SNM is a staged procedure, 2 weeks apart, consisting first of lead implantation then internalization of IPG if there is improvement. Interstim is associated with lead migration, explantation, infection and bleeding, though risks are all <5%. We also discussed PTNS which is associated with success rates comparable to medical therapy but without side-effects without significant major morbidity.-    -  plan on UDS and follow-up      Renato Murphy MD           [1]   Family History  Problem Relation Name Age of Onset    Diabetes Mother      Hypertension Mother      Skin cancer Mother      Other (HEPATIC CIRRHOSIS [Other]) Father      No Known Problems Sister      Other (AMD) Brother      Retinal detachment Daughter Adilia     Retinal detachment Son Aureliano     Cervical cancer Mother's Sister      Cervical cancer Maternal Grandmother

## 2025-05-03 DIAGNOSIS — R39.9 URINARY SYMPTOM OR SIGN: ICD-10-CM

## 2025-05-05 RX ORDER — OXYBUTYNIN CHLORIDE 5 MG/1
5 TABLET ORAL 2 TIMES DAILY
Qty: 60 TABLET | Refills: 5 | Status: SHIPPED | OUTPATIENT
Start: 2025-05-05

## 2025-05-11 ENCOUNTER — PATIENT MESSAGE (OUTPATIENT)
Dept: PRIMARY CARE | Facility: CLINIC | Age: 70
End: 2025-05-11
Payer: COMMERCIAL

## 2025-05-11 DIAGNOSIS — I15.2 HYPERTENSION ASSOCIATED WITH DIABETES: ICD-10-CM

## 2025-05-11 DIAGNOSIS — N18.31 STAGE 3A CHRONIC KIDNEY DISEASE (MULTI): Primary | ICD-10-CM

## 2025-05-11 DIAGNOSIS — E11.59 HYPERTENSION ASSOCIATED WITH DIABETES: ICD-10-CM

## 2025-05-13 ENCOUNTER — TELEPHONE (OUTPATIENT)
Dept: RHEUMATOLOGY | Facility: CLINIC | Age: 70
End: 2025-05-13
Payer: COMMERCIAL

## 2025-05-13 DIAGNOSIS — M79.7 FIBROMYALGIA: ICD-10-CM

## 2025-05-13 RX ORDER — PREGABALIN 150 MG/1
150 CAPSULE ORAL 2 TIMES DAILY
Qty: 60 CAPSULE | Refills: 3 | Status: SHIPPED | OUTPATIENT
Start: 2025-05-13

## 2025-05-13 RX ORDER — PREGABALIN 150 MG/1
150 CAPSULE ORAL 2 TIMES DAILY
Qty: 60 CAPSULE | Refills: 3 | OUTPATIENT
Start: 2025-05-13

## 2025-05-20 LAB
ALBUMIN/CREAT UR: 82 MG/G CREAT
CREAT UR-MCNC: 17 MG/DL (ref 20–275)
MICROALBUMIN UR-MCNC: 1.4 MG/DL

## 2025-06-12 DIAGNOSIS — E03.9 HYPOTHYROIDISM, UNSPECIFIED: ICD-10-CM

## 2025-06-12 RX ORDER — LEVOTHYROXINE SODIUM 50 UG/1
50 TABLET ORAL DAILY
Qty: 90 TABLET | Refills: 0 | Status: SHIPPED | OUTPATIENT
Start: 2025-06-12

## 2025-06-28 DIAGNOSIS — F43.20 ADJUSTMENT DISORDER, UNSPECIFIED: ICD-10-CM

## 2025-06-28 DIAGNOSIS — G89.29 OTHER CHRONIC PAIN: ICD-10-CM

## 2025-06-28 DIAGNOSIS — R20.2 PARESTHESIA OF BOTH LOWER EXTREMITIES: ICD-10-CM

## 2025-06-30 RX ORDER — TIZANIDINE 2 MG/1
TABLET ORAL
Qty: 180 TABLET | Refills: 0 | Status: SHIPPED | OUTPATIENT
Start: 2025-06-30

## 2025-07-02 ENCOUNTER — OFFICE VISIT (OUTPATIENT)
Dept: RHEUMATOLOGY | Facility: CLINIC | Age: 70
End: 2025-07-02
Payer: COMMERCIAL

## 2025-07-02 VITALS
WEIGHT: 185.6 LBS | OXYGEN SATURATION: 97 % | HEART RATE: 77 BPM | BODY MASS INDEX: 36.44 KG/M2 | DIASTOLIC BLOOD PRESSURE: 55 MMHG | SYSTOLIC BLOOD PRESSURE: 90 MMHG | HEIGHT: 60 IN

## 2025-07-02 DIAGNOSIS — Z79.899 ENCOUNTER FOR LONG-TERM (CURRENT) USE OF MEDICATIONS: ICD-10-CM

## 2025-07-02 DIAGNOSIS — M79.7 FIBROMYALGIA: ICD-10-CM

## 2025-07-02 DIAGNOSIS — M19.012 PRIMARY OSTEOARTHRITIS OF LEFT SHOULDER: ICD-10-CM

## 2025-07-02 DIAGNOSIS — M47.812 CERVICAL SPONDYLOSIS WITHOUT MYELOPATHY: ICD-10-CM

## 2025-07-02 DIAGNOSIS — M17.0 PRIMARY OSTEOARTHRITIS OF BOTH KNEES: Primary | ICD-10-CM

## 2025-07-02 DIAGNOSIS — M43.16 SPONDYLOLISTHESIS OF LUMBAR REGION: ICD-10-CM

## 2025-07-02 PROCEDURE — 2500000004 HC RX 250 GENERAL PHARMACY W/ HCPCS (ALT 636 FOR OP/ED): Performed by: INTERNAL MEDICINE

## 2025-07-02 PROCEDURE — 20610 DRAIN/INJ JOINT/BURSA W/O US: CPT | Mod: LT | Performed by: INTERNAL MEDICINE

## 2025-07-02 PROCEDURE — 20610 DRAIN/INJ JOINT/BURSA W/O US: CPT | Mod: 50 | Performed by: INTERNAL MEDICINE

## 2025-07-02 PROCEDURE — 99214 OFFICE O/P EST MOD 30 MIN: CPT | Performed by: INTERNAL MEDICINE

## 2025-07-02 RX ORDER — AMITRIPTYLINE HYDROCHLORIDE 25 MG/1
25 TABLET, FILM COATED ORAL NIGHTLY PRN
COMMUNITY

## 2025-07-02 RX ORDER — TRIAMCINOLONE ACETONIDE 40 MG/ML
40 INJECTION, SUSPENSION INTRA-ARTICULAR; INTRAMUSCULAR
Status: COMPLETED | OUTPATIENT
Start: 2025-07-02 | End: 2025-07-02

## 2025-07-02 RX ORDER — QUETIAPINE FUMARATE 25 MG/1
25 TABLET, FILM COATED ORAL NIGHTLY
COMMUNITY

## 2025-07-02 RX ORDER — CETIRIZINE HYDROCHLORIDE 10 MG/1
10 TABLET ORAL DAILY
COMMUNITY
Start: 2025-05-10

## 2025-07-02 RX ORDER — DIAZEPAM 5 MG/1
5 TABLET ORAL EVERY 8 HOURS PRN
COMMUNITY

## 2025-07-02 RX ADMIN — TRIAMCINOLONE ACETONIDE 40 MG: 40 INJECTION, SUSPENSION INTRA-ARTICULAR; INTRAMUSCULAR at 17:08

## 2025-07-02 RX ADMIN — TRIAMCINOLONE ACETONIDE 40 MG: 40 INJECTION, SUSPENSION INTRA-ARTICULAR; INTRAMUSCULAR at 13:17

## 2025-07-02 ASSESSMENT — ENCOUNTER SYMPTOMS
TREMORS: 1
SHORTNESS OF BREATH: 0
NECK PAIN: 1
ABDOMINAL PAIN: 0
FATIGUE: 1

## 2025-07-02 ASSESSMENT — PAIN SCALES - GENERAL: PAINLEVEL_OUTOF10: 5

## 2025-07-02 NOTE — PROGRESS NOTES
"Subjective   Patient ID: Jes Roper \"Helen\" is a 70 y.o. female who presents for Neck Pain and Follow-up ( All over pain, but pain knees and legs worse ).  Neck Pain   Pertinent negatives include no chest pain.     Patient with history of fibromyalgia and multiple sclerosis with features of optic neuritis.  Also has a history of cardiomyopathy with chronic systolic heart failure, hypertension has been on various medications such as Cymbalta, Effexor, Lamictal, Lodine, naltrexone, gabapentin, Paxil, Pristiq, Prozac, Relafen, Savella, Skelaxin, Ultram, Wellbutrin, Zanaflex, Vioxx.    Patient was last seen in March And at that time we had injected her bilateral knees which unfortunately causes her chronic issues.  She had not seen a big difference increasing her Lyrica from 150 to 200 mg.    She does feel that the injections to the knees did help.  She always has a new issue.  She has had pains in the inside, the outside, she feels that \"the bone is going to come out of knees.\"  She gets pain in her shoulders.  She feels that there is a scalpel that is going to come out on the right which is a reversed replacement.  The left shoulder will also bother her as well. Has some pain in her left shoulder that will hurt when she is reaching for something.  She has fallen several times.  Fell coming in from the garage.  Has had near falls.  She is off balanced.     She had been accepted to passport and she is on medicaid.  She does have people coming into the house.  They did come for PT and had 4 in-home visits.    It will be a year since her  passed away and 2 years since her father.  Review of Systems   Constitutional:  Positive for fatigue.   Respiratory:  Negative for shortness of breath.    Cardiovascular:  Negative for chest pain.   Gastrointestinal:  Negative for abdominal pain.   Musculoskeletal:  Positive for neck pain.        Per HPI   Neurological:  Positive for tremors.        History of MS "       Objective   Physical Exam  GEN: NAD A&O x3 appropriate affect, rollator  EYES: no conjunctival redness, eyelids normal  SKIN: No rashes seen on exposed skin  TENDER POINTS: 14/18   MSK:  tender b/l knees  Right shoulder replacement no evidence of synovitis in the joints of her hands wrist elbows  Decreased range of motion of the left shoulder with pain on range of motion  Assessment/Plan     Fibromyalgia and osteoarthritis. She has a mild elevation of her CRP of uncertain clinical significance. Since being on the Plaquenil she has had some improvement of her symptoms.   -Currently taking 150 mg of Lyrica twice a day and did not make a huge difference from her taking 200 mg daily.  She has been taking all of her medications that her daughter has been putting into her pillbox.    -Also currently on hydroxychloroquine  Will put in PT for her again    Bilateral knee pain injected medially with 40 mg of Kenalog and also injected her left shoulder laterally    She is up-to-date with her urine drug screen and controlled substance contract for her pregabalin.  Checked [place].  She is also on modafinil and diazepam.    Will have her come back in 3 to 4 months or as needed     [place]:  No data recorded  I have personally reviewed the [place] report for Jes Roper. I have considered the risks of abuse, dependence, addiction and diversion    Is the patient prescribed a combination of a benzodiazepine and opioid?    Last Urine Drug Screen / ordered today:   No results found for this or any previous visit (from the past 8760 hours).    Results are as expected.         Controlled Substance Agreement:  Date of the Last Agreement: 03/13/24  Reviewed Controlled Substance Agreement including but not limited to the benefits, risks, and alternatives to treatment with a Controlled Substance medication(s).    Lyrica:  What is the patient's goal of therapy?  Help with fibromyalgia pain  Is this being achieved with current  "treatment?  Yes    Pain Assessment:  No data recorded    Activities of Daily Living:  Is your overall impression that this patient is benefiting (symptom reduction outweighs side effects) from Lyrica therapy? Yes     1. Physical Functioning: Better  2. Family Relationship: Better  3. Social Relationship: Better  4. Mood: Better  5. Sleep Patterns: Better  6. Overall Function: Better    Patient ID: Jes Roper \"Jeff" is a 70 y.o. female.    Large Joint Injection/Arthrocentesis: bilateral knee on 7/2/2025 1:17 PM  Indications: pain  Details: 25 G needle, medial approach  Medications (Right): 40 mg triamcinolone acetonide 40 mg/mL  Medications (Left): 40 mg triamcinolone acetonide 40 mg/mL  Outcome: tolerated well, no immediate complications  Procedure, treatment alternatives, risks and benefits explained, specific risks discussed. Patient was prepped and draped in the usual sterile fashion.       Large Joint Injection/Arthrocentesis: L subacromial bursa on 7/2/2025 5:08 PM  Indications: pain  Details: 25 G needle, lateral approach  Medications: 40 mg triamcinolone acetonide 40 mg/mL  Outcome: tolerated well, no immediate complications  Patient was prepped and draped in the usual sterile fashion.         Marilyn Hennessy MD 07/02/25 1:17 PM   "

## 2025-07-03 NOTE — PROGRESS NOTES
"AUDIOLOGY ADULT AUDIOMETRIC EVALUATION     Name: Jes Roper \"Helen\"   : 1955 Age: 70 y.o.   Date of Evaluation: 2025 Time: [id number]     IMPRESSIONS   Hearing sensitivity within normal limits sloping to mild sensorineural hearing loss in the right ear, and essentially mild sensorineural hearing loss sloping to moderate in the left ear. No previous results available.  Word recognition in quiet and in noise is excellent in both ears. Word recognition in noise is consistent with a mild SNR loss in the right ear, and a mild SNR loss in the left ear. With a mild SNR loss (3-7 dB), patient may hear almost as well as those with normal hearing are able to hear in noise.  Tympanometry indicates normal middle ear pressure and tympanic membrane mobility in both ears.   Amplification needs: Consider amplification if hearing loss begins affecting communication.    Today's test results are hearing loss requiring medical/otologic and audiologic follow-up.     RECOMMENDATIONS   Continue medical follow up with primary care provider and/or Ears Nose and Throat (ENT) provider as recommended.  Schedule an evaluation with an Ears Nose and Throat (ENT) provider to address ear popping. For ENT scheduling, call [phone].   Return for audiologic evaluation in conjunction with medical management or annually (whichever is sooner) to monitor hearing sensitivity and assess middle ear status or sooner should concerns arise. The audiology department can be reached at [phone] to schedule an appointment.   Avoid exposure to loud sounds by moving away from the noise, turning down the volume, or wearing proper hearing protection correctly.  Consider use of good communication strategies. These include but are not limited to the following: get Jes's attention before speaking to her, close the distance between Jes and who is speaking, limit background noise, allow Jes access to visual cues (i.e. facial " "expressions/mouth movements, pictures, written instructions, etc.). When in situations where background noise cannot be avoided, position yourself so that the background noise is to your back, and you communication partner is seated in front of you, ideally with a quiet area or wall behind them.     HISTORY    History obtained from patient report and chart review; please see medical records for complete history. Ms. Roper was seen today for an initial audiologic evaluation at the request of Ambrosio Gallegos MD.  Reason for visit: Sounds like bubbles popping and can't tell which ear but it seems like both of them.   Change in Hearing: denied; feels like she hears fine but says that others disagree with that.   Difficult listening environments: Television programs where people are not speak as loudly as they should   Tinnitus: denied  Otalgia: denied  Aural Pressure/Fullness: denied. Has been in the past but not recently.   Recent Ear Infections/Otorrhea: denied  Dizziness: History of dizziness for which she received physical therapy. Reports vertigo about four years ago and resolved her symptoms.   History of Ear Surgeries: denied  History of Noise Exposure: denied  Hearing Aid Use: None  Family History of Hearing Loss: denied  Falls within the last year: yes, more than two. Uses a can and Rolator.  Other Significant History:  Multiple sclerosis, fibromyalgia, weak heart muscle, high blood pressure.     EVALUATION AND PATIENT EDUCATION   The following is a brief interpretation of the obtained findings from the audiologic evaluation. Discussed results and recommendations with Ms. Roper. Questions were addressed and the patient was encouraged to contact our department at [phone] should concerns arise.     TEST RESULTS - See scanned audiogram in \"Media.\"   Otoscopic Evaluation:   Right Ear: Ear canal clear, tympanic membrane visualized.   Left Ear: Ear canal clear, tympanic membrane " visualized.       Tympanometry (226 Hz): An objective evaluation of middle ear function. CPT code: 00555   Right Ear: Type A, middle ear pressure and tympanic membrane compliance within normal limits.   Left Ear: Type A, middle ear pressure and tympanic membrane compliance within normal limits.       Acoustic Reflexes: An objective measure of auditory and facial nerve pathways.   (Probe) Right Ear (ipsi right stimulus ear; contralateral left stimulus ear):   (Ipsilateral) Responses present at expected levels for 500-2000 Hz.   (Probe) Left Ear (ipsi left stimulus ear; contralateral right stimulus ear):   (Ipsilateral) Responses present at expected levels for 500-2000 Hz.       Distortion Product Otoacoustic Emissions (DPOAE): An objective measurement of responses generated by the cochlea when simultaneously stimulated by two pure tone frequencies. CPT code: 99593   Right Ear: Did not test.   Left Ear: Did not test.   Present OAEs suggest normal or near cochlear outer hair cell function for corresponding frequency region(s). Absent OAEs with normal middle ear function can be consistent with some degree of hearing loss. Assessment of cochlear outer hair cell function may be impacted by outer or middle ear function.       Test technique: Conventional Audiometry via insert earphones.   An evaluation of hearing sensitivity via air and bone conduction and speech recognition. CPT code: 50144   Reliability: good       Pure Tone Audiometry:    Right Ear: Hearing sensitivity within normal limits for 125-250 Hz, sloping to mild  sensorineural hearing loss for 500-1000, rising to within normal limits through 6000 Hz, sloping to mild at 8000 Hz.   Left Ear: Mild sensorineural hearing loss for 125-1000 Hz, rising to within normal limits through 4000 Hz sloping to moderate through 8000 Hz.        Speech Audiometry:    Right Ear: Speech Reception Threshold (SRT) was obtained at 20 dB HL. This is in good agreement with three  frequency Pure Tone Average (PTA).  Word Recognition scores in quiet were excellent (100%) when words were presented at 60 dB HL. These results are based on [facility] Auditory Test No.6 (NU-6) Ordered by difficulty (N=10).   Left Ear: Speech Reception Threshold (SRT) was obtained at 20 dB HL. This is in good agreement with three frequency Pure Tone Average (PTA).  Word Recognition scores in quiet were excellent (100%) when words were presented at 60 dB HL. These results are based on [facility] Auditory Test No.6 (NU-6) Ordered by difficulty (N=10).   Speech in Noise (SIN):    Quick-SIN was presented at 70 dB HL and indicated the following:   Right Ear: 3.5,  mild SNR loss (3-7 dB)   Left Ear: 3.5,  mild SNR loss (3-7 dB)   Binaural: 1.5,  normal/near normal (0-3 dB)   SNR Loss Degree of SNR Loss Expected Improvement with Directional Microphone   0-3 dB Normal/Near normal May hear better than those with normal hearing are able to in noise.   3-7 dB Mild SNR loss  May hear almost as well as those with normal hearing are able to hear in noise.   7-15 dB Moderate SNR loss Directional microphones help; consider array microphone.   >15 dB Severe SNR Maximum SNR improvement is needed; consider an FM system.              ANN MARIE Zelaya, CCC-A  Licensed Clinical Audiologist    Degree of Hearing Decibel Range  Key   Within Normal Limits 0-20  CNT Could Not Test   Slight 21-25  DNT Did Not Test   Mild 26-40  ECV Ear Canal Volume   Moderate 41-55  OAE Otoacoustic Emissions   Moderately-Severe 56-70  SIN Speech in Noise   Severe 71-90  TM Tympanic Membrane   Profound 91+  HA Hearing Aid   Sensorineural Hearing Loss SNHL  MLV Monitored Live Voice   Conductive Hearing Loss CHL  WNL Within Normal Limits   Noise-Induced Hearing Loss NIHL

## 2025-07-07 ENCOUNTER — CLINICAL SUPPORT (OUTPATIENT)
Dept: AUDIOLOGY | Facility: CLINIC | Age: 70
End: 2025-07-07
Payer: COMMERCIAL

## 2025-07-07 DIAGNOSIS — H90.3 SENSORINEURAL HEARING LOSS (SNHL) OF BOTH EARS: ICD-10-CM

## 2025-07-07 PROCEDURE — 92567 TYMPANOMETRY: CPT

## 2025-07-07 PROCEDURE — 92557 COMPREHENSIVE HEARING TEST: CPT

## 2025-07-07 ASSESSMENT — PAIN - FUNCTIONAL ASSESSMENT: PAIN_FUNCTIONAL_ASSESSMENT: 0-10

## 2025-07-07 ASSESSMENT — PAIN SCALES - GENERAL: PAINLEVEL_OUTOF10: 0 - NO PAIN

## 2025-07-07 NOTE — PATIENT INSTRUCTIONS
Tips for Effective Communication  Effective communication with individuals with hearing loss requires patience, understanding, and the use of strategies that make conversations easier to follow. Below are helpful communication strategies for both the person with hearing loss and the person interacting with them:    For the Person with Hearing Loss  Request Clear Communication  Politely ask people to face you when speaking so you can lip-read or utilize facial cues if needed.  Ask them to speak more slowly and clearly (but not unnaturally slow) if necessary.  Use Assistive Devices  Hearing Aids: Ensure hearing aids are working well and clean. Make adjustments if needed.  TextÃ¡do Systems or Bluetooth: These can connect directly to hearing aids or cochlear implants and improve sound clarity by increasing the signal to noise ratio.   Captioning/Transcription Apps: Use apps like Live Transcribe, NagCELtrak, or Otter for real-time captions during conversations. For iPhone users, consider enabling Live Captions accessibility feature.   Amplifiers: Amplification devices like Pocket Talkers are simple personal sound amplifiers designed to help people hear better in one-on-one conversations or small group settings. They work by picking up sound through a microphone, amplifying it, and delivering it directly to the user’s ears through headphones or earbuds.  Set Up a Good Communication Environment  Sit in quiet areas or places with minimal background noise. Position yourself with background noise to your back, and facing what you are listening to.   Make sure the lighting is good so you can see facial expressions clearly, especially if you're lip-reading.  Avoid areas with distracting sounds like TV or music in the background. Turn off unnecessary extraneous noise.   Use Visual Cues  Pay attention to gestures, facial expressions, and body language.  Ask for Clarification  If you don’t understand something, don’t hesitate to ask for  clarification or for the person to repeat themselves.  Politely ask them to rephrase the sentence if you're still having difficulty understanding.  If you are unsure if you heard correctly, repeat back what you hear to ensure you understood.   Be Open About Your Needs  Don’t be afraid to explain your hearing needs to others in social or professional situations so they can better accommodate you.  Take Breaks  If you're in a long or complex conversation, take short breaks if you start to feel overwhelmed, especially in group settings.    For the Person Communicating with Someone with Hearing Loss  Get Their Attention First  Before speaking, make sure the person with hearing loss is paying attention. Gently tap their shoulder or wave to get their attention.  Avoid starting a conversation from another room or while they are distracted.  Face the Person and Make Eye Contact  Face the person directly and maintain eye contact. This helps them with lip-reading and interpreting facial expressions.  Keep your face visible--avoid covering your mouth, chewing, or talking while turning away.  Speak Clearly, Not Loudly  Speak in a clear, normal voice (don’t shout, as it can distort the sound and make it harder to understand).  Enunciate words well and avoid speaking too quickly. Allow for natural pauses in conversations to allow for processing of what was said.   Avoid Background Noise  Reduce any background noise or distractions when having a conversation. This may involve moving to a quieter space or turning off TV or music.  Rephrase Instead of Repeating  If the person didn’t understand something, try rephrasing it rather than repeating the same words. A different way of saying something may be easier to follow.  Use Written Communication or Texting  If verbal communication isn’t working, offer to write things down or use texting to continue the conversation.  For quick exchanges, try writing down your message on a piece of  paper or using a digital note-taking leanne.  Encourage use of technology. If appropriate, suggest using gmrztq-ur-ctfz apps (like [employer], [employer] Live Transcribe) or video calls with captions enabled.  Use Visual Aids and Gestures  Use gestures, facial expressions, or even drawings if necessary to help get your point across.  Pointing to relevant objects or locations can make it clearer.   Be Patient and Supportive  Understand that it may take longer for the person to process information. Be patient and don’t rush the conversation.  Offer encouragement and avoid showing frustration if communication isn’t flowing smoothly.  Provide Context for Conversations  If the conversation topic changes, give some context or background information to help the person follow along.   Be Clear About Important Information  If you’re conveying important information, like instructions or directions, try to break it down into smaller chunks or ask if they need clarification.    General Tips for Both Parties  Know Your Communication Style  Understand what methods (lip-reading, sign language, writing, etc.) work best for the person with hearing loss and adapt accordingly.  Don’t Assume  Never assume that someone has understood everything. Periodically check in to ensure the conversation is on track.  Use Technology to Your Advantage  Video calls with captioning, apps that provide real-time transcription, and hearing assistive technologies can make communication smoother.  Avoid Speaking from Another Room or Behind Closed Doors  Always approach the person or make sure they are facing you. Speaking from another room can make it much harder to understand the conversation.  Be Aware of Fatigue  Listening and lip-reading can be mentally exhausting. If the conversation goes on for a long time, it’s okay to offer breaks or finish the discussion later.

## 2025-07-07 NOTE — Clinical Note
Thank you for referring this patient for an audiologic evaluation. They were seen today for a hearing test. Please see attached encounter note and scanned audiogram for impressions and recommendations.    Consider ENT referral due to ear popping.

## 2025-07-11 ENCOUNTER — TELEPHONE (OUTPATIENT)
Dept: UROLOGY | Facility: CLINIC | Age: 70
End: 2025-07-11
Payer: COMMERCIAL

## 2025-07-14 DIAGNOSIS — I50.22 CHRONIC SYSTOLIC HEART FAILURE: ICD-10-CM

## 2025-07-14 RX ORDER — DAPAGLIFLOZIN 10 MG/1
10 TABLET, FILM COATED ORAL DAILY
Qty: 90 TABLET | Refills: 3 | Status: SHIPPED | OUTPATIENT
Start: 2025-07-14 | End: 2026-07-14

## 2025-08-01 ENCOUNTER — APPOINTMENT (OUTPATIENT)
Dept: PRIMARY CARE | Facility: CLINIC | Age: 70
End: 2025-08-01
Payer: COMMERCIAL

## 2025-08-02 DIAGNOSIS — G89.29 OTHER CHRONIC PAIN: ICD-10-CM

## 2025-08-02 DIAGNOSIS — F43.20 ADJUSTMENT DISORDER, UNSPECIFIED: ICD-10-CM

## 2025-08-02 DIAGNOSIS — R20.2 PARESTHESIA OF BOTH LOWER EXTREMITIES: ICD-10-CM

## 2025-08-02 DIAGNOSIS — K21.9 GASTROESOPHAGEAL REFLUX DISEASE, UNSPECIFIED WHETHER ESOPHAGITIS PRESENT: ICD-10-CM

## 2025-08-04 RX ORDER — OMEPRAZOLE 40 MG/1
40 CAPSULE, DELAYED RELEASE ORAL
Qty: 90 CAPSULE | Refills: 0 | Status: SHIPPED | OUTPATIENT
Start: 2025-08-04

## 2025-08-04 RX ORDER — TIZANIDINE 2 MG/1
TABLET ORAL
Qty: 180 TABLET | Refills: 0 | Status: SHIPPED | OUTPATIENT
Start: 2025-08-04

## 2025-08-07 ENCOUNTER — APPOINTMENT (OUTPATIENT)
Dept: PRIMARY CARE | Facility: CLINIC | Age: 70
End: 2025-08-07
Payer: COMMERCIAL

## 2025-08-07 VITALS — DIASTOLIC BLOOD PRESSURE: 84 MMHG | BODY MASS INDEX: 35.35 KG/M2 | WEIGHT: 181 LBS | SYSTOLIC BLOOD PRESSURE: 128 MMHG

## 2025-08-07 DIAGNOSIS — R82.90 ABNORMAL URINE FINDINGS: ICD-10-CM

## 2025-08-07 DIAGNOSIS — J42 CHRONIC BRONCHITIS, UNSPECIFIED CHRONIC BRONCHITIS TYPE (MULTI): ICD-10-CM

## 2025-08-07 DIAGNOSIS — G35 MULTIPLE SCLEROSIS (MULTI): ICD-10-CM

## 2025-08-07 DIAGNOSIS — R39.9 URINARY SYMPTOM OR SIGN: ICD-10-CM

## 2025-08-07 DIAGNOSIS — M06.9 RHEUMATOID ARTHRITIS, INVOLVING UNSPECIFIED SITE, UNSPECIFIED WHETHER RHEUMATOID FACTOR PRESENT (MULTI): ICD-10-CM

## 2025-08-07 DIAGNOSIS — R32 URINARY INCONTINENCE, UNSPECIFIED TYPE: ICD-10-CM

## 2025-08-07 DIAGNOSIS — R35.0 URINARY FREQUENCY: ICD-10-CM

## 2025-08-07 DIAGNOSIS — R20.9 COLD EXTREMITIES: ICD-10-CM

## 2025-08-07 DIAGNOSIS — E66.01 OBESITY, MORBID (MULTI): ICD-10-CM

## 2025-08-07 DIAGNOSIS — D56.1 BETA THALASSEMIA (MULTI): Primary | ICD-10-CM

## 2025-08-07 PROCEDURE — 3074F SYST BP LT 130 MM HG: CPT | Performed by: INTERNAL MEDICINE

## 2025-08-07 PROCEDURE — 1159F MED LIST DOCD IN RCRD: CPT | Performed by: INTERNAL MEDICINE

## 2025-08-07 PROCEDURE — 3079F DIAST BP 80-89 MM HG: CPT | Performed by: INTERNAL MEDICINE

## 2025-08-07 PROCEDURE — 99214 OFFICE O/P EST MOD 30 MIN: CPT | Performed by: INTERNAL MEDICINE

## 2025-08-07 PROCEDURE — 4010F ACE/ARB THERAPY RXD/TAKEN: CPT | Performed by: INTERNAL MEDICINE

## 2025-08-07 PROCEDURE — 1036F TOBACCO NON-USER: CPT | Performed by: INTERNAL MEDICINE

## 2025-08-07 RX ORDER — BUPROPION HYDROCHLORIDE 150 MG/1
TABLET ORAL
COMMUNITY
Start: 2025-08-05

## 2025-08-07 RX ORDER — OXYBUTYNIN CHLORIDE 5 MG/1
5 TABLET, EXTENDED RELEASE ORAL DAILY
Qty: 30 TABLET | Refills: 11 | Status: SHIPPED | OUTPATIENT
Start: 2025-08-07 | End: 2026-08-07

## 2025-08-07 NOTE — PATIENT INSTRUCTIONS
The medications that dry you out, you  Can try just taking the oxybutinin when you are going out , will also see how the long acting form will help  The amitriptyline (Elavil) , quetiapine and bupropion , Dr Headley should be able to help us see if there are alternative medications that are not as drying     Please call our  [phone] to assist with scheduling   Vascular     Try wearing loose socks at night   Try the heated blanket check if it has a timer to stop it automatically

## 2025-08-07 NOTE — PROGRESS NOTES
"Subjective   Patient ID: Jes Roper \"Helen\" is a 70 y.o. female who presents for Follow-up.    HPI  Patient in for a visit  Her sister asked her to [place] (lives in [place])     Review of Systems  General: Denies fever, chills, night sweats,  Eyes: Negative for recent visual changes  Ears, Nose, Throat :  Negative for hearing changes, sinus discomfort  Dermatologic: Negative for new skin conditions, rash  Respiratory: Negative for wheezing, shortness of breath, cough  Cardiovascular: Negative for chest pain, palpitations, or leg swelling  Gastrointestinal: Negative for nausea/vomiting, abdominal pain, changes in bowel habits  Genitourinary Negative for Urinary Incontinence  urgency , frequency, discomfort   Musculoskeletal: see hpi  Neurological: Negative for headaches, dizziness    Previous history  Medical History[1]  Surgical History[2]  Social History[3]  Family History[4]  Allergies[5]  Current Outpatient Medications   Medication Instructions    amitriptyline (ELAVIL) 25 mg, Nightly PRN    budesonide (PULMICORT) 0.25 mg, nebulization, 2 times daily RT, Rinse mouth with water after use to reduce aftertaste and incidence of candidiasis. Do not swallow.    buPROPion XL (Wellbutrin XL) 150 mg 24 hr tablet     cetirizine (ZYRTEC) 10 mg, Daily    cholecalciferol (VITAMIN D-3) 2,000 Units, Daily RT    cyanocobalamin (VITAMIN B-12) 100 mcg, Daily    dapagliflozin propanediol (FARXIGA) 10 mg, oral, Daily    diazePAM (VALIUM) 5 mg, Every 8 hours PRN    DULoxetine (CYMBALTA) 60 mg, Daily RT    fluticasone (Flonase) 50 mcg/actuation nasal spray 2 sprays, Daily RT    hyoscyamine ER (Levbid) 0.375 mg 12 hr tablet 1 tablet, Daily    levothyroxine (SYNTHROID, LEVOXYL) 50 mcg, oral, Daily, as directed    losartan (COZAAR) 25 mg, oral, Daily RT    metoprolol succinate XL (TOPROL-XL) 200 mg, oral, Daily    modafinil (PROVIGIL) 200 mg, 2 times daily    multivit-minerals/folic acid (CENTRUM ADULTS ORAL) 1 tablet, Daily    " "omeprazole (PRILOSEC) 40 mg, oral, Daily before breakfast    oxyBUTYnin (DITROPAN) 5 mg, oral, 2 times daily    pregabalin (LYRICA) 150 mg, oral, 2 times daily    QUEtiapine (SEROQUEL) 25 mg, Nightly    rosuvastatin (CRESTOR) 5 mg, oral, Daily    spironolactone (ALDACTONE) 25 mg, oral, Daily    tiZANidine (Zanaflex) 2 mg tablet TAKE 2 TABLETS BY MOUTH EVERY 8 HOURS AS NEEDED FOR MUSCLE SPASMS    torsemide (DEMADEX) 20 mg, oral, Daily    trazodone HCl (RALDESY ORAL)        Objective       Physical Exam  Vital Signs: as recorded above  General: Well groomed, well nourished   Orientation:  Alert , oriented to time, place , and person   Mood and Affect:  Cooperative , no apparent distress normal affect  Skin: Good color, good turgor  Eyes: Extra ocular muscle movements intact, anicteric sclerae  Neck: Supple, full range of movement  Chest: Normal breath sounds, normal chest wall exam, symmetric, good air entry, clear to auscultation  Heart: Regular rate and rhythm, without murmur, gallop, or rubs  Abdomen soft nontender no masses felt no hepatosplenomegaly, no rebound or guarding  BACK:  no CTLS spine tenderness, no flank tenderness  Extremities: full range of movement  bilateral UE and bilateral LE,  no lower extremity edema  Neurological: Alert, oriented, cranial nerves II-XII grossly intact except for visual acuity  Sensation:  Intact   Gait: normal steady      Assessment/Plan   Jes YEUNG Judson \"Helen\" is a 70 y.o. female who presents for the concerns below:    Problem List Items Addressed This Visit    None    Beta thalassemia stable lst cbc11/2024 normal    Gait and tremor, voice tremulous  she will see the doctor [facility] for her MS so will have her sxs reassessed   The medications that dry you out, you  Can try just taking the oxybutinin when you are going out , will also see how the long acting form will help  The amitriptyline (Elavil) , quetiapine and bupropion , Dr Headley should be able to " help us see if there are alternative medications that are not as drying     Hearing audiology was okay      Urinary incontinence is still ongoing she had stopped the water pill while at [place] , oxybutinin can go to ER formulation  and she will see urology Dr Murphy , wearing the depends     RHEUMATOID arthritis Ongoing may also be contributing to her gait instability     She did get a hospital bed to help her get in and out of bed   Can raise her leg area , cold legs , has the ac on for her dtr's hot flashes     DNR form , they were told to put it on the bottom left corner of the refrigerator (had this with her mother)    Poor temp control of her legs , will put in consult to vascular     She does have a counselor from [place] and sees Dr Headley     Discussed with:   Return in :  4 months   Portions of this note were generated using digital voice recognition software, and may contain grammatical errors       Ambrosio Gallegos MD  08/07/25  1:14 PM         [1]   Past Medical History:  Diagnosis Date    Abnormal findings on diagnostic imaging of other specified body structures 12/30/2022    Abnormal chest CT    Anemia, unspecified 12/17/2021    Anemia    Anxiety disorder, unspecified 09/26/2013    Anxiety    Cervicalgia 12/06/2019    Neck pain    Chronic cough 09/22/2022    Persistent cough    Disorder of kidney and ureter, unspecified 12/30/2022    Abnormal renal function    Disorder of lipoprotein metabolism, 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    Abnormal serum cholesterol    Disorder of pigmentation, unspecified 03/22/2022    Discoloration of skin of toe    Dizziness and giddiness 07/01/2022    Dizziness    Dorsalgia, unspecified 12/30/2022    Back pain    Edema, unspecified 07/17/2019    Edema    Encounter for fitting and adjustment of other specified devices 01/19/2021    Fitting and adjustment of pessary    Encounter for general adult medical examination without abnormal findings 03/22/2022    Encounter for  Medicare annual wellness exam    Encounter for gynecological examination (general) (routine) without abnormal findings 12/05/2018    Visit for gynecologic examination    Encounter for gynecological examination (general) (routine) without abnormal findings 03/17/2016    Encounter for gynecological examination without abnormal finding    Encounter for other screening for malignant neoplasm of breast 11/01/2019    Encounter for screening for malignant neoplasm of breast    Encounter for other screening for malignant neoplasm of breast 03/22/2022    Breast screening    Fibromyalgia 09/29/2022    Fibromyalgia    Hyperlipidemia     Hypertension     Hypothyroidism, unspecified 03/22/2022    Hypothyroidism    Local infection of the skin and subcutaneous tissue, unspecified 02/22/2019    Toe infection    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[facility])     Multiple sclerosis (Multi) 12/06/2022    Multiple sclerosis    Obesity, unspecified 11/21/2016    Mild obesity    Other age-related incipient cataract, bilateral 09/22/2022    Other age-related incipient cataract of both eyes    Other chronic pain 07/01/2022    Chronic pain    Other specified disorders of urethra 09/08/2020    Prolapse of urethra    Pain in right knee 06/22/2022    Right knee pain    Peripheral vascular disease, unspecified 04/07/2022    Arterial insufficiency of lower extremity    Personal history of other diseases of urinary system 05/06/2020    History of prolapse of bladder    Personal history of other endocrine, nutritional and metabolic disease 06/11/2014    History of hypothyroidism    Personal history of other specified conditions 12/30/2022    History of chronic cough    Sciatica, right side 08/06/2019    Right sciatic nerve pain    Sleep apnea, unspecified 04/10/2017    Sleep apnea    Torticollis 03/22/2022    Torticollis    Tremor, unspecified 09/22/2022    Tremor of both hands    Unspecified cataract     Cataracts, both eyes    Unspecified skin changes  2022    Skin change    Unspecified symptoms and signs involving the genitourinary system 2021    Urinary symptom or sign   [2]   Past Surgical History:  Procedure Laterality Date     SECTION, CLASSIC  10/03/2018     Section    GALLBLADDER SURGERY  2020    Gallbladder Surgery    OTHER SURGICAL HISTORY  2021    Shoulder replacement    OTHER SURGICAL HISTORY  2020    Exploratory laparotomy    OTHER SURGICAL HISTORY  2020    Urethroplasty   [3]   Social History  Tobacco Use    Smoking status: Never     Passive exposure: Never    Smokeless tobacco: Never   Vaping Use    Vaping status: Never Used   Substance Use Topics    Alcohol use: Never    Drug use: Never   [4]   Family History  Problem Relation Name Age of Onset    Diabetes Mother      Hypertension Mother      Skin cancer Mother      Other (HEPATIC CIRRHOSIS [Other]) Father      No Known Problems Sister      Other (AMD) Brother      Retinal detachment Daughter Adilia     Retinal detachment Son Aureliano     Cervical cancer Mother's Sister      Cervical cancer Maternal Grandmother     [5]   Allergies  Allergen Reactions    Naltrexone Unknown    Penicillin Unknown

## 2025-08-27 ENCOUNTER — APPOINTMENT (OUTPATIENT)
Dept: UROLOGY | Facility: CLINIC | Age: 70
End: 2025-08-27
Payer: COMMERCIAL

## 2025-08-27 DIAGNOSIS — R32 URINARY INCONTINENCE, UNSPECIFIED TYPE: ICD-10-CM

## 2025-08-27 PROCEDURE — 51728 CYSTOMETROGRAM W/VP: CPT | Performed by: NURSE PRACTITIONER

## 2025-08-27 PROCEDURE — 51784 ANAL/URINARY MUSCLE STUDY: CPT | Performed by: NURSE PRACTITIONER

## 2025-08-27 PROCEDURE — 51741 ELECTRO-UROFLOWMETRY FIRST: CPT | Performed by: NURSE PRACTITIONER

## 2025-08-27 PROCEDURE — 51797 INTRAABDOMINAL PRESSURE TEST: CPT | Performed by: NURSE PRACTITIONER

## 2025-08-28 ENCOUNTER — OFFICE VISIT (OUTPATIENT)
Dept: CARDIOLOGY | Facility: CLINIC | Age: 70
End: 2025-08-28
Payer: COMMERCIAL

## 2025-08-28 VITALS
HEIGHT: 60 IN | SYSTOLIC BLOOD PRESSURE: 97 MMHG | HEART RATE: 70 BPM | OXYGEN SATURATION: 95 % | BODY MASS INDEX: 35.53 KG/M2 | WEIGHT: 181 LBS | DIASTOLIC BLOOD PRESSURE: 62 MMHG

## 2025-08-28 DIAGNOSIS — I50.22 CHRONIC SYSTOLIC HEART FAILURE: Primary | ICD-10-CM

## 2025-08-28 DIAGNOSIS — I10 ESSENTIAL HYPERTENSION, BENIGN: ICD-10-CM

## 2025-08-28 DIAGNOSIS — E78.5 DYSLIPIDEMIA: ICD-10-CM

## 2025-08-28 DIAGNOSIS — R06.09 DYSPNEA ON EXERTION: ICD-10-CM

## 2025-08-28 PROCEDURE — 1125F AMNT PAIN NOTED PAIN PRSNT: CPT | Performed by: INTERNAL MEDICINE

## 2025-08-28 PROCEDURE — 4010F ACE/ARB THERAPY RXD/TAKEN: CPT | Performed by: INTERNAL MEDICINE

## 2025-08-28 PROCEDURE — 99212 OFFICE O/P EST SF 10 MIN: CPT

## 2025-08-28 PROCEDURE — 3008F BODY MASS INDEX DOCD: CPT | Performed by: INTERNAL MEDICINE

## 2025-08-28 PROCEDURE — 3074F SYST BP LT 130 MM HG: CPT | Performed by: INTERNAL MEDICINE

## 2025-08-28 PROCEDURE — 1036F TOBACCO NON-USER: CPT | Performed by: INTERNAL MEDICINE

## 2025-08-28 PROCEDURE — 1159F MED LIST DOCD IN RCRD: CPT | Performed by: INTERNAL MEDICINE

## 2025-08-28 PROCEDURE — G2211 COMPLEX E/M VISIT ADD ON: HCPCS | Performed by: INTERNAL MEDICINE

## 2025-08-28 PROCEDURE — 3078F DIAST BP <80 MM HG: CPT | Performed by: INTERNAL MEDICINE

## 2025-08-28 PROCEDURE — 1160F RVW MEDS BY RX/DR IN RCRD: CPT | Performed by: INTERNAL MEDICINE

## 2025-08-28 PROCEDURE — 99214 OFFICE O/P EST MOD 30 MIN: CPT | Performed by: INTERNAL MEDICINE

## 2025-08-28 RX ORDER — METOPROLOL SUCCINATE 200 MG/1
200 TABLET, EXTENDED RELEASE ORAL DAILY
Qty: 90 TABLET | Refills: 3 | Status: SHIPPED | OUTPATIENT
Start: 2025-08-28

## 2025-08-28 RX ORDER — SPIRONOLACTONE 25 MG/1
25 TABLET ORAL DAILY
Qty: 90 TABLET | Refills: 3 | Status: SHIPPED | OUTPATIENT
Start: 2025-08-28

## 2025-08-28 RX ORDER — POTASSIUM CHLORIDE 20 MEQ/1
TABLET, EXTENDED RELEASE ORAL
COMMUNITY

## 2025-08-28 ASSESSMENT — ENCOUNTER SYMPTOMS
LOSS OF SENSATION IN FEET: 0
DEPRESSION: 1
OCCASIONAL FEELINGS OF UNSTEADINESS: 1

## 2025-08-28 ASSESSMENT — PAIN SCALES - GENERAL: PAINLEVEL_OUTOF10: 2

## 2025-09-02 DIAGNOSIS — I50.22 CHRONIC SYSTOLIC HEART FAILURE: ICD-10-CM

## 2025-09-02 RX ORDER — TORSEMIDE 20 MG/1
20 TABLET ORAL DAILY
Qty: 90 TABLET | Refills: 3 | Status: SHIPPED | OUTPATIENT
Start: 2025-09-02

## 2025-09-04 DIAGNOSIS — M79.7 FIBROMYALGIA: ICD-10-CM

## 2025-09-04 RX ORDER — PREGABALIN 150 MG/1
150 CAPSULE ORAL 2 TIMES DAILY
Qty: 60 CAPSULE | Refills: 3 | Status: SHIPPED | OUTPATIENT
Start: 2025-09-04

## 2025-09-22 ENCOUNTER — APPOINTMENT (OUTPATIENT)
Dept: UROLOGY | Facility: CLINIC | Age: 70
End: 2025-09-22
Payer: COMMERCIAL

## 2025-09-23 ENCOUNTER — APPOINTMENT (OUTPATIENT)
Dept: UROLOGY | Facility: CLINIC | Age: 70
End: 2025-09-23
Payer: COMMERCIAL

## 2025-12-08 ENCOUNTER — APPOINTMENT (OUTPATIENT)
Dept: PRIMARY CARE | Facility: CLINIC | Age: 70
End: 2025-12-08
Payer: COMMERCIAL